# Patient Record
Sex: MALE | Race: WHITE | NOT HISPANIC OR LATINO | Employment: OTHER | URBAN - METROPOLITAN AREA
[De-identification: names, ages, dates, MRNs, and addresses within clinical notes are randomized per-mention and may not be internally consistent; named-entity substitution may affect disease eponyms.]

---

## 2017-01-09 DIAGNOSIS — I48.91 ATRIAL FIBRILLATION, UNSPECIFIED TYPE (HCC): ICD-10-CM

## 2017-01-09 DIAGNOSIS — I10 ESSENTIAL HYPERTENSION: ICD-10-CM

## 2017-01-09 RX ORDER — LOSARTAN POTASSIUM 50 MG/1
50 TABLET ORAL DAILY
Qty: 90 TAB | Refills: 3 | Status: SHIPPED | OUTPATIENT
Start: 2017-01-09 | End: 2018-02-26

## 2017-05-01 DIAGNOSIS — I48.20 CHRONIC ATRIAL FIBRILLATION (HCC): ICD-10-CM

## 2017-05-02 RX ORDER — DILTIAZEM HYDROCHLORIDE 360 MG/1
360 CAPSULE, EXTENDED RELEASE ORAL DAILY
Qty: 90 CAP | Refills: 2 | Status: SHIPPED | OUTPATIENT
Start: 2017-05-02 | End: 2017-05-05 | Stop reason: SDUPTHER

## 2017-05-05 DIAGNOSIS — I48.20 CHRONIC ATRIAL FIBRILLATION (HCC): ICD-10-CM

## 2017-05-05 RX ORDER — DILTIAZEM HYDROCHLORIDE 360 MG/1
360 CAPSULE, EXTENDED RELEASE ORAL DAILY
Qty: 90 CAP | Refills: 2 | Status: SHIPPED | OUTPATIENT
Start: 2017-05-05 | End: 2018-02-12 | Stop reason: SDUPTHER

## 2017-06-29 DIAGNOSIS — I10 ESSENTIAL HYPERTENSION: ICD-10-CM

## 2017-06-29 RX ORDER — SPIRONOLACTONE 25 MG/1
25 TABLET ORAL DAILY
Qty: 90 TAB | Refills: 3 | OUTPATIENT
Start: 2017-06-29 | End: 2018-10-12 | Stop reason: SDUPTHER

## 2018-02-12 DIAGNOSIS — I48.20 CHRONIC ATRIAL FIBRILLATION (HCC): ICD-10-CM

## 2018-02-13 RX ORDER — DILTIAZEM HYDROCHLORIDE 360 MG/1
CAPSULE, EXTENDED RELEASE ORAL
Qty: 60 CAP | Refills: 0 | Status: SHIPPED | OUTPATIENT
Start: 2018-02-13 | End: 2018-03-13 | Stop reason: SDUPTHER

## 2018-02-15 DIAGNOSIS — I48.20 CHRONIC ATRIAL FIBRILLATION (HCC): ICD-10-CM

## 2018-02-15 RX ORDER — DILTIAZEM HYDROCHLORIDE 360 MG/1
360 CAPSULE, EXTENDED RELEASE ORAL
Qty: 180 CAP | Refills: 0 | Status: CANCELLED | OUTPATIENT
Start: 2018-02-15

## 2018-02-15 NOTE — TELEPHONE ENCOUNTER
----- Message from Isaura Serrano sent at 2/15/2018 12:55 PM PST -----  Regarding: Patient wants call back about prescription  ROMIE/Nilesh    Patient was told by his pharmacy that he needs to speak to Dr Calderón about his prescription for Diltiazem. He wants a call back at [phone].    --------------------------------------------------------------------------    Pt is concerned that he wont get his diltiazem prescription due to a notification he got from [facility] that he needs to see if provider for more medication. Explained to the pt that a 60 day prescription was authorized with Dr. Calderón to [facility] but would need to be seen for further refill. Pt understands and will talk to scheduling after this call. He would like [facility] to verify.     Called [facility] and verified that prescription is on its way.

## 2018-02-23 ENCOUNTER — TELEPHONE (OUTPATIENT)
Dept: CARDIOLOGY | Facility: MEDICAL CENTER | Age: 68
End: 2018-02-23

## 2018-02-23 NOTE — TELEPHONE ENCOUNTER
calling to follow up STAT clearance request sent from Dr. Bao Zhong's office today.   Received: Today   Message Contents   Tj Shah R.N.   Phone Number: [phone]             ROMIE/Ansley     Pt calling to follow up on STAT urgent clearance request sent from Sonja at Dr. Bao Zhong's office today. Pt is scheduled for left knee surgery with Dr. Zhong Monday 2/26, at [facility]. He can be reached at [phone].      ____________________________________________________________      Stat request came in at 1430 and the office required it by 1500 per patient's message to .      Approved by the ADD (Dr. Lira). Patient to stop coumadin today for a total of 3 days of withholding.      Attempted to contact Sonja at Dr. Zhong's office, no answer.  Left message.     Form faxed to: [phone]    Attempted to contact patient, no answer.  Left message including stopping his coumadin starting tonight.      Completed form to scanning      __________________________________    Patient called back.  Patient actually stopped coumadin 2 days ago (for a total of 5 days)    Patient requesting I also send clearance to [facility]  Fax# [phone]  Hardy Sandoval

## 2018-02-26 ENCOUNTER — OFFICE VISIT (OUTPATIENT)
Dept: CARDIOLOGY | Facility: MEDICAL CENTER | Age: 68
End: 2018-02-26
Payer: MEDICARE

## 2018-02-26 VITALS
HEIGHT: 70 IN | OXYGEN SATURATION: 91 % | SYSTOLIC BLOOD PRESSURE: 134 MMHG | BODY MASS INDEX: 37.37 KG/M2 | DIASTOLIC BLOOD PRESSURE: 86 MMHG | HEART RATE: 68 BPM | WEIGHT: 261 LBS

## 2018-02-26 DIAGNOSIS — I48.19 PERSISTENT ATRIAL FIBRILLATION (HCC): ICD-10-CM

## 2018-02-26 DIAGNOSIS — I10 HTN (HYPERTENSION), MALIGNANT: ICD-10-CM

## 2018-02-26 DIAGNOSIS — Z79.899 HIGH RISK MEDICATION USE: ICD-10-CM

## 2018-02-26 PROCEDURE — 99215 OFFICE O/P EST HI 40 MIN: CPT | Performed by: INTERNAL MEDICINE

## 2018-02-26 RX ORDER — LOSARTAN POTASSIUM 100 MG/1
100 TABLET ORAL DAILY
Qty: 90 TAB | Refills: 3 | Status: SHIPPED | OUTPATIENT
Start: 2018-02-26 | End: 2019-07-16 | Stop reason: SDUPTHER

## 2018-02-26 ASSESSMENT — ENCOUNTER SYMPTOMS
VOMITING: 0
DEPRESSION: 0
NAUSEA: 0
PALPITATIONS: 0
WEIGHT LOSS: 0
MYALGIAS: 0
BLOOD IN STOOL: 0
BLURRED VISION: 0
HEADACHES: 0
COUGH: 0
CHILLS: 0
SHORTNESS OF BREATH: 0
DIZZINESS: 0
FALLS: 0
BRUISES/BLEEDS EASILY: 0
SPEECH CHANGE: 0
PND: 0
FEVER: 0
HALLUCINATIONS: 0
LOSS OF CONSCIOUSNESS: 0
ABDOMINAL PAIN: 0
CLAUDICATION: 0
SENSORY CHANGE: 0
DOUBLE VISION: 0
EYE PAIN: 0
EYE DISCHARGE: 0
ORTHOPNEA: 0

## 2018-02-26 NOTE — PROGRESS NOTES
Subjective:   Zander Stewart is a 68 y.o. male who presents today for cardiac care and evaluation due to uncontrolled hypertension. Patient was undergoing surgery for his knee. However, his blood pressure was noted to be very elevated at 190s systolic. His surgery was canceled. Patient also has a history of persistent atrial fibrillation for which he had 2 failed cardioversions in the past. He is on rate control strategy. He denies having any symptoms of chest pain shortness of breath at this point. He feels fine.    Chief Complaint: Malignant hypertension.    Past Medical History:   Diagnosis Date   • Atrial fibrillation (CMS-HCC)    • Atrial flutter (CMS-HCC)    • Chronic anticoagulation    • Polycystic kidney, unspecified type    • Pure hypercholesterolemia    • Sleep apnea     on oxygen, unable to tolerate CPAP.   • Unspecified essential hypertension    • Unspecified sleep apnea      Past Surgical History:   Procedure Laterality Date   • APPENDECTOMY     • OTHER      Cardiac Cath   • OTHER      Kidney Surgery   • OTHER      Nose Surgery   • OTHER      Tonsillectomy with Adenoidectomy     History reviewed. No pertinent family history.  History   Smoking Status   • Current Every Day Smoker   • Types: Cigars   Smokeless Tobacco   • Never Used     No Known Allergies  Outpatient Encounter Prescriptions as of 2/26/2018   Medication Sig Dispense Refill   • losartan (COZAAR) 100 MG Tab Take 1 Tab by mouth every day. 90 Tab 3   • diltiazem CD (CARDIZEM CD) 360 MG ER capsule TAKE 1 CAPSULE BY MOUTH  EVERY DAY 60 Cap 0   • spironolactone (ALDACTONE) 25 MG Tab Take 1 Tab by mouth every day. 90 Tab 3   • simvastatin (ZOCOR) 40 MG TABS Take 1 Tab by mouth every evening. 90 Tab 3   • furosemide (LASIX) 40 MG TABS Take 40 mg by mouth every day.     • omeprazole (PRILOSEC) 20 MG CPDR Take 20 mg by mouth 2 times a day.     • warfarin (COUMADIN) 5 MG TABS Take 5 mg by mouth every day. 5 mg 6 days a week and 2.5 mg one day    "  • buPROPion SR (WELLBUTRIN-SR) 150 MG TB12 Take 150 mg by mouth every 48 hours.     • allopurinol (ZYLOPRIM) 100 MG TABS Take 100 mg by mouth every day.     • zolpidem (AMBIEN) 10 MG TABS Take 10 mg by mouth at bedtime as needed.     • [DISCONTINUED] losartan (COZAAR) 50 MG Tab Take 1 Tab by mouth every day. 90 Tab 3     No facility-administered encounter medications on file as of 2/26/2018.      Review of Systems   Constitutional: Negative for chills, fever, malaise/fatigue and weight loss.   HENT: Negative for ear discharge, ear pain, hearing loss and nosebleeds.    Eyes: Negative for blurred vision, double vision, pain and discharge.   Respiratory: Negative for cough and shortness of breath.    Cardiovascular: Negative for chest pain, palpitations, orthopnea, claudication, leg swelling and PND.   Gastrointestinal: Negative for abdominal pain, blood in stool, melena, nausea and vomiting.   Genitourinary: Negative for dysuria and hematuria.   Musculoskeletal: Negative for falls, joint pain and myalgias.   Skin: Negative for itching and rash.   Neurological: Negative for dizziness, sensory change, speech change, loss of consciousness and headaches.   Endo/Heme/Allergies: Negative for environmental allergies. Does not bruise/bleed easily.   Psychiatric/Behavioral: Negative for depression, hallucinations and suicidal ideas.        Objective:   /86   Pulse 68   Ht 1.778 m (5' 10\")   Wt 118.4 kg (261 lb)   SpO2 91%   BMI 37.45 kg/m²     Physical Exam   Constitutional: He is oriented to person, place, and time. No distress.   HENT:   Head: Normocephalic and atraumatic.   Eyes: EOM are normal.   Neck: Normal range of motion. No JVD present.   Cardiovascular: Normal rate, regular rhythm, normal heart sounds and intact distal pulses.  Exam reveals no gallop and no friction rub.    No murmur heard.  Bilateral femoral pulses are 2+, bilateral dorsalis pedis pulses are 2+, bilateral posterior tibialis pulses are " 2+.   Pulmonary/Chest: No respiratory distress. He has no wheezes. He has no rales. He exhibits no tenderness.   Abdominal: Soft. Bowel sounds are normal. There is no tenderness. There is no rebound and no guarding.   The is no presence of abdominal bruits   Musculoskeletal: Normal range of motion.   Neurological: He is alert and oriented to person, place, and time.   Skin: Skin is warm and dry.   Psychiatric: He has a normal mood and affect.   Nursing note and vitals reviewed.      Assessment:     1. High risk medication use  BASIC METABOLIC PANEL   2. Persistent atrial fibrillation (CMS-HCC)  BASIC METABOLIC PANEL   3. HTN (hypertension), malignant  losartan (COZAAR) 100 MG Tab    BASIC METABOLIC PANEL       Medical Decision Making:  Today's Assessment / Status / Plan:   His blood pressure is better today but still elevated.  I will increase losartan to 100 mg once a day.  Continue Cardizem  mg by mouth once a day.  Continue anticoagulation for recurrent persistent atrial fibrillation.  Rate is controlled.    Patient will resume to see Dr. Perkins in 2 weeks for follow-up of his blood pressure response.

## 2018-02-26 NOTE — TELEPHONE ENCOUNTER
[facility] calling stating that Dr. Montez will not be proceeding with knee scope today as patients blood pressure was 200/135 on arrival. The patient was given 1 dose of labetalol and now has a BP of 175/120. Per Dr. Montez, patient either needs to be seen by cardiology today or will need to go to the ER.     Pt has not been seen at our office since 11/03/2016 with Dr. Calderón. He does have a history of labile hypertension. Pt scheduled with Dr. Curry today at 1330. Pt also has a f/u scheduled with Dr. Calderón 3/19.

## 2018-02-26 NOTE — LETTER
[facility]   [address]  [place] [address]  Phone: [phone]  Fax: [phone]              Zander Stewart  1950    Encounter Date: 2/26/2018    Angelica Curry M.D.          PROGRESS NOTE:  Subjective:   Zander Stewart is a 68 y.o. male who presents today for cardiac care and evaluation due to uncontrolled hypertension. Patient was undergoing surgery for his knee. However, his blood pressure was noted to be very elevated at 190s systolic. His surgery was canceled. Patient also has a history of persistent atrial fibrillation for which he had 2 failed cardioversions in the past. He is on rate control strategy. He denies having any symptoms of chest pain shortness of breath at this point. He feels fine.    Chief Complaint: Malignant hypertension.    Past Medical History:   Diagnosis Date   • Atrial fibrillation (CMS-HCC)    • Atrial flutter (CMS-HCC)    • Chronic anticoagulation    • Polycystic kidney, unspecified type    • Pure hypercholesterolemia    • Sleep apnea     on oxygen, unable to tolerate CPAP.   • Unspecified essential hypertension    • Unspecified sleep apnea      Past Surgical History:   Procedure Laterality Date   • APPENDECTOMY     • OTHER      Cardiac Cath   • OTHER      Kidney Surgery   • OTHER      Nose Surgery   • OTHER      Tonsillectomy with Adenoidectomy     History reviewed. No pertinent family history.  History   Smoking Status   • Current Every Day Smoker   • Types: Cigars   Smokeless Tobacco   • Never Used     No Known Allergies  Outpatient Encounter Prescriptions as of 2/26/2018   Medication Sig Dispense Refill   • losartan (COZAAR) 100 MG Tab Take 1 Tab by mouth every day. 90 Tab 3   • diltiazem CD (CARDIZEM CD) 360 MG ER capsule TAKE 1 CAPSULE BY MOUTH  EVERY DAY 60 Cap 0   • spironolactone (ALDACTONE) 25 MG Tab Take 1 Tab by mouth every day. 90 Tab 3   • simvastatin (ZOCOR) 40 MG TABS Take 1 Tab by  "mouth every evening. 90 Tab 3   • furosemide (LASIX) 40 MG TABS Take 40 mg by mouth every day.     • omeprazole (PRILOSEC) 20 MG CPDR Take 20 mg by mouth 2 times a day.     • warfarin (COUMADIN) 5 MG TABS Take 5 mg by mouth every day. 5 mg 6 days a week and 2.5 mg one day     • buPROPion SR (WELLBUTRIN-SR) 150 MG TB12 Take 150 mg by mouth every 48 hours.     • allopurinol (ZYLOPRIM) 100 MG TABS Take 100 mg by mouth every day.     • zolpidem (AMBIEN) 10 MG TABS Take 10 mg by mouth at bedtime as needed.     • [DISCONTINUED] losartan (COZAAR) 50 MG Tab Take 1 Tab by mouth every day. 90 Tab 3     No facility-administered encounter medications on file as of 2/26/2018.      Review of Systems   Constitutional: Negative for chills, fever, malaise/fatigue and weight loss.   HENT: Negative for ear discharge, ear pain, hearing loss and nosebleeds.    Eyes: Negative for blurred vision, double vision, pain and discharge.   Respiratory: Negative for cough and shortness of breath.    Cardiovascular: Negative for chest pain, palpitations, orthopnea, claudication, leg swelling and PND.   Gastrointestinal: Negative for abdominal pain, blood in stool, melena, nausea and vomiting.   Genitourinary: Negative for dysuria and hematuria.   Musculoskeletal: Negative for falls, joint pain and myalgias.   Skin: Negative for itching and rash.   Neurological: Negative for dizziness, sensory change, speech change, loss of consciousness and headaches.   Endo/Heme/Allergies: Negative for environmental allergies. Does not bruise/bleed easily.   Psychiatric/Behavioral: Negative for depression, hallucinations and suicidal ideas.        Objective:   /86   Pulse 68   Ht 1.778 m (5' 10\")   Wt 118.4 kg (261 lb)   SpO2 91%   BMI 37.45 kg/m²      Physical Exam   Constitutional: He is oriented to person, place, and time. No distress.   HENT:   Head: Normocephalic and atraumatic.   Eyes: EOM are normal.   Neck: Normal range of motion. No JVD " present.   Cardiovascular: Normal rate, regular rhythm, normal heart sounds and intact distal pulses.  Exam reveals no gallop and no friction rub.    No murmur heard.  Bilateral femoral pulses are 2+, bilateral dorsalis pedis pulses are 2+, bilateral posterior tibialis pulses are 2+.   Pulmonary/Chest: No respiratory distress. He has no wheezes. He has no rales. He exhibits no tenderness.   Abdominal: Soft. Bowel sounds are normal. There is no tenderness. There is no rebound and no guarding.   The is no presence of abdominal bruits   Musculoskeletal: Normal range of motion.   Neurological: He is alert and oriented to person, place, and time.   Skin: Skin is warm and dry.   Psychiatric: He has a normal mood and affect.   Nursing note and vitals reviewed.      Assessment:     1. High risk medication use  BASIC METABOLIC PANEL   2. Persistent atrial fibrillation (CMS-HCC)  BASIC METABOLIC PANEL   3. HTN (hypertension), malignant  losartan (COZAAR) 100 MG Tab    BASIC METABOLIC PANEL       Medical Decision Making:  Today's Assessment / Status / Plan:   His blood pressure is better today but still elevated.  I will increase losartan to 100 mg once a day.  Continue Cardizem  mg by mouth once a day.  Continue anticoagulation for recurrent persistent atrial fibrillation.  Rate is controlled.    Patient will resume to see Dr. Perkins in 2 weeks for follow-up of his blood pressure response.      Sandi Pena D.O.  [address]  [facility] [address]  VIA Facsimile: [phone]

## 2018-03-13 DIAGNOSIS — I48.20 CHRONIC ATRIAL FIBRILLATION (HCC): ICD-10-CM

## 2018-03-15 RX ORDER — DILTIAZEM HYDROCHLORIDE 360 MG/1
CAPSULE, EXTENDED RELEASE ORAL
Qty: 90 CAP | Refills: 3 | Status: SHIPPED | OUTPATIENT
Start: 2018-03-15 | End: 2018-03-19

## 2018-03-16 LAB
BUN SERPL-MCNC: 27 MG/DL (ref 8–27)
BUN/CREAT SERPL: 18 (ref 10–24)
CALCIUM SERPL-MCNC: 9.5 MG/DL (ref 8.6–10.2)
CHLORIDE SERPL-SCNC: 104 MMOL/L (ref 96–106)
CO2 SERPL-SCNC: 21 MMOL/L (ref 18–29)
CREAT SERPL-MCNC: 1.52 MG/DL (ref 0.76–1.27)
GFR SERPLBLD CREATININE-BSD FMLA CKD-EPI: 46 ML/MIN/1.73
GFR SERPLBLD CREATININE-BSD FMLA CKD-EPI: 54 ML/MIN/1.73
GLUCOSE SERPL-MCNC: 87 MG/DL (ref 65–99)
POTASSIUM SERPL-SCNC: 3.9 MMOL/L (ref 3.5–5.2)
SODIUM SERPL-SCNC: 143 MMOL/L (ref 134–144)

## 2018-03-19 ENCOUNTER — OFFICE VISIT (OUTPATIENT)
Dept: CARDIOLOGY | Facility: MEDICAL CENTER | Age: 68
End: 2018-03-19
Payer: MEDICARE

## 2018-03-19 VITALS
SYSTOLIC BLOOD PRESSURE: 164 MMHG | HEIGHT: 70 IN | OXYGEN SATURATION: 99 % | BODY MASS INDEX: 37.22 KG/M2 | RESPIRATION RATE: 14 BRPM | WEIGHT: 260 LBS | HEART RATE: 60 BPM | DIASTOLIC BLOOD PRESSURE: 100 MMHG

## 2018-03-19 DIAGNOSIS — Z79.01 CHRONIC ANTICOAGULATION: Chronic | ICD-10-CM

## 2018-03-19 DIAGNOSIS — I10 ESSENTIAL HYPERTENSION: Chronic | ICD-10-CM

## 2018-03-19 DIAGNOSIS — N18.9 CHRONIC KIDNEY DISEASE, UNSPECIFIED CKD STAGE: ICD-10-CM

## 2018-03-19 DIAGNOSIS — I48.20 CHRONIC ATRIAL FIBRILLATION (HCC): Chronic | ICD-10-CM

## 2018-03-19 PROCEDURE — 99214 OFFICE O/P EST MOD 30 MIN: CPT | Performed by: INTERNAL MEDICINE

## 2018-03-19 RX ORDER — ESCITALOPRAM OXALATE 20 MG/1
10 TABLET ORAL DAILY
COMMUNITY
Start: 2018-02-20 | End: 2021-03-11

## 2018-03-19 RX ORDER — DILTIAZEM HYDROCHLORIDE 120 MG/1
120 CAPSULE, COATED, EXTENDED RELEASE ORAL DAILY
Qty: 90 CAP | Refills: 3 | Status: SHIPPED | OUTPATIENT
Start: 2018-03-19 | End: 2019-06-05 | Stop reason: SDUPTHER

## 2018-03-19 RX ORDER — AMOXICILLIN 875 MG/1
TABLET, COATED ORAL
COMMUNITY
Start: 2018-01-19 | End: 2019-08-13

## 2018-03-19 RX ORDER — ALBUTEROL SULFATE 90 UG/1
1-2 AEROSOL, METERED RESPIRATORY (INHALATION) PRN
COMMUNITY
Start: 2018-01-19 | End: 2021-11-10

## 2018-03-19 NOTE — NON-PROVIDER
Blood pressure check in office with patient's automatic wrist cuff.  Manual readings in office by M.A.  Left Arm BP Cuff readin/98, Pulse 60  Right Arm BP Cuff Readin/100, Pulse 60    Automatic Wrist Cuff (Patient brought in)  Left Arm Wrist Cuff BP readin/110, Pulse 71  Right Arm Wrist Cuff BP readin/106, Pulse 77

## 2018-03-19 NOTE — LETTER
[facility]   [address] [place] [address]  [place] [address]  Phone: [phone]  Fax: [phone]              Zander Stewart  1950    Encounter Date: 3/19/2018    Eugene Calderón M.D.          PROGRESS NOTE:  Subjective:   Zander Stewart is a 66 y.o. male who presents today for follow-up evaluation of chronic atrial fibrillation, chronic anticoagulation, hypertension and hyperlipidemia in addition he has a history of obstructive sleep apnea with intolerance to CPAP therapy.    I last saw her recently on 2/26/2018 by Dr. Scot Curry for uncontrolled hypertension resulting in cancellation of left knee arthroscopy.  Dr. Curry increased losartan from 50 mg daily to 100 mg daily.  The patient intermittently checks his blood pressure and notices it to be higher in the morning.  Recent BMP shows worsening renal function.  Complains of lower extremity edema which he attributes to Cardizem after researching it on the Internet.    Previously followed by Dr. Drake Westbrook, electrophysiologist and Dr. Scot Hernández, retired cardiologist    Last seen on 6/5/2015.  Chief Complaint   Patient presents with   • Blood Pressure Problem         Past Medical History:   Diagnosis Date   • Atrial fibrillation (CMS-HCC)    • Atrial flutter (CMS-HCC)    • Chronic anticoagulation    • Polycystic kidney, unspecified type    • Pure hypercholesterolemia    • Sleep apnea     on oxygen, unable to tolerate CPAP.   • Unspecified essential hypertension    • Unspecified sleep apnea      Past Surgical History:   Procedure Laterality Date   • APPENDECTOMY     • OTHER      Cardiac Cath   • OTHER      Kidney Surgery   • OTHER      Nose Surgery   • OTHER      Tonsillectomy with Adenoidectomy     History reviewed. No pertinent family history.  History   Smoking Status   • Current Every Day Smoker   • Types: Cigars   Smokeless Tobacco   • Never Used     No Known Allergies  Outpatient Encounter  "Prescriptions as of 3/19/2018   Medication Sig Dispense Refill   • VENTOLIN  (90 Base) MCG/ACT Aero Soln inhalation aerosol Inhale 1-2 Puffs by mouth as needed.     • escitalopram (LEXAPRO) 20 MG tablet Take 20 mg by mouth every day.     • DILTIAZem CD (CARDIZEM CD) 120 MG CAPSULE SR 24 HR Take 1 Cap by mouth every day. 90 Cap 3   • losartan (COZAAR) 100 MG Tab Take 1 Tab by mouth every day. 90 Tab 3   • spironolactone (ALDACTONE) 25 MG Tab Take 1 Tab by mouth every day. 90 Tab 3   • simvastatin (ZOCOR) 40 MG TABS Take 1 Tab by mouth every evening. 90 Tab 3   • furosemide (LASIX) 40 MG TABS Take 40 mg by mouth every day.     • omeprazole (PRILOSEC) 20 MG CPDR Take 20 mg by mouth 2 times a day.     • warfarin (COUMADIN) 5 MG TABS Take 5 mg by mouth every day. 5 mg 6 days a week and 2.5 mg one day     • buPROPion SR (WELLBUTRIN-SR) 150 MG TB12 Take 150 mg by mouth every 48 hours.     • allopurinol (ZYLOPRIM) 100 MG TABS Take 100 mg by mouth every day.     • zolpidem (AMBIEN) 10 MG TABS Take 10 mg by mouth at bedtime as needed.     • amoxicillin (AMOXIL) 875 MG tablet      • [DISCONTINUED] diltiazem CD (CARDIZEM CD) 360 MG ER capsule TAKE 1 CAPSULE BY MOUTH  EVERY DAY 90 Cap 3     No facility-administered encounter medications on file as of 3/19/2018.      ROS     Objective:   BP (!) 164/100   Pulse 60   Resp 14   Ht 1.778 m (5' 10\")   Wt 117.9 kg (260 lb)   SpO2 99%   BMI 37.31 kg/m²      Physical Exam   Constitutional: He is oriented to person, place, and time.   HENT:   Head: Normocephalic.   Neck: No JVD present.   Cardiovascular: Normal rate.  An irregularly irregular rhythm present.   Pulses:       Carotid pulses are 1+ on the right side, and 1+ on the left side.       Radial pulses are 1+ on the right side, and 1+ on the left side.   Pulmonary/Chest: Breath sounds normal. He has no wheezes. He has no rales.   Abdominal:   Obese.   Musculoskeletal: He exhibits edema.   Neurological: He is alert and " oriented to person, place, and time.     ECHOCARDIOGRAM 06/06/2013  Left ejection fraction 55-60%.  Mild concentric left ventricular hypertrophy.  Mild mitral regurgitation.  Severe left atrial dilatation.  Moderate right atrial dilatation.    EKG 04/25/2013 atrial fibrillation, rate 83.    Assessment:     1. Chronic atrial fibrillation (CMS-HCC)     2. Chronic anticoagulation     3. Essential hypertension     4. Chronic kidney disease, unspecified CKD stage       Medical Decision Making:  Today's Assessment / Status / Plan:     Atrial fibrillation. Chronic. Rate controlled. Asymptomatic.    Hypertension. Uncontrolled.    Anticoagulation. On warfarin. Monitored anticoagulation clinic.    Hyperlipidemia. On simvastatin.    Chronic kidney disease with worsening renal function.    Lower extremity edema. Function of Cardizem and possible worsening renal failure with salt and water retention.    Recommendation and recommendation  1. Had a detailed discussion with the patient with regards to his hypertension situation.  2. In view of his worsening renal function I would recommend that his nephrologist address his hypertensive regimen in conjunction with his renal function which I think would be expeditious in the management of his hypertension.  3. For his edema I will decrease his Cardizem  mg down to 120 mg.  4. He was instructed to keep a daily blood pressure and heart rate diary.  5. Follow-up 2 months.      Sandi Pena D.O.  [address]  [place] [address]  VIA Facsimile: [phone]     Kat Vinson M.D.  [address] [place] [address]  VIA Facsimile: [phone]

## 2018-03-19 NOTE — PROGRESS NOTES
Subjective:   Zander Stewart is a 66 y.o. male who presents today for follow-up evaluation of chronic atrial fibrillation, chronic anticoagulation, hypertension and hyperlipidemia in addition he has a history of obstructive sleep apnea with intolerance to CPAP therapy.    I last saw her recently on 2/26/2018 by Dr. Scot Curry for uncontrolled hypertension resulting in cancellation of left knee arthroscopy.  Dr. Crury increased losartan from 50 mg daily to 100 mg daily.  The patient intermittently checks his blood pressure and notices it to be higher in the morning.  Recent BMP shows worsening renal function.  Complains of lower extremity edema which he attributes to Cardizem after researching it on the Internet.    Previously followed by Dr. Drake Westbrook, electrophysiologist and Dr. Scot Hernández, retired cardiologist    Last seen on 6/5/2015.  Chief Complaint   Patient presents with   • Blood Pressure Problem         Past Medical History:   Diagnosis Date   • Atrial fibrillation (CMS-HCC)    • Atrial flutter (CMS-HCC)    • Chronic anticoagulation    • Polycystic kidney, unspecified type    • Pure hypercholesterolemia    • Sleep apnea     on oxygen, unable to tolerate CPAP.   • Unspecified essential hypertension    • Unspecified sleep apnea      Past Surgical History:   Procedure Laterality Date   • APPENDECTOMY     • OTHER      Cardiac Cath   • OTHER      Kidney Surgery   • OTHER      Nose Surgery   • OTHER      Tonsillectomy with Adenoidectomy     History reviewed. No pertinent family history.  History   Smoking Status   • Current Every Day Smoker   • Types: Cigars   Smokeless Tobacco   • Never Used     No Known Allergies  Outpatient Encounter Prescriptions as of 3/19/2018   Medication Sig Dispense Refill   • VENTOLIN  (90 Base) MCG/ACT Aero Soln inhalation aerosol Inhale 1-2 Puffs by mouth as needed.     • escitalopram (LEXAPRO) 20 MG tablet Take 20 mg by mouth every day.     • DILTIAZem CD (CARDIZEM CD)  "120 MG CAPSULE SR 24 HR Take 1 Cap by mouth every day. 90 Cap 3   • losartan (COZAAR) 100 MG Tab Take 1 Tab by mouth every day. 90 Tab 3   • spironolactone (ALDACTONE) 25 MG Tab Take 1 Tab by mouth every day. 90 Tab 3   • simvastatin (ZOCOR) 40 MG TABS Take 1 Tab by mouth every evening. 90 Tab 3   • furosemide (LASIX) 40 MG TABS Take 40 mg by mouth every day.     • omeprazole (PRILOSEC) 20 MG CPDR Take 20 mg by mouth 2 times a day.     • warfarin (COUMADIN) 5 MG TABS Take 5 mg by mouth every day. 5 mg 6 days a week and 2.5 mg one day     • buPROPion SR (WELLBUTRIN-SR) 150 MG TB12 Take 150 mg by mouth every 48 hours.     • allopurinol (ZYLOPRIM) 100 MG TABS Take 100 mg by mouth every day.     • zolpidem (AMBIEN) 10 MG TABS Take 10 mg by mouth at bedtime as needed.     • amoxicillin (AMOXIL) 875 MG tablet      • [DISCONTINUED] diltiazem CD (CARDIZEM CD) 360 MG ER capsule TAKE 1 CAPSULE BY MOUTH  EVERY DAY 90 Cap 3     No facility-administered encounter medications on file as of 3/19/2018.      ROS     Objective:   BP (!) 164/100   Pulse 60   Resp 14   Ht 1.778 m (5' 10\")   Wt 117.9 kg (260 lb)   SpO2 99%   BMI 37.31 kg/m²     Physical Exam   Constitutional: He is oriented to person, place, and time.   HENT:   Head: Normocephalic.   Neck: No JVD present.   Cardiovascular: Normal rate.  An irregularly irregular rhythm present.   Pulses:       Carotid pulses are 1+ on the right side, and 1+ on the left side.       Radial pulses are 1+ on the right side, and 1+ on the left side.   Pulmonary/Chest: Breath sounds normal. He has no wheezes. He has no rales.   Abdominal:   Obese.   Musculoskeletal: He exhibits edema.   Neurological: He is alert and oriented to person, place, and time.     ECHOCARDIOGRAM 06/06/2013  Left ejection fraction 55-60%.  Mild concentric left ventricular hypertrophy.  Mild mitral regurgitation.  Severe left atrial dilatation.  Moderate right atrial dilatation.    EKG 04/25/2013 atrial " fibrillation, rate 83.    Assessment:     1. Chronic atrial fibrillation (CMS-HCC)     2. Chronic anticoagulation     3. Essential hypertension     4. Chronic kidney disease, unspecified CKD stage       Medical Decision Making:  Today's Assessment / Status / Plan:     Atrial fibrillation. Chronic. Rate controlled. Asymptomatic.    Hypertension. Uncontrolled.    Anticoagulation. On warfarin. Monitored anticoagulation clinic.    Hyperlipidemia. On simvastatin.    Chronic kidney disease with worsening renal function.    Lower extremity edema. Function of Cardizem and possible worsening renal failure with salt and water retention.    Recommendation and recommendation  1. Had a detailed discussion with the patient with regards to his hypertension situation.  2. In view of his worsening renal function I would recommend that his nephrologist address his hypertensive regimen in conjunction with his renal function which I think would be expeditious in the management of his hypertension.  3. For his edema I will decrease his Cardizem  mg down to 120 mg.  4. He was instructed to keep a daily blood pressure and heart rate diary.  5. Follow-up 2 months.

## 2018-05-01 ENCOUNTER — OFFICE VISIT (OUTPATIENT)
Dept: CARDIOLOGY | Facility: MEDICAL CENTER | Age: 68
End: 2018-05-01
Payer: MEDICARE

## 2018-05-01 VITALS
BODY MASS INDEX: 36.22 KG/M2 | OXYGEN SATURATION: 95 % | HEART RATE: 82 BPM | DIASTOLIC BLOOD PRESSURE: 98 MMHG | SYSTOLIC BLOOD PRESSURE: 140 MMHG | RESPIRATION RATE: 16 BRPM | HEIGHT: 70 IN | WEIGHT: 253 LBS

## 2018-05-01 DIAGNOSIS — I10 ESSENTIAL HYPERTENSION: Chronic | ICD-10-CM

## 2018-05-01 DIAGNOSIS — I48.20 ATRIAL FIBRILLATION, CHRONIC (HCC): ICD-10-CM

## 2018-05-01 DIAGNOSIS — Z79.01 CHRONIC ANTICOAGULATION: Chronic | ICD-10-CM

## 2018-05-01 PROCEDURE — 99214 OFFICE O/P EST MOD 30 MIN: CPT | Performed by: INTERNAL MEDICINE

## 2018-05-01 ASSESSMENT — ENCOUNTER SYMPTOMS
PALPITATIONS: 0
DIZZINESS: 0
WHEEZING: 0
MYALGIAS: 0
COUGH: 0
LOSS OF CONSCIOUSNESS: 0

## 2018-05-01 NOTE — PROGRESS NOTES
Chief Complaint   Patient presents with   • Atrial fibrillation        Subjective:   Zander Stewart is a 68 y.o. male who presents today for follow-up evaluation of chronic atrial fibrillation, chronic anticoagulation, hypertension and hyperlipidemia in addition he has a history of obstructive sleep apnea with intolerance to CPAP therapy.    Last seen on 3/19/2018.    Since 3/19/2018 the patient's lower extremity edema has resolved after reduction of Cardizem CD from 360 mg to 120 mg.  The patient has closely monitor his blood pressure fluctuates on average in the 140s to 150 range.  No other cardiac symptoms.     The patient was recently on 2/26/2018 by Dr. Scot Curry for uncontrolled hypertension resulting in cancellation of left knee arthroscopy.  Dr. Curry increased losartan from 50 mg daily to 100 mg daily.  The patient intermittently checks his blood pressure and notices it to be higher in the morning.  Recent BMP shows worsening renal function.  Complains of lower extremity edema which he attributes to Cardizem after researching it on the Internet.     Previously followed by Dr. Drake Westbrook, electrophysiologist and Dr. Scot Hernández, retired cardiologist    Past Medical History:   Diagnosis Date   • Atrial fibrillation (HCC)    • Atrial flutter (HCC)    • Chronic anticoagulation    • Polycystic kidney, unspecified type    • Pure hypercholesterolemia    • Sleep apnea     on oxygen, unable to tolerate CPAP.   • Unspecified essential hypertension    • Unspecified sleep apnea      Past Surgical History:   Procedure Laterality Date   • APPENDECTOMY     • OTHER      Cardiac Cath   • OTHER      Kidney Surgery   • OTHER      Nose Surgery   • OTHER      Tonsillectomy with Adenoidectomy     History reviewed. No pertinent family history.  Social History     Social History   • Marital status:      Spouse name: N/A   • Number of children: N/A   • Years of education: N/A     Occupational History   • Not on file.  "    Social History Main Topics   • Smoking status: Current Every Day Smoker     Types: Cigars   • Smokeless tobacco: Never Used   • Alcohol use Yes      Comment: moderate   • Drug use: No   • Sexual activity: Not on file     Other Topics Concern   • Not on file     Social History Narrative   • No narrative on file     No Known Allergies  Outpatient Encounter Prescriptions as of 5/1/2018   Medication Sig Dispense Refill   • VENTOLIN  (90 Base) MCG/ACT Aero Soln inhalation aerosol Inhale 1-2 Puffs by mouth as needed.     • escitalopram (LEXAPRO) 20 MG tablet Take 20 mg by mouth every day.     • DILTIAZem CD (CARDIZEM CD) 120 MG CAPSULE SR 24 HR Take 1 Cap by mouth every day. 90 Cap 3   • losartan (COZAAR) 100 MG Tab Take 1 Tab by mouth every day. (Patient taking differently: Take 50 mg by mouth 2 Times a Day.) 90 Tab 3   • spironolactone (ALDACTONE) 25 MG Tab Take 1 Tab by mouth every day. 90 Tab 3   • simvastatin (ZOCOR) 40 MG TABS Take 1 Tab by mouth every evening. 90 Tab 3   • furosemide (LASIX) 40 MG TABS Take 40 mg by mouth every day.     • omeprazole (PRILOSEC) 20 MG CPDR Take 20 mg by mouth 2 times a day.     • warfarin (COUMADIN) 5 MG TABS Take 5 mg by mouth every day. 5 mg 6 days a week and 2.5 mg one day     • buPROPion SR (WELLBUTRIN-SR) 150 MG TB12 Take 150 mg by mouth every 48 hours.     • allopurinol (ZYLOPRIM) 100 MG TABS Take 100 mg by mouth every day.     • zolpidem (AMBIEN) 10 MG TABS Take 10 mg by mouth at bedtime as needed.     • amoxicillin (AMOXIL) 875 MG tablet        No facility-administered encounter medications on file as of 5/1/2018.      Review of Systems   Respiratory: Negative for cough and wheezing.    Cardiovascular: Negative for chest pain and palpitations.   Musculoskeletal: Negative for myalgias.   Neurological: Negative for dizziness and loss of consciousness.        Objective:   /98   Pulse 82   Resp 16   Ht 1.778 m (5' 10\")   Wt 114.8 kg (253 lb)   SpO2 95%   " BMI 36.30 kg/m²     Physical Exam   Constitutional: He is oriented to person, place, and time. He appears well-developed and well-nourished. No distress.   Neck: No JVD present.   Cardiovascular: Normal rate, normal heart sounds and intact distal pulses.  An irregularly irregular rhythm present. Exam reveals no gallop and no friction rub.    No murmur heard.  Pulmonary/Chest: Effort normal and breath sounds normal. No respiratory distress. He has no wheezes. He has no rales.   Abdominal:   Prominently protuberant.   Musculoskeletal: He exhibits no edema.   Neurological: He is alert and oriented to person, place, and time.   Skin: Skin is warm and dry.   Psychiatric: He has a normal mood and affect. His behavior is normal.     ECHOCARDIOGRAM 06/06/2013  Left ejection fraction 55-60%.  Mild concentric left ventricular hypertrophy.  Mild mitral regurgitation.  Severe left atrial dilatation.  Moderate right atrial dilatation.     EKG 04/25/2013 atrial fibrillation, rate 83.    Assessment:     1. Atrial fibrillation, chronic (HCC)     2. Chronic anticoagulation     3. Essential hypertension         Medical Decision Making:  Today's Assessment / Status / Plan:   Atrial fibrillation. Chronic. Rate controlled. Asymptomatic.     Hypertension. Still not adequately controlled.     Anticoagulation. On warfarin. Monitored in anticoagulation clinic.     Hyperlipidemia. On simvastatin.     Chronic kidney disease with worsening renal function.     Lower extremity edema. Improved with reduction of Cardizem dose.    Recommendation and recommendation  1. Increase losartan to 100 mg every morning and 50 mg every afternoon.  2. Continue to monitor blood pressure.  3. He will make a follow-up appointment with see her in nephrology.

## 2018-05-01 NOTE — NON-PROVIDER
Blood pressure check at office visit with Dr. Calderón. The patient brought in their automatic wrist cuff for calibration.     Manual readings at initial visit:  Left upper arm /98, Pulse 82.  Right upper arm /100, Pulse 82.    Automatic radial wrist cuff:  Left wrist /105, Pulse 81.  Right wrist /102, Pulse 77.    Recheck manual blood pressure before leaving room:  Left upper arm /98.

## 2018-09-27 ENCOUNTER — OFFICE VISIT (OUTPATIENT)
Dept: CARDIOLOGY | Facility: MEDICAL CENTER | Age: 68
End: 2018-09-27
Payer: MEDICARE

## 2018-09-27 VITALS
OXYGEN SATURATION: 96 % | DIASTOLIC BLOOD PRESSURE: 84 MMHG | RESPIRATION RATE: 14 BRPM | BODY MASS INDEX: 35.07 KG/M2 | HEART RATE: 84 BPM | SYSTOLIC BLOOD PRESSURE: 138 MMHG | WEIGHT: 245 LBS | HEIGHT: 70 IN

## 2018-09-27 DIAGNOSIS — I10 ESSENTIAL HYPERTENSION: Chronic | ICD-10-CM

## 2018-09-27 DIAGNOSIS — Z79.01 CHRONIC ANTICOAGULATION: Chronic | ICD-10-CM

## 2018-09-27 DIAGNOSIS — I48.20 ATRIAL FIBRILLATION, CHRONIC (HCC): ICD-10-CM

## 2018-09-27 PROCEDURE — 99214 OFFICE O/P EST MOD 30 MIN: CPT | Performed by: INTERNAL MEDICINE

## 2018-09-27 ASSESSMENT — ENCOUNTER SYMPTOMS
PALPITATIONS: 0
MYALGIAS: 0
SHORTNESS OF BREATH: 0
DIZZINESS: 0
COUGH: 0
LOSS OF CONSCIOUSNESS: 0

## 2018-09-27 NOTE — PROGRESS NOTES
Chief Complaint   Patient presents with   • Dizziness     With the increased Losartan C/O dizziness and vertigo.       Subjective:   Zander Stewart is a 68 y.o. male who presents today for follow-up evaluation of chronic atrial fibrillation, chronic anticoagulation, hypertension and hyperlipidemia in addition he has a history of obstructive sleep apnea with intolerance to CPAP therapy.     Last seen on 5/1/2018.    Since 5/1/2018 appointment the patient developed dizziness and balance problems with vertigo with increasing dose of losartan which he reduced back to his previous dose with resolution of his dizziness and balance problems.   No bleeding problems.  No other cardiac symptoms.  Remains active playing golf.  Just returned from a trip visiting his daughter in [place], [place].    Since 3/19/2018 the patient's lower extremity edema has resolved after reduction of Cardizem CD from 360 mg to 120 mg.  The patient has closely monitor his blood pressure fluctuates on average in the 140s to 150 range.  No other cardiac symptoms.     The patient was recently on 2/26/2018 by Dr. Scot Curry for uncontrolled hypertension resulting in cancellation of left knee arthroscopy.  Dr. Curry increased losartan from 50 mg daily to 100 mg daily.  The patient intermittently checks his blood pressure and notices it to be higher in the morning.  Recent BMP shows worsening renal function.  Complains of lower extremity edema which he attributes to Cardizem after researching it on the Internet.     Previously followed by Dr. Drake Westbrook, electrophysiologist and Dr. Scot Hernández, retired cardiologist     Past Medical History:   Diagnosis Date   • Atrial fibrillation (HCC)    • Atrial flutter (HCC)    • Chronic anticoagulation    • Polycystic kidney, unspecified type    • Pure hypercholesterolemia    • Sleep apnea     on oxygen, unable to tolerate CPAP.   • Unspecified essential hypertension    • Unspecified sleep apnea      Past  Surgical History:   Procedure Laterality Date   • APPENDECTOMY     • OTHER      Cardiac Cath   • OTHER      Kidney Surgery   • OTHER      Nose Surgery   • OTHER      Tonsillectomy with Adenoidectomy     History reviewed. No pertinent family history.  Social History     Social History   • Marital status:      Spouse name: N/A   • Number of children: N/A   • Years of education: N/A     Occupational History   • Not on file.     Social History Main Topics   • Smoking status: Current Every Day Smoker     Types: Cigars   • Smokeless tobacco: Never Used   • Alcohol use Yes      Comment: moderate   • Drug use: No   • Sexual activity: Not on file     Other Topics Concern   • Not on file     Social History Narrative   • No narrative on file     No Known Allergies  Outpatient Encounter Prescriptions as of 9/27/2018   Medication Sig Dispense Refill   • VENTOLIN  (90 Base) MCG/ACT Aero Soln inhalation aerosol Inhale 1-2 Puffs by mouth as needed.     • escitalopram (LEXAPRO) 20 MG tablet Take 10 mg by mouth every day.     • DILTIAZem CD (CARDIZEM CD) 120 MG CAPSULE SR 24 HR Take 1 Cap by mouth every day. 90 Cap 3   • losartan (COZAAR) 100 MG Tab Take 1 Tab by mouth every day. (Patient taking differently: Take 100 mg by mouth every morning.) 90 Tab 3   • spironolactone (ALDACTONE) 25 MG Tab Take 1 Tab by mouth every day. 90 Tab 3   • simvastatin (ZOCOR) 40 MG TABS Take 1 Tab by mouth every evening. 90 Tab 3   • furosemide (LASIX) 40 MG TABS Take 40 mg by mouth every day.     • omeprazole (PRILOSEC) 20 MG CPDR Take 20 mg by mouth 2 times a day.     • warfarin (COUMADIN) 5 MG TABS Take 5 mg by mouth every day. 5 mg 5 days a week and 2.5 mg two days     • buPROPion SR (WELLBUTRIN-SR) 150 MG TB12 Take 150 mg by mouth every 48 hours.     • allopurinol (ZYLOPRIM) 100 MG TABS Take 100 mg by mouth every day.     • zolpidem (AMBIEN) 10 MG TABS Take 10 mg by mouth at bedtime as needed.     • amoxicillin (AMOXIL) 875 MG tablet   "      No facility-administered encounter medications on file as of 9/27/2018.      Review of Systems   Respiratory: Negative for cough and shortness of breath.    Cardiovascular: Negative for chest pain and palpitations.   Musculoskeletal: Negative for myalgias.   Neurological: Negative for dizziness and loss of consciousness.        Objective:   /84 (BP Location: Left arm, Patient Position: Sitting, BP Cuff Size: Adult)   Pulse 84   Resp 14   Ht 1.778 m (5' 10\")   Wt 111.1 kg (245 lb)   SpO2 96%   BMI 35.15 kg/m²     Physical Exam   Constitutional: He is oriented to person, place, and time. He appears well-developed and well-nourished.   Neck: No JVD present.   Cardiovascular: Normal rate, normal heart sounds and intact distal pulses.  An irregularly irregular rhythm present.   No murmur heard.  Pulmonary/Chest: Effort normal and breath sounds normal. No respiratory distress. He has no wheezes. He has no rales.   Increased AP diameter.   Abdominal:   Markedly protuberant.   Musculoskeletal: He exhibits no edema.   Neurological: He is alert and oriented to person, place, and time.   Skin: Skin is warm and dry.   Psychiatric: He has a normal mood and affect. His behavior is normal.     ECHOCARDIOGRAM 06/06/2013  Left ejection fraction 55-60%.  Mild concentric left ventricular hypertrophy.  Mild mitral regurgitation.  Severe left atrial dilatation.  Moderate right atrial dilatation.     EKG 04/25/2013 atrial fibrillation, rate 83.    Assessment:     1. Atrial fibrillation, chronic (HCC)     2. Chronic anticoagulation     3. Essential hypertension         Medical Decision Making:  Today's Assessment / Status / Plan:   Atrial fibrillation. Chronic. Rate controlled. Asymptomatic.     Hypertension.  Currently controlled.     Anticoagulation. On warfarin. Monitored by PCP and an outside home monitoring system.     Hyperlipidemia. On simvastatin.     Chronic kidney disease with worsening renal " function.     Lower extremity edema.  Essentially resolved with reduction of Cardizem.    Dizziness/vertigo contemporaneously associated with increase in losartan dose now improved.     Recommendation and recommendation  1.  The patient's current cardiac status is stable.  2.  Continue current cardiac therapy.  3.  Follow-up 6 months.

## 2018-10-09 DIAGNOSIS — I10 ESSENTIAL HYPERTENSION: ICD-10-CM

## 2018-10-09 RX ORDER — SPIRONOLACTONE 25 MG/1
25 TABLET ORAL DAILY
Qty: 90 TAB | Refills: 3 | Status: CANCELLED | OUTPATIENT
Start: 2018-10-09

## 2018-10-12 DIAGNOSIS — I10 ESSENTIAL HYPERTENSION: ICD-10-CM

## 2018-10-15 RX ORDER — SPIRONOLACTONE 25 MG/1
25 TABLET ORAL DAILY
Qty: 90 TAB | Refills: 3 | Status: SHIPPED | OUTPATIENT
Start: 2018-10-15 | End: 2020-01-30

## 2019-03-11 ENCOUNTER — TELEPHONE (OUTPATIENT)
Dept: CARDIOLOGY | Facility: MEDICAL CENTER | Age: 69
End: 2019-03-11

## 2019-03-11 NOTE — TELEPHONE ENCOUNTER
diltiazem/simvastatin   Received: Today   Message Contents   Mirelamirna Rodriguez, Med Ass't  Ansley Shah R.N.   Phone Number: [phone]             SW     Pt left a vm stating his PCP told him diltiazem & simvastatin should not be taken together. Pt would like clarification.   #: [phone]      Attempted to contact patient. No answer. LVM to call back.

## 2019-03-12 NOTE — TELEPHONE ENCOUNTER
2nd attempt to contact patient.  Contacted patient, discussed medication and patient being monitored and previous labs (LFTs) all WNL. Patient has no history of any liver disease.  Patient has next FV in June.  Patient did have recent labs at [facility] in January 9, 2019 ordered by Nephrologist.  [facility]'s chart. [facility] to fax to [phone] and will place results to scanning when received.

## 2019-06-05 ENCOUNTER — TELEPHONE (OUTPATIENT)
Dept: CARDIOLOGY | Facility: MEDICAL CENTER | Age: 69
End: 2019-06-05

## 2019-06-05 ENCOUNTER — OFFICE VISIT (OUTPATIENT)
Dept: CARDIOLOGY | Facility: MEDICAL CENTER | Age: 69
End: 2019-06-05
Payer: MEDICARE

## 2019-06-05 VITALS
WEIGHT: 237 LBS | OXYGEN SATURATION: 93 % | HEIGHT: 70 IN | DIASTOLIC BLOOD PRESSURE: 68 MMHG | BODY MASS INDEX: 33.93 KG/M2 | SYSTOLIC BLOOD PRESSURE: 120 MMHG | HEART RATE: 82 BPM

## 2019-06-05 DIAGNOSIS — Z79.01 CHRONIC ANTICOAGULATION: Chronic | ICD-10-CM

## 2019-06-05 DIAGNOSIS — R05.9 COUGH: ICD-10-CM

## 2019-06-05 DIAGNOSIS — I48.20 ATRIAL FIBRILLATION, CHRONIC (HCC): ICD-10-CM

## 2019-06-05 DIAGNOSIS — I10 ESSENTIAL HYPERTENSION: Chronic | ICD-10-CM

## 2019-06-05 DIAGNOSIS — E78.00 PURE HYPERCHOLESTEROLEMIA: Chronic | ICD-10-CM

## 2019-06-05 PROCEDURE — 99214 OFFICE O/P EST MOD 30 MIN: CPT | Performed by: INTERNAL MEDICINE

## 2019-06-05 RX ORDER — DILTIAZEM HYDROCHLORIDE 120 MG/1
120 CAPSULE, COATED, EXTENDED RELEASE ORAL DAILY
Qty: 90 CAP | Refills: 3 | Status: SHIPPED | OUTPATIENT
Start: 2019-06-05 | End: 2019-12-05 | Stop reason: SDUPTHER

## 2019-06-05 ASSESSMENT — ENCOUNTER SYMPTOMS
LOSS OF CONSCIOUSNESS: 0
PALPITATIONS: 0
COUGH: 0
SHORTNESS OF BREATH: 0
BRUISES/BLEEDS EASILY: 0
DIZZINESS: 0
MYALGIAS: 0

## 2019-06-05 NOTE — TELEPHONE ENCOUNTER
Lab results   Received: Today   Message Contents   LEX Viveros R.N.             Please obtain most recent lab results from [facility]   Scan in media.   Thank you      Task to M.A.

## 2019-06-05 NOTE — LETTER
[facility]   [address] [place] [address]  [place] Chen [address]  Phone: [phone]  Fax: [phone]              Zander Stewart  1950    Encounter Date: 6/5/2019    Eugene Calderón M.D.          PROGRESS NOTE:  Chief Complaint   Patient presents with   • Atrial Fibrillation       Subjective:   Zander Stewart is a 69 y.o. male who presents today for follow-up evaluation of chronic atrial fibrillation, chronic anticoagulation, hypertension and hyperlipidemia in addition he has a history of obstructive sleep apnea with intolerance to CPAP therapy.     Last seen on 9/27/2018.    Since 9/27/2018 the patient said no cardiac problems or symptoms.  He has noted a slightly productive cough and mild fever last night.  Has a follow-up appointment with PCP in the near future.    Since 5/1/2018 appointment the patient developed dizziness and balance problems with vertigo with increasing dose of losartan which he reduced back to his previous dose with resolution of his dizziness and balance problems.   No bleeding problems.  No other cardiac symptoms.  Remains active playing golf.  Just returned from a trip visiting his daughter in [place], [place].    Since 3/19/2018 the patient's lower extremity edema has resolved after reduction of Cardizem CD from 360 mg to 120 mg.  The patient has closely monitor his blood pressure fluctuates on average in the 140s to 150 range.  No other cardiac symptoms.     The patient was recently on 2/26/2018 by Dr. Scot Curry for uncontrolled hypertension resulting in cancellation of left knee arthroscopy.  Dr. Curry increased losartan from 50 mg daily to 100 mg daily.  The patient intermittently checks his blood pressure and notices it to be higher in the morning.  Recent BMP shows worsening renal function.  Complains of lower extremity edema which he attributes to Cardizem after researching it on the Internet.     Previously followed by  Dr. Drake Westbrook, electrophysiologist and Dr. Scot Hernández, retired cardiologist     Past Medical History:   Diagnosis Date   • Atrial fibrillation (HCC)    • Atrial flutter (HCC)    • Chronic anticoagulation    • Polycystic kidney, unspecified type    • Pure hypercholesterolemia    • Sleep apnea     on oxygen, unable to tolerate CPAP.   • Unspecified essential hypertension    • Unspecified sleep apnea      Past Surgical History:   Procedure Laterality Date   • APPENDECTOMY     • OTHER      Cardiac Cath   • OTHER      Kidney Surgery   • OTHER      Nose Surgery   • OTHER      Tonsillectomy with Adenoidectomy     Family History   Problem Relation Age of Onset   • Heart Disease Neg Hx      Social History     Social History   • Marital status:      Spouse name: N/A   • Number of children: N/A   • Years of education: N/A     Occupational History   • Not on file.     Social History Main Topics   • Smoking status: Current Every Day Smoker     Types: Cigars   • Smokeless tobacco: Never Used   • Alcohol use Yes      Comment: moderate   • Drug use: No   • Sexual activity: Not on file     Other Topics Concern   • Not on file     Social History Narrative   • No narrative on file     No Known Allergies  Outpatient Encounter Prescriptions as of 6/5/2019   Medication Sig Dispense Refill   • spironolactone (ALDACTONE) 25 MG Tab Take 1 Tab by mouth every day. 90 Tab 3   • VENTOLIN  (90 Base) MCG/ACT Aero Soln inhalation aerosol Inhale 1-2 Puffs by mouth as needed.     • escitalopram (LEXAPRO) 20 MG tablet Take 10 mg by mouth every day.     • DILTIAZem CD (CARDIZEM CD) 120 MG CAPSULE SR 24 HR Take 1 Cap by mouth every day. 90 Cap 3   • losartan (COZAAR) 100 MG Tab Take 1 Tab by mouth every day. (Patient taking differently: Take 100 mg by mouth every morning.) 90 Tab 3   • simvastatin (ZOCOR) 40 MG TABS Take 1 Tab by mouth every evening. 90 Tab 3   • furosemide (LASIX) 40 MG TABS Take 40 mg by mouth every day.     •  "omeprazole (PRILOSEC) 20 MG CPDR Take 20 mg by mouth 2 times a day.     • warfarin (COUMADIN) 5 MG TABS Take 5 mg by mouth every day. 5 mg 5 days a week and 2.5 mg two days     • buPROPion SR (WELLBUTRIN-SR) 150 MG TB12 Take 150 mg by mouth every 48 hours.     • allopurinol (ZYLOPRIM) 100 MG TABS Take 100 mg by mouth every day.     • zolpidem (AMBIEN) 10 MG TABS Take 10 mg by mouth at bedtime as needed.     • amoxicillin (AMOXIL) 875 MG tablet        No facility-administered encounter medications on file as of 6/5/2019.      Review of Systems   Respiratory: Negative for cough and shortness of breath.    Cardiovascular: Negative for chest pain and palpitations.   Musculoskeletal: Negative for myalgias.   Neurological: Negative for dizziness and loss of consciousness.   Endo/Heme/Allergies: Does not bruise/bleed easily.        Objective:   /68 (BP Location: Left arm, Patient Position: Sitting, BP Cuff Size: Adult)   Pulse 82   Ht 1.778 m (5' 10\")   Wt 107.5 kg (237 lb)   SpO2 93%   BMI 34.01 kg/m²      Physical Exam   Constitutional: He is oriented to person, place, and time. He appears well-developed and well-nourished.   Neck: No JVD present.   Cardiovascular: Normal rate, normal heart sounds and intact distal pulses.  An irregularly irregular rhythm present.   No murmur heard.  Pulmonary/Chest: Effort normal and breath sounds normal. No respiratory distress. He has no wheezes. He has no rales.   Increased AP diameter.   Abdominal:   Markedly protuberant.   Musculoskeletal: He exhibits no edema.   Chronic dermatologic changes lower extremities.   Neurological: He is alert and oriented to person, place, and time.   Skin: Skin is warm and dry.   Psychiatric: He has a normal mood and affect. His behavior is normal.     ECHOCARDIOGRAM 06/06/2013  Left ejection fraction 55-60%.  Mild concentric left ventricular hypertrophy.  Mild mitral regurgitation.  Severe left atrial dilatation.  Moderate right atrial " dilatation.     EKG 04/25/2013 atrial fibrillation, rate 83.    Assessment:     1. Atrial fibrillation, chronic (HCC)     2. Chronic anticoagulation     3. Essential hypertension     4. Pure hypercholesterolemia         Medical Decision Making:  Today's Assessment / Status / Plan:   Atrial fibrillation. Chronic. Rate controlled. Asymptomatic.     Hypertension.  Currently controlled.     Anticoagulation. On warfarin. Monitored by PCP and an outside home monitoring system.     Hyperlipidemia. On simvastatin.     Chronic kidney disease.     Lower extremity edema.  Essentially resolved with reduction of Cardizem.    Cough low-grade fever.    Recommendation and recommendation  1.  The patient's current cardiac status is stable.  2.  Continue current cardiac therapy.  3.  Obtain most recent blood work at [facility] from January.  4.  CXR and instructed to follow-up with PCP.  5.  Renew diltiazem.  6.  Follow-up 6 months.      Terri Jimenez, F.N.P.  [address] [facility]  [place] [address]  VIA Facsimile: [phone]

## 2019-06-05 NOTE — PROGRESS NOTES
Chief Complaint   Patient presents with   • Atrial Fibrillation       Subjective:   Zander Stewart is a 69 y.o. male who presents today for follow-up evaluation of chronic atrial fibrillation, chronic anticoagulation, hypertension and hyperlipidemia in addition he has a history of obstructive sleep apnea with intolerance to CPAP therapy.     Last seen on 9/27/2018.    Since 9/27/2018 the patient said no cardiac problems or symptoms.  He has noted a slightly productive cough and mild fever last night.  Has a follow-up appointment with PCP in the near future.    Since 5/1/2018 appointment the patient developed dizziness and balance problems with vertigo with increasing dose of losartan which he reduced back to his previous dose with resolution of his dizziness and balance problems.   No bleeding problems.  No other cardiac symptoms.  Remains active playing golf.  Just returned from a trip visiting his daughter in [place], [place].    Since 3/19/2018 the patient's lower extremity edema has resolved after reduction of Cardizem CD from 360 mg to 120 mg.  The patient has closely monitor his blood pressure fluctuates on average in the 140s to 150 range.  No other cardiac symptoms.     The patient was recently on 2/26/2018 by Dr. Scot Curry for uncontrolled hypertension resulting in cancellation of left knee arthroscopy.  Dr. Curry increased losartan from 50 mg daily to 100 mg daily.  The patient intermittently checks his blood pressure and notices it to be higher in the morning.  Recent BMP shows worsening renal function.  Complains of lower extremity edema which he attributes to Cardizem after researching it on the Internet.     Previously followed by Dr. Drake Westbrook, electrophysiologist and Dr. Scot Hernández, retired cardiologist     Past Medical History:   Diagnosis Date   • Atrial fibrillation (HCC)    • Atrial flutter (HCC)    • Chronic anticoagulation    • Polycystic kidney, unspecified type    • Pure  hypercholesterolemia    • Sleep apnea     on oxygen, unable to tolerate CPAP.   • Unspecified essential hypertension    • Unspecified sleep apnea      Past Surgical History:   Procedure Laterality Date   • APPENDECTOMY     • OTHER      Cardiac Cath   • OTHER      Kidney Surgery   • OTHER      Nose Surgery   • OTHER      Tonsillectomy with Adenoidectomy     Family History   Problem Relation Age of Onset   • Heart Disease Neg Hx      Social History     Social History   • Marital status:      Spouse name: N/A   • Number of children: N/A   • Years of education: N/A     Occupational History   • Not on file.     Social History Main Topics   • Smoking status: Current Every Day Smoker     Types: Cigars   • Smokeless tobacco: Never Used   • Alcohol use Yes      Comment: moderate   • Drug use: No   • Sexual activity: Not on file     Other Topics Concern   • Not on file     Social History Narrative   • No narrative on file     No Known Allergies  Outpatient Encounter Prescriptions as of 6/5/2019   Medication Sig Dispense Refill   • spironolactone (ALDACTONE) 25 MG Tab Take 1 Tab by mouth every day. 90 Tab 3   • VENTOLIN  (90 Base) MCG/ACT Aero Soln inhalation aerosol Inhale 1-2 Puffs by mouth as needed.     • escitalopram (LEXAPRO) 20 MG tablet Take 10 mg by mouth every day.     • DILTIAZem CD (CARDIZEM CD) 120 MG CAPSULE SR 24 HR Take 1 Cap by mouth every day. 90 Cap 3   • losartan (COZAAR) 100 MG Tab Take 1 Tab by mouth every day. (Patient taking differently: Take 100 mg by mouth every morning.) 90 Tab 3   • simvastatin (ZOCOR) 40 MG TABS Take 1 Tab by mouth every evening. 90 Tab 3   • furosemide (LASIX) 40 MG TABS Take 40 mg by mouth every day.     • omeprazole (PRILOSEC) 20 MG CPDR Take 20 mg by mouth 2 times a day.     • warfarin (COUMADIN) 5 MG TABS Take 5 mg by mouth every day. 5 mg 5 days a week and 2.5 mg two days     • buPROPion SR (WELLBUTRIN-SR) 150 MG TB12 Take 150 mg by mouth every 48 hours.     •  "allopurinol (ZYLOPRIM) 100 MG TABS Take 100 mg by mouth every day.     • zolpidem (AMBIEN) 10 MG TABS Take 10 mg by mouth at bedtime as needed.     • amoxicillin (AMOXIL) 875 MG tablet        No facility-administered encounter medications on file as of 6/5/2019.      Review of Systems   Respiratory: Negative for cough and shortness of breath.    Cardiovascular: Negative for chest pain and palpitations.   Musculoskeletal: Negative for myalgias.   Neurological: Negative for dizziness and loss of consciousness.   Endo/Heme/Allergies: Does not bruise/bleed easily.        Objective:   /68 (BP Location: Left arm, Patient Position: Sitting, BP Cuff Size: Adult)   Pulse 82   Ht 1.778 m (5' 10\")   Wt 107.5 kg (237 lb)   SpO2 93%   BMI 34.01 kg/m²     Physical Exam   Constitutional: He is oriented to person, place, and time. He appears well-developed and well-nourished.   Neck: No JVD present.   Cardiovascular: Normal rate, normal heart sounds and intact distal pulses.  An irregularly irregular rhythm present.   No murmur heard.  Pulmonary/Chest: Effort normal and breath sounds normal. No respiratory distress. He has no wheezes. He has no rales.   Increased AP diameter.   Abdominal:   Markedly protuberant.   Musculoskeletal: He exhibits no edema.   Chronic dermatologic changes lower extremities.   Neurological: He is alert and oriented to person, place, and time.   Skin: Skin is warm and dry.   Psychiatric: He has a normal mood and affect. His behavior is normal.     ECHOCARDIOGRAM 06/06/2013  Left ejection fraction 55-60%.  Mild concentric left ventricular hypertrophy.  Mild mitral regurgitation.  Severe left atrial dilatation.  Moderate right atrial dilatation.     EKG 04/25/2013 atrial fibrillation, rate 83.    Assessment:     1. Atrial fibrillation, chronic (HCC)     2. Chronic anticoagulation     3. Essential hypertension     4. Pure hypercholesterolemia         Medical Decision Making:  Today's Assessment / " Status / Plan:   Atrial fibrillation. Chronic. Rate controlled. Asymptomatic.     Hypertension.  Currently controlled.     Anticoagulation. On warfarin. Monitored by PCP and an outside home monitoring system.     Hyperlipidemia. On simvastatin.     Chronic kidney disease.     Lower extremity edema.  Essentially resolved with reduction of Cardizem.    Cough low-grade fever.    Recommendation and recommendation  1.  The patient's current cardiac status is stable.  2.  Continue current cardiac therapy.  3.  Obtain most recent blood work at [facility] from January.  4.  CXR and instructed to follow-up with PCP.  5.  Renew diltiazem.  6.  Follow-up 6 months.

## 2019-07-16 DIAGNOSIS — I10 HTN (HYPERTENSION), MALIGNANT: ICD-10-CM

## 2019-07-22 RX ORDER — LOSARTAN POTASSIUM 100 MG/1
100 TABLET ORAL DAILY
Qty: 90 TAB | Refills: 3 | Status: SHIPPED | OUTPATIENT
Start: 2019-07-22 | End: 2020-08-24

## 2019-08-12 NOTE — PROGRESS NOTES
Chief Complaint   Patient presents with   • New Patient     Left foot and leg pain       Problem List Items Addressed This Visit     None      Visit Diagnoses     Numbness and tingling of both lower extremities        Relevant Medications    gabapentin (NEURONTIN) 300 MG Cap    Other Relevant Orders    REFERRAL TO NEURODIAGNOSTICS (EEG,EP,EMG/NCS/DBS) Modality Requested: EMG/NCS-Comment Extremities (LUE, BLE)          History of present illness:  Zander Stewart 69 y.o. male presents today for neuropathy eval.    Pt reports of symptoms of constant numbness, tingling and intermittent sharp stabbing pains in the feet that started over a year ago. Sx started in toes/ feet and now he is feeling sx up to his calves. Left is worse than the right. He also c/o of stiffness in feet. Walking on gravel aggravates it. Nothing makes it better per pt. No weakness.      Denies autonomic sx. Denies exposure to toxins or heavy metals.     Pt also c/o of numbness in the 4th and 5th digits of his left hand that has been going on for >10 years. Pt has forgotten about this and does not bother him much. 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     He is seeing a podiatrist and was prescribed gabapentin 300mg daily. No side effects. He reports taking amitriptyline before but he wasn't sure what that was for.     No family hx of neuropathy. No autoimmune disorder.     Denies diabetes or history of elevated blood sugar.     Not drinking alcohol. He smokes cigars. No recreational drug use.          Past medical history:   Past Medical History:   Diagnosis Date   • Atrial fibrillation (HCC)    • Atrial flutter (HCC)    • Chronic anticoagulation    • Polycystic kidney, unspecified type    • Pure hypercholesterolemia    • Sleep apnea     on oxygen, unable to tolerate CPAP.   • Unspecified essential hypertension    • Unspecified sleep apnea        Past surgical history:   Past Surgical History:   Procedure  Laterality Date   • APPENDECTOMY     • OTHER      Cardiac Cath   • OTHER      Kidney Surgery   • OTHER      Nose Surgery   • OTHER      Tonsillectomy with Adenoidectomy       Family history:   Family History   Problem Relation Age of Onset   • Heart Disease Neg Hx        Social history:   Social History     Socioeconomic History   • Marital status:      Spouse name: Not on file   • Number of children: Not on file   • Years of education: Not on file   • Highest education level: Not on file   Occupational History   • Not on file   Social Needs   • Financial resource strain: Not on file   • Food insecurity:     Worry: Not on file     Inability: Not on file   • Transportation needs:     Medical: Not on file     Non-medical: Not on file   Tobacco Use   • Smoking status: Current Every Day Smoker     Types: Cigars   • Smokeless tobacco: Never Used   Substance and Sexual Activity   • Alcohol use: Yes     Comment: moderate   • Drug use: No   • Sexual activity: Not on file   Lifestyle   • Physical activity:     Days per week: Not on file     Minutes per session: Not on file   • Stress: Not on file   Relationships   • Social connections:     Talks on phone: Not on file     Gets together: Not on file     Attends Restorationist service: Not on file     Active member of club or organization: Not on file     Attends meetings of clubs or organizations: Not on file     Relationship status: Not on file   • Intimate partner violence:     Fear of current or ex partner: Not on file     Emotionally abused: Not on file     Physically abused: Not on file     Forced sexual activity: Not on file   Other Topics Concern   • Not on file   Social History Narrative   • Not on file       Current medications:   Current Outpatient Medications   Medication   • losartan (COZAAR) 100 MG Tab   • DILTIAZem CD (CARDIZEM CD) 120 MG CAPSULE SR 24 HR   • spironolactone (ALDACTONE) 25 MG Tab   • VENTOLIN  (90 Base) MCG/ACT Aero Soln inhalation  "aerosol   • amoxicillin (AMOXIL) 875 MG tablet   • escitalopram (LEXAPRO) 20 MG tablet   • simvastatin (ZOCOR) 40 MG TABS   • furosemide (LASIX) 40 MG TABS   • omeprazole (PRILOSEC) 20 MG CPDR   • warfarin (COUMADIN) 5 MG TABS   • buPROPion SR (WELLBUTRIN-SR) 150 MG TB12   • allopurinol (ZYLOPRIM) 100 MG TABS   • zolpidem (AMBIEN) 10 MG TABS     No current facility-administered medications for this visit.        Medication Allergy:  No Known Allergies    Review of systems:     General: Denies fevers or chills, or nightsweats, or generalized fatigue.    Head: Denies headaches or dizziness or lightheadedness  EENT: Denies vision changes, vision loss or pain, nasal secretion, nasal bleeding, difficulty swallowing, hearing loss, tinnitus, vertigo, ear pain  Respiratory: Denies shortness of breath, cough, sputum, or wheezing  Cardiac: Denies chest pain, palpitations, edema or syncope  Gastrointestinal: Denies nausea, vomiting, no abdominal pain or change in bowel habits, no melena or hematochezia  Urinary: Denies dysuria, frequency, hesitancy, or incontinence.  Dermatologic:  Denies new rash  Musculoskeletal: Denies muscle pain or swelling, no atrophy, no neck and back pain or stiffness.   Neurologic: Denies facial droopiness, muscle weakness (focal or generalized), ataxia, change in speech or language, memory loss, abnormal movements, seizures, loss of consciousness, or episodes of confusion.   Psychiatric: Denies anxiety, depression, mood swings, suicidal or homicidal thoughts       Physical examination:   Vitals:    08/13/19 1302   BP: 122/70   BP Location: Right arm   Patient Position: Sitting   BP Cuff Size: Adult   Pulse: 96   Resp: 16   Temp: 36.2 °C (97.1 °F)   TempSrc: Temporal   SpO2: 95%   Weight: 109.3 kg (241 lb)   Height: 1.778 m (5' 10\")     General: Patient in no acute distress, pleasant and cooperative.  HEENT: Normocephalic, no signs of acute trauma.   moist conjunctivae. Nares are patent. Oropharynx " clear without lesions and normal  hard and soft palates.   Neck: Supple. There is normal range of motion.   Resp: clear to auscultation bilaterally. No wheezes or crackles.   CV: RRR, no murmurs.   Skin: no signs of acute rashes or trauma.   Musculoskeletal: joints exhibit full range of motion, without any pain to palpation. There are no signs of joint or muscle swelling. There is no tenderness to deep palpation of muscles.   Psychiatric: No hallucinatory behavior. No symptoms of depression or suicidal ideation. Mood and affect appear normal on exam.     NEUROLOGICAL EXAM:   Mental status, orientation: Awake, alert and fully oriented.   Speech and language: speech is clear and fluent. The patient is able to name, repeat and comprehend.   Memory: There is intact recollection of recent and remote events.   Cranial nerve exam:   CN I: Not examined   CN II: PERRL.   CN III, IV, VI: EOMI; no nystagmus   CN V: Facial sensation intact bilaterally   CN VII: face symmetric   CN VIII: hearing intact to finger rub bilaterally   CN IX, X: palate elevates symmetrically   CN XI: Symmetric shoulder shrug  CN XII: tongue midline. No signs of tongue biting or fasciculations   Motor exam: Strength is 5/5 in all extremities. Tone is normal. No abnormal movements were seen on exam.   Sensory exam reveals normal sense of light touch Decreased proprioception and vibration lower extremities.   Deep tendon reflexes: absent ankle jerks, 2+ throughout.    Coordination: shows a normal finger-nose-finger. Normal rapidly alternating movements.   Gait: The patient was able to get up from seated position on first attempt without requiring assistance. Found to be steady when walking. Movements were fluid with normal arm swing. The patient was able to turn without difficulties or tendency to fall. Unsteady tandem gait.  Romberg exam swaying    ANCILLARY DATA REVIEWED:     Lab Data Review:  Reviewed in chart. CBC, CMP, Vit D    Records reviewed:    Reviewed in chart.     Imaging:   n/a        ASSESSMENT AND PLAN:    1. Neuropathy (HCC)    2. Numbness and tingling of both lower extremities  - gabapentin (NEURONTIN) 300 MG Cap; Take 1 Cap by mouth 2 Times a Day.  Dispense: 60 Cap; Refill: 11  - REFERRAL TO NEURODIAGNOSTICS (EEG,EP,EMG/NCS/DBS) Modality Requested: EMG/NCS-Comment Extremities (LUE, BLE)    3. Numbness    4. Stabbing pain        CLINICAL DISCUSSION:  Sx of paresthesia and intermittent stabbing pain that started in his toes now up to his calves maybe suggestive of a length dependent peripheral neuropathy. Etiology unknown. Left worse than right. No weakness. No autonomic sx. Pt has no hx of elevated blood sugar or exposure to toxin or heavy metals. Pt started taking gabapentin 300mg daily as prescribed by his podiatrist and has found mild benefit from this.      He also has c/o numbness in his 4th and 5th digits in the left hand that was thought to be d/t CTS and had a surgical release. This might be an entrapment neuropathy of the ulnar nerve and not the median. This does not bother pt anymore but still wants to know the cause.     No family hx of neuropathy. No autoimmune disorder.     Not drinking alcohol.     Pt aware that if he does have polyneuropathy, this is a progressive disease and there is no cure at this time. We can only mask the symptoms and attempt to identify cause to halt/ slow down the progression. Pt verbalized understanding.     Plan:  - Increase gabapentin to 300mg BID. Pt is aware of side effects.     -NCS/EMG BLE and LUE    - discussed lifestyle modification including diet and exercise.     -We may potentially obtain lab work to r/o other causes of neuropathy during next visit.           FOLLOW-UP:   Return in about 3 months (around 11/13/2019). or after EMG              EDUCATION AND COUNSELING:  -Discussed regular exercise program and prevention of cardiovascular disease, including stroke.   -Discussed healthy lifestyle,  including: healthy diet (rich in fruits, vegetables, nuts and healthy oils); proper hydration, and adequate sleep hygiene (allowing 7-8 hrs of overnight sleep).    The patient understands and agrees that due to the complexity of his/her diagnosis, results of any testing and further recommendations will typically be discussed/made during a face to face encounter in my office. The patient and/or family further understands it is their responsibility to keep proper follow up.         Yuli Nance, MSN, APRN, FNP-C  [facility]  Office: [phone]  Fax: [phone]    BILLING DOCUMENTATION:     (a) Counseling:  I spent greater than 50% time face-to-face time of a total of 60 mins visit. Over 50% of the time of the visit today was spent on counseling and or coordination of care wtih the patient/family, with greater than 50% of the total time discussing:   o Diagnostic results, impressions, and/or recommended diagnostic studies, and coordination of care.   o Prognosis.  o Treatment recommendations, including risks, benefits, & alternatives.  o Instructions for treatment/management and/or follow-up.  o Importance of compliance with chosen treatment/management options.  o Risk factor modification.   o Patient & family education.  o Provided business card and/or instructions for follow-up & emergencies.

## 2019-08-13 ENCOUNTER — OFFICE VISIT (OUTPATIENT)
Dept: NEUROLOGY | Facility: MEDICAL CENTER | Age: 69
End: 2019-08-13
Payer: MEDICARE

## 2019-08-13 VITALS
BODY MASS INDEX: 34.5 KG/M2 | SYSTOLIC BLOOD PRESSURE: 122 MMHG | HEART RATE: 96 BPM | DIASTOLIC BLOOD PRESSURE: 70 MMHG | TEMPERATURE: 97.1 F | WEIGHT: 241 LBS | OXYGEN SATURATION: 95 % | RESPIRATION RATE: 16 BRPM | HEIGHT: 70 IN

## 2019-08-13 DIAGNOSIS — G62.9 NEUROPATHY: ICD-10-CM

## 2019-08-13 DIAGNOSIS — R20.0 NUMBNESS AND TINGLING OF BOTH LOWER EXTREMITIES: ICD-10-CM

## 2019-08-13 DIAGNOSIS — R52 STABBING PAIN: ICD-10-CM

## 2019-08-13 DIAGNOSIS — R20.0 NUMBNESS: ICD-10-CM

## 2019-08-13 DIAGNOSIS — R20.2 NUMBNESS AND TINGLING OF BOTH LOWER EXTREMITIES: ICD-10-CM

## 2019-08-13 PROCEDURE — 99205 OFFICE O/P NEW HI 60 MIN: CPT | Performed by: NURSE PRACTITIONER

## 2019-08-13 RX ORDER — GABAPENTIN 300 MG/1
300 CAPSULE ORAL 2 TIMES DAILY
Qty: 60 CAP | Refills: 11 | Status: SHIPPED | OUTPATIENT
Start: 2019-08-13 | End: 2019-11-12

## 2019-08-13 RX ORDER — GABAPENTIN 300 MG/1
CAPSULE ORAL
COMMUNITY
Start: 2019-07-24 | End: 2019-08-13 | Stop reason: SDUPTHER

## 2019-08-13 ASSESSMENT — PATIENT HEALTH QUESTIONNAIRE - PHQ9: CLINICAL INTERPRETATION OF PHQ2 SCORE: 0

## 2019-10-24 ENCOUNTER — TELEPHONE (OUTPATIENT)
Dept: NEUROLOGY | Facility: MEDICAL CENTER | Age: 69
End: 2019-10-24

## 2019-10-24 ENCOUNTER — NON-PROVIDER VISIT (OUTPATIENT)
Dept: NEUROLOGY | Facility: MEDICAL CENTER | Age: 69
End: 2019-10-24
Payer: MEDICARE

## 2019-10-24 ENCOUNTER — APPOINTMENT (OUTPATIENT)
Dept: NEUROLOGY | Facility: MEDICAL CENTER | Age: 69
End: 2019-10-24
Payer: MEDICARE

## 2019-10-24 DIAGNOSIS — R20.0 NUMBNESS AND TINGLING OF BOTH LOWER EXTREMITIES: ICD-10-CM

## 2019-10-24 DIAGNOSIS — R20.2 PARESTHESIA: ICD-10-CM

## 2019-10-24 DIAGNOSIS — R20.2 NUMBNESS AND TINGLING OF BOTH LOWER EXTREMITIES: ICD-10-CM

## 2019-10-24 PROCEDURE — 95886 MUSC TEST DONE W/N TEST COMP: CPT | Performed by: PSYCHIATRY & NEUROLOGY

## 2019-10-24 PROCEDURE — 95911 NRV CNDJ TEST 9-10 STUDIES: CPT | Performed by: PSYCHIATRY & NEUROLOGY

## 2019-10-24 NOTE — TELEPHONE ENCOUNTER
Patient had EMG done today and is asking if you would like to schedule a follow up to discuss results.    Please advise.

## 2019-10-24 NOTE — PROCEDURES
"NERVE CONDUCTION STUDIES AND ELECTROMYOGRAPHY REPORT  [facility]  10/24/19           IMPRESSION:  This is an abnormal electrodiagnostic study due to:  1. Unobtainable bilateral sural sensory responses which can be seen in advanced age or secondary to early sensory peripheral neuropathy.  2. 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  3. Chronic inactive reinnervation of the left triceps which can be seen in old C6/7 radiculopathy but in itself is not diagnostic.    There is no evidence of left median or ulnar neuropathy.  Recommend clinical correlation.  Consider repeat testing in 6 to 12 months if clinically indicated to evaluate for progression.      Terri Bennett MD  Neurology - Neurophysiology  [facility]        REASON FOR REFERRAL:  Mr. Zander Stewart 69 y.o. referred by ALIDA Christopher for evaluation of numbness/tingling/pain in the bilateral feet with progression up to the calves, left slightly worse than right.  Symptoms have been present for over a year.  He also has left hand numbness in the fourth and fifth fingers for over 10 years.  He has had a carpal tunnel release in that extremity without significant improvement. He is on warfarin with INR 2.9 yesterday.  He has mild/moderate lower extremity edema.    Height: 5'10\"  Weight: 236 lbs      ELECTRODIAGNOSTIC EXAMINATION:  Nerve conduction studies (NCS) and electromyography (EMG) are utilized to evaluate direct or indirect damage to the peripheral nervous system. NCS are performed to measure the nerve(s) response(s) to electrostimulation across a given nerve segment. EMG evaluates the passive and active electrical activity of the muscle(s) in question.  Muscles are innervated by specific peripheral nerves and roots. Often times, several nerves the muscle to be examined in order to determine the presence or " absence of the disease process. Furthermore, nerves and muscles may need to be tested in a [id number] comparison, as well as in additional extremities, as this may be crucial in characterizing the extent of the disease process, which may be diffuse or isolated and of varying degree of severity. The extent of the neurodiagnostic exam is justified as it may help arrive to a proper diagnosis, which ultimately may contribute to better management of the patient. Therefore, the nerves to muscles examined during the study were medically necessary.    Unless otherwise noted, temperature of the extremity(s) study was monitored before and during the examination and remained between 32 and 36 degrees C for the upper extremities, and between 30 and 36 degrees C for the lower extremities. The patient tolerated testing well, without any complications.       NERVE CONDUCTION STUDY SUMMARY:  Selected nerves of the bilateral lower extremity and left upper extremity are studied.    Normal left median sensory and motor responses.  Normal left ulnar sensory motor responses.  Unobtainable bilateral sural sensory responses.  Abnormal left common peroneal motor response at the extensor digitorum brevis due to low amplitude.  Abnormal left common peroneal motor response at the tibialis anterior due to mildly decreased amplitude.  Normal right common peroneal motor response at the extensor digitorum brevis.  Normal left tibial motor response at the abductor hallucis brevis.  Abnormal right tibial motor response at the abductor hallucis brevis due to low amplitude.      NEEDLE EMG SUMMARY:  Concentric needle study of selected bilateral lower extremity and left upper extremity muscles is performed.     Insertion is normal in all muscles sampled.   Large amplitude prolonged duration motor unit potentials in the bilateral tibialis anterior and left triceps with normal recruitment.  Otherwise with activation, there are normal morphology  (amplitude/duration) motor unit action potentials firing with normal recruitment.       PATIENT DATA TABLES  Nerve Conduction Studies     Stim Site NR Onset (ms) Norm Onset (ms) O-P Amp (µV) Norm O-P Amp Site1 Site2 Delta-P (ms) Dist (cm) Hayden (m/s) Norm Hayden (m/s)   Left Sural Anti Sensory (Lat Mall)   Calf *NR  <4.6  >3 Calf Lat Mall  14.0  >40   Right Sural Anti Sensory (Lat Mall)   Calf *NR  <4.6  >3 Calf Lat Mall  14.0  >40   Left Ulnar Anti Sensory (5th Digit)   Wrist    2.8 <3.2 5.9 >5 Wrist 5th Digit 3.8 14.0 *37 >50        Stim Site NR Onset (ms) Norm Onset (ms) O-P Amp (mV) Norm O-P Amp Site1 Site2 Delta-0 (ms) Dist (cm) Hayden (m/s) Norm Hayden (m/s)   Left Median Motor (Abd Poll Brev)   Wrist    3.3 <4 5.8 >5 Elbow Wrist 4.9 26.0 53 >50   Elbow    8.2  4.2          Left Peroneal EDB Motor (Ext Dig Brev)   Ankle    3.6 <6 *1.9 >2.5 B Fib Ankle 7.8 33.0 42 >40   B Fib    11.4  1.5  Poplt B Fib 1.7 10.0 59    Poplt    13.1  1.5          Right Peroneal EDB Motor (Ext Dig Brev)   Ankle    4.1 <6 2.8 >2.5 B Fib Ankle 7.4 33.0 45 >40   B Fib    11.5  2.6  Poplt B Fib 2.0 10.0 50    Poplt    13.5  2.5          Left Peroneal TA Motor (AntTibialis)   Fib Head    3.0 <4.5 2.4 3 Poplit Fib Head 1.3 10.0 77 >40   Poplit    4.3  2.3                        Left Tibial Motor (Abd Perrin Brev)   Ankle    3.4 <6 5.6 >4 Knee Ankle 10.0 40.0 40 >40   Knee    13.4  4.4          Right Tibial Motor (Abd Perrin Brev)   Ankle    3.7 <6 *3.2 >4 Knee Ankle 9.2 42.0 46 >40   Knee    12.9  2.1          Left Ulnar Motor (Abd Dig Min)   Wrist    3.0 <3.1 7.0 >7 B Elbow Wrist 4.2 21.0 50 >50   B Elbow    7.2  7.0  A Elbow B Elbow 2.8 13.0 46    A Elbow    10.0  6.2               Stim Site NR Peak (ms) Norm Peak (ms) P-T Amp (µV) Site1 Site2 Delta-P (ms) Norm Delta (ms)   Left Median/Ulnar Palm Comparison (Wrist - 8cm)   Median Palm    2.3 <2.3 16.3 Median Palm Ulnar Palm  <0.3   Ulnar Palm *NR  <2.3                                          Electromyography     Side Muscle Nerve Root Ins Act Fibs Psw Amp Dur Poly Recrt Int Pat Comment   Left AntTibialis Dp Br Fibular L4-5 Nml Nml Nml *Incr *>12ms 0 Nml Nml    Left Gastroc Tibial S1-2 Nml Nml Nml Nml Nml 0 Nml Nml    Left VastusLat Femoral L2-4 Nml Nml Nml Nml Nml 0 Nml Nml    Left GluteusMed SupGluteal L5-S1 Nml Nml Nml Nml Nml 0 Nml Nml    Left VastusMed Femoral L2-4 Nml Nml Nml Nml Nml 0 Nml Nml    Left 1stDorInt Ulnar C8-T1 Nml Nml Nml Nml Nml 0 Nml Nml    Left PronatorTeres Median C6-7 Nml Nml Nml Nml Nml 0 Nml Nml    Left Biceps Musculocut C5-6 Nml Nml Nml Nml Nml 0 Nml Nml    Left Triceps Radial C6-7-8 Nml Nml Nml *Incr *>12ms 0 Nml Nml    Left Deltoid Axillary C5-6 Nml Nml Nml Nml Nml 0 Nml Nml    Right AntTibialis Dp Br Fibular L4-5 Nml Nml Nml *Incr *>12ms 0 Nml Nml    Right Gastroc Tibial S1-2 Nml Nml Nml Nml Nml 0 Nml Nml    Right VastusLat Femoral L2-4 Nml Nml Nml Nml Nml 0 Nml Nml    Right GluteusMed SupGluteal L5-S1 Nml Nml Nml Nml Nml 0 Nml Nml    Right VastusMed Femoral L2-4 Nml Nml Nml Nml Nml 0 Nml Nml

## 2019-11-12 ENCOUNTER — OFFICE VISIT (OUTPATIENT)
Dept: NEUROLOGY | Facility: MEDICAL CENTER | Age: 69
End: 2019-11-12
Payer: MEDICARE

## 2019-11-12 VITALS
WEIGHT: 236 LBS | BODY MASS INDEX: 33.79 KG/M2 | HEART RATE: 100 BPM | OXYGEN SATURATION: 94 % | SYSTOLIC BLOOD PRESSURE: 132 MMHG | TEMPERATURE: 97 F | HEIGHT: 70 IN | RESPIRATION RATE: 18 BRPM | DIASTOLIC BLOOD PRESSURE: 68 MMHG

## 2019-11-12 DIAGNOSIS — R52 STABBING PAIN: ICD-10-CM

## 2019-11-12 DIAGNOSIS — R20.0 NUMBNESS AND TINGLING OF BOTH LOWER EXTREMITIES: ICD-10-CM

## 2019-11-12 DIAGNOSIS — R20.2 NUMBNESS AND TINGLING OF BOTH LOWER EXTREMITIES: ICD-10-CM

## 2019-11-12 DIAGNOSIS — R42 ANTICONVULSANT-INDUCED DIZZINESS: ICD-10-CM

## 2019-11-12 DIAGNOSIS — T42.75XA ANTICONVULSANT-INDUCED DIZZINESS: ICD-10-CM

## 2019-11-12 DIAGNOSIS — Z13.31 SCREENING FOR DEPRESSION: ICD-10-CM

## 2019-11-12 DIAGNOSIS — R20.0 NUMBNESS AND TINGLING IN LEFT ARM: ICD-10-CM

## 2019-11-12 DIAGNOSIS — R20.2 NUMBNESS AND TINGLING IN LEFT ARM: ICD-10-CM

## 2019-11-12 DIAGNOSIS — Z72.0 TOBACCO ABUSE: ICD-10-CM

## 2019-11-12 DIAGNOSIS — G62.9 PERIPHERAL POLYNEUROPATHY: ICD-10-CM

## 2019-11-12 PROCEDURE — 99215 OFFICE O/P EST HI 40 MIN: CPT | Performed by: NURSE PRACTITIONER

## 2019-11-12 RX ORDER — PREGABALIN 50 MG/1
50 CAPSULE ORAL 2 TIMES DAILY
Qty: 60 CAP | Refills: 5 | Status: SHIPPED | OUTPATIENT
Start: 2019-11-12 | End: 2019-11-12 | Stop reason: SDUPTHER

## 2019-11-12 RX ORDER — PREGABALIN 50 MG/1
50 CAPSULE ORAL 2 TIMES DAILY
Qty: 60 CAP | Refills: 5 | Status: SHIPPED | OUTPATIENT
Start: 2019-11-12 | End: 2020-03-05

## 2019-11-12 NOTE — PROGRESS NOTES
Chief Complaint   Patient presents with   • Follow-Up     Neuropathy (HCC)       Problem List Items Addressed This Visit     None      Visit Diagnoses     Paresthesia        Numbness and tingling of both lower extremities        Relevant Orders    MR-LUMBAR SPINE-W/O    Numbness and tingling in left arm        Relevant Orders    MR-CERVICAL SPINE-W/O    Peripheral polyneuropathy (HCC)        Relevant Medications    pregabalin (LYRICA) 50 MG capsule    Peripheral polyneuropathy        Relevant Medications    pregabalin (LYRICA) 50 MG capsule          Interim History:  Zander Stewart 69 y.o. male presents today for f/u    He had EMG done. Reports that his symptoms are stable, no progression. He c/o significant dizziness with increase dose of gabapentin and would like to switch medication as he cannot tell exactly if the gabapentin helped with his symptoms.     He c/o of lower back pain and he was told that he may have pinch nerve. He admits to having occasional neck pain but not a concern to him.     He continues to smoke cigars.      Mood is good. No SI or HI        Past medical history:   Past Medical History:   Diagnosis Date   • Atrial fibrillation (HCC)    • Atrial flutter (HCC)    • Chronic anticoagulation    • Polycystic kidney, unspecified type    • Pure hypercholesterolemia    • Sleep apnea     on oxygen, unable to tolerate CPAP.   • Unspecified essential hypertension    • Unspecified sleep apnea        Past surgical history:   Past Surgical History:   Procedure Laterality Date   • APPENDECTOMY     • OTHER      Cardiac Cath   • OTHER      Kidney Surgery   • OTHER      Nose Surgery   • OTHER      Tonsillectomy with Adenoidectomy       Family history:   Family History   Problem Relation Age of Onset   • Heart Disease Neg Hx        Social history:   Social History     Socioeconomic History   • Marital status:      Spouse name: Not on file   • Number of children: Not on file   • Years of education:  Not on file   • Highest education level: Not on file   Occupational History   • Not on file   Social Needs   • Financial resource strain: Not on file   • Food insecurity:     Worry: Not on file     Inability: Not on file   • Transportation needs:     Medical: Not on file     Non-medical: Not on file   Tobacco Use   • Smoking status: Current Every Day Smoker     Types: Cigars   • Smokeless tobacco: Never Used   Substance and Sexual Activity   • Alcohol use: Yes     Comment: moderate   • Drug use: No   • Sexual activity: Not on file   Lifestyle   • Physical activity:     Days per week: Not on file     Minutes per session: Not on file   • Stress: Not on file   Relationships   • Social connections:     Talks on phone: Not on file     Gets together: Not on file     Attends Mandaeism service: Not on file     Active member of club or organization: Not on file     Attends meetings of clubs or organizations: Not on file     Relationship status: Not on file   • Intimate partner violence:     Fear of current or ex partner: Not on file     Emotionally abused: Not on file     Physically abused: Not on file     Forced sexual activity: Not on file   Other Topics Concern   • Not on file   Social History Narrative   • Not on file       Current medications:   Current Outpatient Medications   Medication   • gabapentin (NEURONTIN) 300 MG Cap   • losartan (COZAAR) 100 MG Tab   • DILTIAZem CD (CARDIZEM CD) 120 MG CAPSULE SR 24 HR   • spironolactone (ALDACTONE) 25 MG Tab   • VENTOLIN  (90 Base) MCG/ACT Aero Soln inhalation aerosol   • escitalopram (LEXAPRO) 20 MG tablet   • simvastatin (ZOCOR) 40 MG TABS   • furosemide (LASIX) 40 MG TABS   • omeprazole (PRILOSEC) 20 MG CPDR   • warfarin (COUMADIN) 5 MG TABS   • buPROPion SR (WELLBUTRIN-SR) 150 MG TB12   • allopurinol (ZYLOPRIM) 100 MG TABS   • zolpidem (AMBIEN) 10 MG TABS     No current facility-administered medications for this visit.        Medication Allergy:  No Known  "Allergies    Review of systems:     General: Denies fevers or chills, or nightsweats, or generalized fatigue.    Head: Denies headaches or dizziness or lightheadedness  EENT: Denies vision changes, vision loss or pain, nasal secretion, nasal bleeding, difficulty swallowing, hearing loss, tinnitus, vertigo, ear pain  Respiratory: Denies shortness of breath, cough, sputum, or wheezing  Cardiac: Denies chest pain, palpitations, edema or syncope  Gastrointestinal: Denies nausea, vomiting, no abdominal pain or change in bowel habits, no melena or hematochezia  Urinary: Denies dysuria, frequency, hesitancy, or incontinence.  Dermatologic:  Denies new rash  Musculoskeletal: Denies muscle pain or swelling, no atrophy, +neck and back pain   Neurologic: Denies facial droopiness, muscle weakness (focal or generalized), ataxia, change in speech or language, memory loss, abnormal movements, seizures, loss of consciousness, or episodes of confusion.   Psychiatric: Denies anxiety, depression, mood swings, suicidal or homicidal thoughts       Physical examination:   Vitals:    11/12/19 0756   BP: 132/68   BP Location: Right arm   Patient Position: Sitting   BP Cuff Size: Adult   Pulse: 100   Resp: 18   Temp: 36.1 °C (97 °F)   TempSrc: Temporal   SpO2: 94%   Weight: 107 kg (236 lb)   Height: 1.778 m (5' 10\")     General: Patient in no acute distress, pleasant and cooperative.  HEENT: Normocephalic, no signs of acute trauma.   moist conjunctivae. Nares are patent. Oropharynx clear without lesions and normal  hard and soft palates.   Neck: Supple. There is normal range of motion.   Resp: clear to auscultation bilaterally. No wheezes or crackles.   CV: RRR, no murmurs.   Skin: no signs of acute rashes or trauma.   Musculoskeletal: joints exhibit full range of motion, without any pain to palpation. There are no signs of joint or muscle swelling.   Psychiatric: No hallucinatory behavior. No symptoms of depression or suicidal ideation. " Mood and affect appear normal on exam.      NEUROLOGICAL EXAM:   Mental status, orientation: Awake, alert and fully oriented.   Speech and language: speech is clear and fluent. The patient is able to name, repeat and comprehend.   Memory: There is intact recollection of recent and remote events.   Cranial nerve exam:   CN I: Not examined   CN II: PERRL.   CN III, IV, VI: EOMI; no nystagmus   CN V: Facial sensation intact bilaterally   CN VII: face symmetric   CN VIII: hearing intact to finger rub bilaterally   CN IX, X: palate elevates symmetrically   CN XI: Symmetric shoulder shrug  CN XII: tongue midline. No signs of tongue biting or fasciculations   Motor exam: Strength is 5/5 in all extremities. Tone is normal. No abnormal movements were seen on exam.   Sensory exam reveals normal sense of light touch Decreased proprioception and vibration lower extremities.   Deep tendon reflexes: absent ankle jerks, 2+ throughout.    Coordination: shows a normal finger-nose-finger. Normal rapidly alternating movements.   Gait: The patient was able to get up from seated position on first attempt without requiring assistance. Found to be steady when walking. Movements were fluid with normal arm swing. The patient was able to turn without difficulties or tendency to fall. Unsteady tandem gait.  Romberg exam swaying    ANCILLARY DATA REVIEWED:     Lab Data Review:  Reviewed in chart.     Records reviewed:   Reviewed in chart.     Imaging:   EMG/NCS 10/24/2019  This is an abnormal electrodiagnostic study due to:  1. Unobtainable bilateral sural sensory responses which can be   seen in advanced age or secondary to early sensory peripheral   neuropathy.  2. Chronic inactive reinnervation of the bilateral tibialis   anterior which can be seen in bilateral L5 radiculopathy but in   itself is not diagnostic.  3. Chronic inactive reinnervation of the left triceps which can   be seen in old C6/7 radiculopathy but in itself is not    diagnostic.    There is no evidence of left median or ulnar neuropathy.    Recommend clinical correlation.  Consider repeat testing in 6 to   12 months if clinically indicated to evaluate for progression.        ASSESSMENT AND PLAN:    1. Peripheral polyneuropathy (HCC)    2. Numbness and tingling of both lower extremities  - MR-LUMBAR SPINE-W/O; Future    3. Numbness and tingling in left arm  - MR-CERVICAL SPINE-W/O; Future    4. Stabbing pain    5. Screening for depression    6. Anticonvulsant-induced dizziness        CLINICAL DISCUSSION:  Sx of paresthesia and intermittent stabbing pain that started in his toes now up to his calves maybe suggestive of a length dependent peripheral neuropathy. Etiology unknown. Left worse than right. No weakness. No autonomic sx. Pt has no hx of elevated blood sugar or exposure to toxin or heavy metals. EMG abnormal suggesting early sensory polyneuropathy and probable L5 radiculopathy. No evidence of ulnar or median neuropathy to explain his sx in the left arm (hx of CTS with surgical release), possibly cervical radiculopathy. Pt admits having chronic lower back pain without sciatica and intermittent neck pain. Pt had significant dizziness with increased dose of gabapentin and is not seeing benefit from it. He wants to try other medication.      No family hx of neuropathy. No autoimmune disorder.      Not drinking alcohol.     Pt aware that polyneuropathy is a progressive disease and there is no cure at this time. We can only mask the symptoms and attempt to identify cause to halt/ slow down the progression. Pt verbalized understanding.      Plan:  - Switch gabapentin to lyrica 50mg BID. Will wean off gabapentin. Instructions given: Take 1 tab only at night for 1 week then discontinue.     -cervical and lumbar MRI to r/o radiculopathy.       - discussed lifestyle modification including diet and exercise.     -discussed smoking cessation.     -Pt wants does not want any lab work  for now.                 FOLLOW-UP:   Return in about 3 months (around 2/12/2020).        EDUCATION AND COUNSELING:  -Discussed regular exercise program and prevention of cardiovascular disease, including stroke.   -Discussed healthy lifestyle, including: healthy diet (rich in fruits, vegetables, nuts and healthy oils); proper hydration, and adequate sleep hygiene (allowing 7-8 hrs of overnight sleep).    The patient understands and agrees that due to the complexity of his/her diagnosis, results of any testing and further recommendations will typically be discussed/made during a face to face encounter in my office. The patient and/or family further understands it is their responsibility to keep proper follow up.         Yuli Nance, MSN, APRN, FNP-C  [facility]  Office: [phone]  Fax: [phone]

## 2019-12-05 ENCOUNTER — OFFICE VISIT (OUTPATIENT)
Dept: CARDIOLOGY | Facility: MEDICAL CENTER | Age: 69
End: 2019-12-05
Payer: MEDICARE

## 2019-12-05 VITALS
WEIGHT: 231 LBS | OXYGEN SATURATION: 97 % | HEIGHT: 70 IN | BODY MASS INDEX: 33.07 KG/M2 | HEART RATE: 88 BPM | SYSTOLIC BLOOD PRESSURE: 148 MMHG | DIASTOLIC BLOOD PRESSURE: 88 MMHG

## 2019-12-05 DIAGNOSIS — I48.20 ATRIAL FIBRILLATION, CHRONIC (HCC): ICD-10-CM

## 2019-12-05 DIAGNOSIS — Z79.01 CHRONIC ANTICOAGULATION: Chronic | ICD-10-CM

## 2019-12-05 DIAGNOSIS — I10 ESSENTIAL HYPERTENSION: Chronic | ICD-10-CM

## 2019-12-05 DIAGNOSIS — E78.00 PURE HYPERCHOLESTEROLEMIA: Chronic | ICD-10-CM

## 2019-12-05 PROCEDURE — 99214 OFFICE O/P EST MOD 30 MIN: CPT | Performed by: INTERNAL MEDICINE

## 2019-12-05 RX ORDER — DILTIAZEM HYDROCHLORIDE 120 MG/1
120 CAPSULE, COATED, EXTENDED RELEASE ORAL DAILY
Qty: 90 CAP | Refills: 3 | Status: SHIPPED | OUTPATIENT
Start: 2019-12-05 | End: 2020-03-05 | Stop reason: SDUPTHER

## 2019-12-05 ASSESSMENT — ENCOUNTER SYMPTOMS
PALPITATIONS: 0
DIZZINESS: 0
MYALGIAS: 0
BRUISES/BLEEDS EASILY: 0
SHORTNESS OF BREATH: 0
LOSS OF CONSCIOUSNESS: 0
COUGH: 0

## 2019-12-05 NOTE — PROGRESS NOTES
Chief Complaint   Patient presents with   • HTN (Controlled)   • Atrial Fibrillation       Subjective:   Zander Stewart is a 69 y.o. male who presents today for follow-up evaluation of chronic atrial fibrillation, chronic anticoagulation, hypertension and hyperlipidemia in addition he has a history of obstructive sleep apnea with intolerance to CPAP therapy.     Last seen on 6/5/2019.    Since 6/20/2018 appointment the patient has had no cardiac problems or symptoms.  Has follow-up appointment with nephrologist next week.  Checks blood pressure 3 times weekly generally runs below 140 systolic.    Since 9/27/2018 the patient said no cardiac problems or symptoms.  He has noted a slightly productive cough and mild fever last night.  Has a follow-up appointment with PCP in the near future.    Since 5/1/2018 appointment the patient developed dizziness and balance problems with vertigo with increasing dose of losartan which he reduced back to his previous dose with resolution of his dizziness and balance problems.   No bleeding problems.  No other cardiac symptoms.  Remains active playing golf.  Just returned from a trip visiting his daughter in [place], [place].    Since 3/19/2018 the patient's lower extremity edema has resolved after reduction of Cardizem CD from 360 mg to 120 mg.  The patient has closely monitor his blood pressure fluctuates on average in the 140s to 150 range.  No other cardiac symptoms.     The patient was recently on 2/26/2018 by Dr. Scot Curry for uncontrolled hypertension resulting in cancellation of left knee arthroscopy.  Dr. Curry increased losartan from 50 mg daily to 100 mg daily.  The patient intermittently checks his blood pressure and notices it to be higher in the morning.  Recent BMP shows worsening renal function.  Complains of lower extremity edema which he attributes to Cardizem after researching it on the Internet.     Previously followed by Dr. Drake Westbrook, electrophysiologist  and Dr. Scot Hernández, retired cardiologist     Past Medical History:   Diagnosis Date   • Atrial fibrillation (HCC)    • Atrial flutter (HCC)    • Chronic anticoagulation    • Polycystic kidney, unspecified type    • Pure hypercholesterolemia    • Sleep apnea     on oxygen, unable to tolerate CPAP.   • Unspecified essential hypertension    • Unspecified sleep apnea      Past Surgical History:   Procedure Laterality Date   • APPENDECTOMY     • OTHER      Cardiac Cath   • OTHER      Kidney Surgery   • OTHER      Nose Surgery   • OTHER      Tonsillectomy with Adenoidectomy     Family History   Problem Relation Age of Onset   • Heart Disease Neg Hx      Social History     Socioeconomic History   • Marital status:      Spouse name: Not on file   • Number of children: Not on file   • Years of education: Not on file   • Highest education level: Not on file   Occupational History   • Not on file   Social Needs   • Financial resource strain: Not on file   • Food insecurity:     Worry: Not on file     Inability: Not on file   • Transportation needs:     Medical: Not on file     Non-medical: Not on file   Tobacco Use   • Smoking status: Current Every Day Smoker     Packs/day: 0.00     Types: Cigars   • Smokeless tobacco: Never Used   Substance and Sexual Activity   • Alcohol use: Yes     Comment: moderate   • Drug use: No   • Sexual activity: Not on file   Lifestyle   • Physical activity:     Days per week: Not on file     Minutes per session: Not on file   • Stress: Not on file   Relationships   • Social connections:     Talks on phone: Not on file     Gets together: Not on file     Attends [employer] service: Not on file     Active member of club or organization: Not on file     Attends meetings of clubs or organizations: Not on file     Relationship status: Not on file   • Intimate partner violence:     Fear of current or ex partner: Not on file     Emotionally abused: Not on file     Physically abused: Not on file     " Forced sexual activity: Not on file   Other Topics Concern   • Not on file   Social History Narrative   • Not on file     No Known Allergies  Outpatient Encounter Medications as of 12/5/2019   Medication Sig Dispense Refill   • DILTIAZem CD (CARDIZEM CD) 120 MG CAPSULE SR 24 HR Take 1 Cap by mouth every day. 90 Cap 3   • pregabalin (LYRICA) 50 MG capsule Take 1 Cap by mouth 2 times a day for 180 days. 60 Cap 5   • losartan (COZAAR) 100 MG Tab Take 1 Tab by mouth every day. 90 Tab 3   • spironolactone (ALDACTONE) 25 MG Tab Take 1 Tab by mouth every day. 90 Tab 3   • VENTOLIN  (90 Base) MCG/ACT Aero Soln inhalation aerosol Inhale 1-2 Puffs by mouth as needed.     • escitalopram (LEXAPRO) 20 MG tablet Take 10 mg by mouth every day.     • simvastatin (ZOCOR) 40 MG TABS Take 1 Tab by mouth every evening. 90 Tab 3   • furosemide (LASIX) 40 MG TABS Take 40 mg by mouth every day.     • omeprazole (PRILOSEC) 20 MG CPDR Take 20 mg by mouth 2 times a day.     • warfarin (COUMADIN) 5 MG TABS Take 5 mg by mouth every day. 5 mg 5 days a week and 2.5 mg two days     • buPROPion SR (WELLBUTRIN-SR) 150 MG TB12 Take 150 mg by mouth every 48 hours.     • allopurinol (ZYLOPRIM) 100 MG TABS Take 100 mg by mouth every day.     • zolpidem (AMBIEN) 10 MG TABS Take 10 mg by mouth at bedtime as needed.     • [DISCONTINUED] DILTIAZem CD (CARDIZEM CD) 120 MG CAPSULE SR 24 HR Take 1 Cap by mouth every day. 90 Cap 3     No facility-administered encounter medications on file as of 12/5/2019.      Review of Systems   Respiratory: Negative for cough and shortness of breath.    Cardiovascular: Negative for chest pain and palpitations.   Musculoskeletal: Negative for myalgias.   Neurological: Negative for dizziness and loss of consciousness.   Endo/Heme/Allergies: Does not bruise/bleed easily.        Objective:   /88 (BP Location: Left arm, Patient Position: Sitting, BP Cuff Size: Adult)   Pulse 88   Ht 1.778 m (5' 10\")   Wt 104.8 kg " (231 lb)   SpO2 97%   BMI 33.15 kg/m²     Physical Exam   Constitutional: He is oriented to person, place, and time. He appears well-developed and well-nourished.   Neck: No JVD present.   Cardiovascular: Normal rate, normal heart sounds and intact distal pulses. An irregularly irregular rhythm present.   No murmur heard.  Pulmonary/Chest: Effort normal and breath sounds normal. No respiratory distress. He has no wheezes. He has no rales.   Increased AP diameter.   Abdominal:   Markedly protuberant.   Musculoskeletal:         General: No edema.      Comments: Chronic dermatologic changes lower extremities.   Neurological: He is alert and oriented to person, place, and time.   Skin: Skin is warm and dry.   Psychiatric: He has a normal mood and affect. His behavior is normal.     ECHOCARDIOGRAM 06/06/2013  Left ejection fraction 55-60%.  Mild concentric left ventricular hypertrophy.  Mild mitral regurgitation.  Severe left atrial dilatation.  Moderate right atrial dilatation.     EKG 04/25/2013 atrial fibrillation, rate 83.    Assessment:     1. Atrial fibrillation, chronic     2. Chronic anticoagulation     3. Essential hypertension     4. Pure hypercholesterolemia  LIPID PANEL       Medical Decision Making:  Today's Assessment / Status / Plan:   Assessment  Atrial fibrillation. Chronic. Rate controlled. Asymptomatic.     Anticoagulation.  On warfarin.    Hypertension.  Currently controlled.       Hyperlipidemia. On simvastatin.     Chronic kidney disease.     Lower extremity edema.  Essentially resolved with reduction of Cardizem.    Recommendation and recommendation  1.  The patient's current cardiac status is stable.  2.  Continue current cardiac therapy.  3.  Reviewed lab results 12/20/2019 creatinine 1.8.  Potassium 3.8.  No lipid panel.  4.  Lipid panel.  5.  Renewed diltiazem.  6.  Follow-up 6 months.

## 2020-01-09 ENCOUNTER — HOSPITAL ENCOUNTER (OUTPATIENT)
Dept: RADIOLOGY | Facility: MEDICAL CENTER | Age: 70
End: 2020-01-09
Attending: NURSE PRACTITIONER
Payer: MEDICARE

## 2020-01-09 DIAGNOSIS — R20.0 NUMBNESS AND TINGLING OF BOTH LOWER EXTREMITIES: ICD-10-CM

## 2020-01-09 DIAGNOSIS — R20.2 NUMBNESS AND TINGLING IN LEFT ARM: ICD-10-CM

## 2020-01-09 DIAGNOSIS — R20.0 NUMBNESS AND TINGLING IN LEFT ARM: ICD-10-CM

## 2020-01-09 DIAGNOSIS — R20.2 NUMBNESS AND TINGLING OF BOTH LOWER EXTREMITIES: ICD-10-CM

## 2020-01-09 PROCEDURE — 72148 MRI LUMBAR SPINE W/O DYE: CPT

## 2020-01-09 PROCEDURE — 72141 MRI NECK SPINE W/O DYE: CPT

## 2020-01-29 DIAGNOSIS — I10 ESSENTIAL HYPERTENSION: ICD-10-CM

## 2020-01-30 RX ORDER — SPIRONOLACTONE 25 MG/1
25 TABLET ORAL DAILY
Qty: 90 TAB | Refills: 3 | Status: SHIPPED | OUTPATIENT
Start: 2020-01-30 | End: 2020-09-30

## 2020-02-06 ENCOUNTER — TELEPHONE (OUTPATIENT)
Dept: CARDIOLOGY | Facility: MEDICAL CENTER | Age: 70
End: 2020-02-06

## 2020-02-06 NOTE — TELEPHONE ENCOUNTER
SW pt would like LP lab order faxed to [facility] on [address].     Pls call pt to clarify if he should be fasting.

## 2020-02-07 ENCOUNTER — TELEPHONE (OUTPATIENT)
Dept: CARDIOLOGY | Facility: MEDICAL CENTER | Age: 70
End: 2020-02-07

## 2020-02-07 NOTE — TELEPHONE ENCOUNTER
Called and left a voicemail for patient on 2/07/2020 to remind him that he has an upcoming appointment with Genny More on 2/21/2020 at 3:40PM and he needs to do his labs before his next appointment.

## 2020-02-10 NOTE — TELEPHONE ENCOUNTER
Faxed lab orders to [facility] on [address] @ [phone]    Spoke w/ pt. Notified orders have been faxed and confirmed fasting is required for lab. Pt. Plans to complete by the beginning of next week.

## 2020-02-14 NOTE — PROGRESS NOTES
No chief complaint on file.      Problem List Items Addressed This Visit     None          Interim History:  Zander Stewart 69 y.o. male presents today for f/u     He had EMG done. Reports that his symptoms are stable, no progression. He c/o significant dizziness with increase dose of gabapentin and would like to switch medication as he cannot tell exactly if the gabapentin helped with his symptoms.      He c/o of lower back pain and he was told that he may have pinch nerve. He admits to having occasional neck pain but not a concern to him.      He continues to smoke cigars.       Mood is good. No SI or HI    Past medical history:   Past Medical History:   Diagnosis Date   • Atrial fibrillation (HCC)    • Atrial flutter (HCC)    • Chronic anticoagulation    • Polycystic kidney, unspecified type    • Pure hypercholesterolemia    • Sleep apnea     on oxygen, unable to tolerate CPAP.   • Unspecified essential hypertension    • Unspecified sleep apnea        Past surgical history:   Past Surgical History:   Procedure Laterality Date   • APPENDECTOMY     • OTHER      Cardiac Cath   • OTHER      Kidney Surgery   • OTHER      Nose Surgery   • OTHER      Tonsillectomy with Adenoidectomy       Family history:   Family History   Problem Relation Age of Onset   • Heart Disease Neg Hx        Social history:   Social History     Socioeconomic History   • Marital status:      Spouse name: Not on file   • Number of children: Not on file   • Years of education: Not on file   • Highest education level: Not on file   Occupational History   • Not on file   Social Needs   • Financial resource strain: Not on file   • Food insecurity     Worry: Not on file     Inability: Not on file   • Transportation needs     Medical: Not on file     Non-medical: Not on file   Tobacco Use   • Smoking status: Current Every Day Smoker     Packs/day: 0.00     Types: Cigars   • Smokeless tobacco: Never Used   Substance and Sexual Activity   •  Alcohol use: Yes     Comment: moderate   • Drug use: No   • Sexual activity: Not on file   Lifestyle   • Physical activity     Days per week: Not on file     Minutes per session: Not on file   • Stress: Not on file   Relationships   • Social connections     Talks on phone: Not on file     Gets together: Not on file     Attends [employer] service: Not on file     Active member of club or organization: Not on file     Attends meetings of clubs or organizations: Not on file     Relationship status: Not on file   • Intimate partner violence     Fear of current or ex partner: Not on file     Emotionally abused: Not on file     Physically abused: Not on file     Forced sexual activity: Not on file   Other Topics Concern   • Not on file   Social History Narrative   • Not on file       Current medications:   Current Outpatient Medications   Medication   • spironolactone (ALDACTONE) 25 MG Tab   • DILTIAZem CD (CARDIZEM CD) 120 MG CAPSULE SR 24 HR   • pregabalin (LYRICA) 50 MG capsule   • losartan (COZAAR) 100 MG Tab   • VENTOLIN  (90 Base) MCG/ACT Aero Soln inhalation aerosol   • escitalopram (LEXAPRO) 20 MG tablet   • simvastatin (ZOCOR) 40 MG TABS   • furosemide (LASIX) 40 MG TABS   • omeprazole (PRILOSEC) 20 MG CPDR   • warfarin (COUMADIN) 5 MG TABS   • buPROPion SR (WELLBUTRIN-SR) 150 MG TB12   • allopurinol (ZYLOPRIM) 100 MG TABS   • zolpidem (AMBIEN) 10 MG TABS     No current facility-administered medications for this visit.        Medication Allergy:  No Known Allergies    Review of systems:     General: Denies fevers or chills, or nightsweats, or generalized fatigue.    Head: Denies headaches or dizziness or lightheadedness  EENT: Denies vision changes, vision loss or pain, nasal secretion, nasal bleeding, difficulty swallowing, hearing loss, tinnitus, vertigo, ear pain  Endocdrinologic: Denies sweating, cold or heat intolerance. No polyuria or polydipsia.   Respiratory: Denies shortness of breath, cough,  sputum, or wheezing  Cardiac: Denies chest pain, palpitations, edema or syncope  Gastrointestinal: Denies nausea, vomiting, no abdominal pain or change in bowel habits, no melena or hematochezia  Urinary: Denies dysuria, frequency, hesitancy, or incontinence.  Dermatologic:  Denies new rash  Musculoskeletal: Denies muscle pain or swelling, no atrophy, no neck and back pain or stiffness.   Neurologic: Denies facial droopiness, muscle weakness (focal or generalized), paresthesias, ataxia, change in speech or language, memory loss, abnormal movements, seizures, loss of consciousness, or episodes of confusion.   Psychiatric: Denies anxiety, depression, mood swings, suicidal or homicidal thoughts       Physical examination:   There were no vitals filed for this visit.  General: Patient in no acute distress, pleasant and cooperative.  HEENT: Normocephalic, no signs of acute trauma.   moist conjunctivae. Nares are patent. Oropharynx clear without lesions and normal  hard and soft palates.   Neck: Supple. There is normal range of motion.   Resp: clear to auscultation bilaterally. No wheezes or crackles.   CV: RRR, no murmurs.   Skin: no signs of acute rashes or trauma.   Musculoskeletal: joints exhibit full range of motion, without any pain to palpation. There are no signs of joint or muscle swelling.   Psychiatric: No hallucinatory behavior. No symptoms of depression or suicidal ideation. Mood and affect appear normal on exam.      NEUROLOGICAL EXAM:   Mental status, orientation: Awake, alert and fully oriented.   Speech and language: speech is clear and fluent. The patient is able to name, repeat and comprehend.   Memory: There is intact recollection of recent and remote events.   Cranial nerve exam:   CN I: Not examined   CN II: PERRL.   CN III, IV, VI: EOMI; no nystagmus   CN V: Facial sensation intact bilaterally   CN VII: face symmetric   CN VIII: hearing intact to finger rub bilaterally   CN IX, X: palate elevates  symmetrically   CN XI: Symmetric shoulder shrug  CN XII: tongue midline. No signs of tongue biting or fasciculations   Motor exam: Strength is 5/5 in all extremities. Tone is normal. No abnormal movements were seen on exam.   Sensory exam reveals normal sense of light touch Decreased proprioception and vibration lower extremities.   Deep tendon reflexes: absent ankle jerks, 2+ throughout.    Coordination: shows a normal finger-nose-finger. Normal rapidly alternating movements.   Gait: The patient was able to get up from seated position on first attempt without requiring assistance. Found to be steady when walking. Movements were fluid with normal arm swing. The patient was able to turn without difficulties or tendency to fall. Unsteady tandem gait.  Romberg exam swaying    ANCILLARY DATA REVIEWED:     Lab Data Review:  Reviewed in chart. No results found for this or any previous visit (from the past 24 hour(s)).    Records reviewed:   Reviewed in chart.     Imaging:   EMG/NCS 10/24/2019  This is an abnormal electrodiagnostic study due to:  1. Unobtainable bilateral sural sensory responses which can be   seen in advanced age or secondary to early sensory peripheral   neuropathy.  2. Chronic inactive reinnervation of the bilateral tibialis   anterior which can be seen in bilateral L5 radiculopathy but in   itself is not diagnostic.  3. Chronic inactive reinnervation of the left triceps which can   be seen in old C6/7 radiculopathy but in itself is not   diagnostic.    There is no evidence of left median or ulnar neuropathy.    Recommend clinical correlation.  Consider repeat testing in 6 to   12 months if clinically indicated to evaluate for progression.     Cervical MRI 1/9/2020  Mild degenerative disc disease greater than facet arthropathy results in cord abutment at C5/6/7. No tl central stenosis  Borderline left C6-7 foraminal stenosis secondary to degenerative disc disease and uncovertebral  hypertrophy    Lumbar MRI 1/9/2020  Mild caudal facet arthropathy and degenerative disc disease results in mild foraminal L4/5/S1 stenoses  Right S1 nerve root abutment in the lumbosacral junction lateral recess secondary to a small protrusion      ASSESSMENT AND PLAN:    There are no diagnoses linked to this encounter.      CLINICAL DISCUSSION:  Sx of paresthesia and intermittent stabbing pain that started in his toes now up to his calves maybe suggestive of a length dependent peripheral neuropathy. Etiology unknown. Left worse than right. No weakness. No autonomic sx. Pt has no hx of elevated blood sugar or exposure to toxin or heavy metals. EMG abnormal suggesting early sensory polyneuropathy and probable L5 radiculopathy. No evidence of ulnar or median neuropathy to explain his sx in the left arm (hx of CTS with surgical release), possibly cervical radiculopathy. Pt admits having chronic lower back pain without sciatica and intermittent neck pain. Pt had significant dizziness with increased dose of gabapentin and is not seeing benefit from it. He wants to try other medication.      No family hx of neuropathy. No autoimmune disorder.      Not drinking alcohol.      Pt aware that polyneuropathy is a progressive disease and there is no cure at this time. We can only mask the symptoms and attempt to identify cause to halt/ slow down the progression. Pt verbalized understanding.      Plan:  - Switch gabapentin to lyrica 50mg BID. Will wean off gabapentin. Instructions given: Take 1 tab only at night for 1 week then discontinue.      -cervical and lumbar MRI to r/o radiculopathy.       - discussed lifestyle modification including diet and exercise.      -discussed smoking cessation.     -Pt wants does not want any lab work for now.             FOLLOW-UP:   No follow-ups on file.            EDUCATION AND COUNSELING:  -Discussed regular exercise program and prevention of cardiovascular disease, including stroke.    -Discussed healthy lifestyle, including: healthy diet (rich in fruits, vegetables, nuts and healthy oils); proper hydration, and adequate sleep hygiene (allowing 7-8 hrs of overnight sleep).    The patient understands and agrees that due to the complexity of his/her diagnosis, results of any testing and further recommendations will typically be discussed/made during a face to face encounter in my office. The patient and/or family further understands it is their responsibility to keep proper follow up.         Yuli Nance, MSN, APRN, FNP-C  [facility]  Office: [phone]  Fax: [phone]

## 2020-02-18 ENCOUNTER — APPOINTMENT (OUTPATIENT)
Dept: NEUROLOGY | Facility: MEDICAL CENTER | Age: 70
End: 2020-02-18
Payer: MEDICARE

## 2020-02-18 LAB
CHOLEST SERPL-MCNC: 176 MG/DL (ref 100–199)
HDLC SERPL-MCNC: 52 MG/DL
LABORATORY COMMENT REPORT: ABNORMAL
LDLC SERPL CALC-MCNC: 108 MG/DL (ref 0–99)
TRIGL SERPL-MCNC: 79 MG/DL (ref 0–149)
VLDLC SERPL CALC-MCNC: 16 MG/DL (ref 5–40)

## 2020-02-21 ENCOUNTER — APPOINTMENT (OUTPATIENT)
Dept: CARDIOLOGY | Facility: MEDICAL CENTER | Age: 70
End: 2020-02-21
Payer: MEDICARE

## 2020-03-05 ENCOUNTER — OFFICE VISIT (OUTPATIENT)
Dept: CARDIOLOGY | Facility: MEDICAL CENTER | Age: 70
End: 2020-03-05
Payer: MEDICARE

## 2020-03-05 VITALS
DIASTOLIC BLOOD PRESSURE: 108 MMHG | OXYGEN SATURATION: 93 % | WEIGHT: 231 LBS | HEART RATE: 110 BPM | HEIGHT: 70 IN | SYSTOLIC BLOOD PRESSURE: 165 MMHG | BODY MASS INDEX: 33.07 KG/M2

## 2020-03-05 DIAGNOSIS — I10 ESSENTIAL HYPERTENSION: Chronic | ICD-10-CM

## 2020-03-05 DIAGNOSIS — I48.20 ATRIAL FIBRILLATION, CHRONIC (HCC): ICD-10-CM

## 2020-03-05 DIAGNOSIS — Z79.01 CHRONIC ANTICOAGULATION: Chronic | ICD-10-CM

## 2020-03-05 PROCEDURE — 99214 OFFICE O/P EST MOD 30 MIN: CPT | Performed by: NURSE PRACTITIONER

## 2020-03-05 RX ORDER — AZITHROMYCIN 250 MG/1
TABLET, FILM COATED ORAL
COMMUNITY
Start: 2020-01-20 | End: 2020-03-05

## 2020-03-05 RX ORDER — ATORVASTATIN CALCIUM 20 MG/1
20 TABLET, FILM COATED ORAL EVERY EVENING
Qty: 30 TAB | Refills: 11 | Status: SHIPPED | OUTPATIENT
Start: 2020-03-05 | End: 2021-03-11

## 2020-03-05 RX ORDER — PREDNISONE 20 MG/1
TABLET ORAL
COMMUNITY
Start: 2019-12-16 | End: 2020-03-05

## 2020-03-05 RX ORDER — WARFARIN SODIUM 5 MG/1
5 TABLET ORAL DAILY
COMMUNITY
End: 2022-06-24

## 2020-03-05 RX ORDER — OMEPRAZOLE 40 MG/1
CAPSULE, DELAYED RELEASE ORAL
COMMUNITY
End: 2021-06-16

## 2020-03-05 RX ORDER — DILTIAZEM HYDROCHLORIDE 120 MG/1
120 CAPSULE, COATED, EXTENDED RELEASE ORAL 2 TIMES DAILY
Qty: 60 CAP | Refills: 11 | Status: SHIPPED | OUTPATIENT
Start: 2020-03-05 | End: 2021-03-11

## 2020-03-05 ASSESSMENT — ENCOUNTER SYMPTOMS
CHILLS: 0
HEMOPTYSIS: 0
NAUSEA: 0
COUGH: 0
WHEEZING: 0
HEADACHES: 0
VOMITING: 0
SPUTUM PRODUCTION: 0
FEVER: 0
DIZZINESS: 0
SHORTNESS OF BREATH: 0
PALPITATIONS: 0
PND: 0
CLAUDICATION: 0
ORTHOPNEA: 0

## 2020-03-05 NOTE — PROGRESS NOTES
Chief Complaint   Patient presents with   • Atrial Fibrillation       Subjective:   Maury Stewart is a 70 y.o. male who presents today for chronic atrial fibrillation, chronic anticoagulation with, hypertension and hyperlipidemia.  Patient also has HOWARD but intolerance to CPAP therapy.  This is a patient of Dr. Paul and was last seen 12/5/2019.    Patient comes in today for elevated blood pressure.  He reports that his blood pressure if it is good is 125-140/85-90 and if it is bad can be 200/100.  He is recently tried to have 2 different procedures which were canceled due to his blood pressure.  He is currently on diltiazem  mg daily, losartan 100 mg daily, and spironolactone 25 mg daily.  He preferrs not to increase spironolactone so that he can get things done in the daytime.  He has had diltiazem reduced to 120 mg daily due to side effects of dizziness and lower extremity edema.  Obviously his losartan is already at the highest dose possible.  He is amendable to trying diltiazem  mg twice daily and will split up his other blood pressure medications.    Past Medical History:   Diagnosis Date   • Atrial fibrillation (HCC)    • Atrial flutter (HCC)    • Chronic anticoagulation    • Polycystic kidney, unspecified type    • Pure hypercholesterolemia    • Sleep apnea     on oxygen, unable to tolerate CPAP.   • Unspecified essential hypertension    • Unspecified sleep apnea      Past Surgical History:   Procedure Laterality Date   • APPENDECTOMY     • OTHER      Cardiac Cath   • OTHER      Kidney Surgery   • OTHER      Nose Surgery   • OTHER      Tonsillectomy with Adenoidectomy     Family History   Problem Relation Age of Onset   • Heart Disease Neg Hx      Social History     Socioeconomic History   • Marital status:      Spouse name: Not on file   • Number of children: Not on file   • Years of education: Not on file   • Highest education level: Not on file   Occupational History   • Not  on file   Social Needs   • Financial resource strain: Not on file   • Food insecurity     Worry: Not on file     Inability: Not on file   • Transportation needs     Medical: Not on file     Non-medical: Not on file   Tobacco Use   • Smoking status: Current Every Day Smoker     Packs/day: 0.00     Types: Cigars   • Smokeless tobacco: Never Used   Substance and Sexual Activity   • Alcohol use: Yes     Comment: moderate   • Drug use: No   • Sexual activity: Not on file   Lifestyle   • Physical activity     Days per week: Not on file     Minutes per session: Not on file   • Stress: Not on file   Relationships   • Social connections     Talks on phone: Not on file     Gets together: Not on file     Attends [employer] service: Not on file     Active member of club or organization: Not on file     Attends meetings of clubs or organizations: Not on file     Relationship status: Not on file   • Intimate partner violence     Fear of current or ex partner: Not on file     Emotionally abused: Not on file     Physically abused: Not on file     Forced sexual activity: Not on file   Other Topics Concern   • Not on file   Social History Narrative   • Not on file     No Known Allergies  Outpatient Encounter Medications as of 3/5/2020   Medication Sig Dispense Refill   • azithromycin (ZITHROMAX) 250 MG Tab      • predniSONE (DELTASONE) 20 MG Tab      • omeprazole (PRILOSEC) 40 MG delayed-release capsule omeprazole 40 mg capsule,delayed release   Take 1 capsule every day by oral route.     • warfarin (COUMADIN) 5 MG Tab Coumadin 5 mg tablet   Take 1 tablet every day by oral route.     • spironolactone (ALDACTONE) 25 MG Tab Take 1 Tab by mouth every day. 90 Tab 3   • DILTIAZem CD (CARDIZEM CD) 120 MG CAPSULE SR 24 HR Take 1 Cap by mouth every day. 90 Cap 3   • pregabalin (LYRICA) 50 MG capsule Take 1 Cap by mouth 2 times a day for 180 days. 60 Cap 5   • losartan (COZAAR) 100 MG Tab Take 1 Tab by mouth every day. 90 Tab 3   • VENTOLIN  " (90 Base) MCG/ACT Aero Soln inhalation aerosol Inhale 1-2 Puffs by mouth as needed.     • escitalopram (LEXAPRO) 20 MG tablet Take 10 mg by mouth every day.     • simvastatin (ZOCOR) 40 MG TABS Take 1 Tab by mouth every evening. 90 Tab 3   • furosemide (LASIX) 40 MG TABS Take 40 mg by mouth every day.     • omeprazole (PRILOSEC) 20 MG CPDR Take 20 mg by mouth 2 times a day.     • warfarin (COUMADIN) 5 MG TABS Take 5 mg by mouth every day. 5 mg 5 days a week and 2.5 mg two days     • buPROPion SR (WELLBUTRIN-SR) 150 MG TB12 Take 150 mg by mouth every 48 hours.     • allopurinol (ZYLOPRIM) 100 MG TABS Take 100 mg by mouth every day.     • zolpidem (AMBIEN) 10 MG TABS Take 10 mg by mouth at bedtime as needed.       No facility-administered encounter medications on file as of 3/5/2020.      Review of Systems   Constitutional: Negative for chills and fever.   Respiratory: Negative for cough, hemoptysis, sputum production, shortness of breath and wheezing.    Cardiovascular: Negative for chest pain, palpitations, orthopnea, claudication, leg swelling and PND.   Gastrointestinal: Negative for nausea and vomiting.   Neurological: Negative for dizziness and headaches.   All other systems reviewed and are negative.       Objective:   Ht 1.778 m (5' 10\")   BMI 33.15 kg/m²     Physical Exam   Constitutional: He appears well-developed and well-nourished.   Eyes: EOM are normal.   Neck: Neck supple. No JVD present.   Cardiovascular: Normal rate, regular rhythm and normal heart sounds.   No murmur heard.  Pulmonary/Chest: Effort normal and breath sounds normal.   Abdominal: Soft.   Neurological:   CN II-XII grossly intact   Skin: Skin is warm and dry.   Psychiatric: He has a normal mood and affect. His behavior is normal. Judgment and thought content normal.   Nursing note and vitals reviewed.      Assessment:     1. Atrial fibrillation, chronic     2. Chronic anticoagulation     3. Essential hypertension         Medical " Decision Making:  Today's Assessment / Status / Plan:   1. Chronic A fib  -Increase diltiazem  mg BID and continue warfarin as directed    2.  Chronic anticoagulation  -Warfarin as directed    3.  HTN  -Increase diltiazem  mg twice daily    Collaborating MD is Dr. Calderón.  Follow-up visit already scheduled for July with Kaitlynn.    Please note that this dictation was created using voice recognition software.  I have made every reasonable attempt to correct obvious errors, but it is possible there are errors of grammar or possibly content that I did not discover before finalizing the note.        Written instructions:  1. Spironolactone in AM  2. Losartan in PM  3. Diltiazem in AM and PM

## 2020-06-02 ENCOUNTER — TELEPHONE (OUTPATIENT)
Dept: HEALTH INFORMATION MANAGEMENT | Facility: OTHER | Age: 70
End: 2020-06-02

## 2020-06-02 ENCOUNTER — TELEPHONE (OUTPATIENT)
Dept: CARDIOLOGY | Facility: MEDICAL CENTER | Age: 70
End: 2020-06-02

## 2020-06-02 NOTE — TELEPHONE ENCOUNTER
DB    Hello, patient would like lab slips mailed to him before his appt with SW he will like a call back at [phone]

## 2020-06-02 NOTE — TELEPHONE ENCOUNTER
1. Caller Name: Maury Stewart                 Call Back Number: [phone]  [facility] PCP or Specialty Provider: Yes Terri DIOP        2.  Does patient have any new or worsening symptoms of respiratory illness? Yes, the patient reports the following respiratory symptoms: shortness of breath or difficulty breathing.  Patient has mild SOB which he attributes to minor allergies/someting in the air.  He also just mowed his lawn.  3.  Does patient have any comoribidities? None     4.  Has the patient traveled in the last 14 days OR had any known contact with someone who is suspected or confirmed to have COVID-19?  No.    5. Disposition: Advised to perform self care, monitor for worsening symptoms and to call back in 3 days if no improvement Patient had called scheduling to check on the date of his next appt with cardiologist which is July 7.  He mentioned to  his SOB and he was transferred to Triage.  He will likely need to be re-Triaged closer to his July appt. As he is so far out today.    Note routed to [facility] Provider: FYI only.

## 2020-08-19 ENCOUNTER — HOSPITAL ENCOUNTER (OUTPATIENT)
Dept: RADIOLOGY | Facility: MEDICAL CENTER | Age: 70
End: 2020-08-19
Attending: INTERNAL MEDICINE
Payer: MEDICARE

## 2020-08-19 ENCOUNTER — OFFICE VISIT (OUTPATIENT)
Dept: CARDIOLOGY | Facility: MEDICAL CENTER | Age: 70
End: 2020-08-19
Payer: MEDICARE

## 2020-08-19 VITALS
OXYGEN SATURATION: 94 % | HEIGHT: 70 IN | HEART RATE: 74 BPM | DIASTOLIC BLOOD PRESSURE: 78 MMHG | BODY MASS INDEX: 33.64 KG/M2 | SYSTOLIC BLOOD PRESSURE: 124 MMHG | WEIGHT: 235 LBS

## 2020-08-19 DIAGNOSIS — I10 ESSENTIAL HYPERTENSION: Chronic | ICD-10-CM

## 2020-08-19 DIAGNOSIS — E78.00 PURE HYPERCHOLESTEROLEMIA: Chronic | ICD-10-CM

## 2020-08-19 DIAGNOSIS — R06.02 SOB (SHORTNESS OF BREATH) ON EXERTION: ICD-10-CM

## 2020-08-19 DIAGNOSIS — Z79.01 CHRONIC ANTICOAGULATION: Chronic | ICD-10-CM

## 2020-08-19 DIAGNOSIS — I48.20 ATRIAL FIBRILLATION, CHRONIC (HCC): ICD-10-CM

## 2020-08-19 LAB — EKG IMPRESSION: NORMAL

## 2020-08-19 PROCEDURE — 71046 X-RAY EXAM CHEST 2 VIEWS: CPT

## 2020-08-19 PROCEDURE — 99215 OFFICE O/P EST HI 40 MIN: CPT | Performed by: INTERNAL MEDICINE

## 2020-08-19 PROCEDURE — 93000 ELECTROCARDIOGRAM COMPLETE: CPT | Performed by: INTERNAL MEDICINE

## 2020-08-19 ASSESSMENT — ENCOUNTER SYMPTOMS
DIZZINESS: 0
LOSS OF CONSCIOUSNESS: 0
MYALGIAS: 0
COUGH: 0
SHORTNESS OF BREATH: 0
BRUISES/BLEEDS EASILY: 0
PALPITATIONS: 0

## 2020-08-19 NOTE — LETTER
[facility]   [address] [place] [address] [address]  [address] Chen [address]  Phone: [phone]  Fax: [phone]              Zander Stewart  1950    Encounter Date: 8/19/2020    Eugene Calderón M.D.          PROGRESS NOTE:  Chief Complaint   Patient presents with   • Atrial Fibrillation       Subjective:   Zander Stewart is a 69 y.o. male who presents today for follow-up evaluation of chronic atrial fibrillation, chronic anticoagulation, hypertension and hyperlipidemia in addition he has a history of obstructive sleep apnea with intolerance to CPAP therapy.     Last seen on 3/5/2020 by ALIDA Barboza.    Since    Since 6/20/2018 appointment the patient has had no cardiac problems or symptoms.  Has follow-up appointment with nephrologist next week.  Checks blood pressure 3 times weekly generally runs below 140 systolic.    Since 9/27/2018 the patient said no cardiac problems or symptoms.  He has noted a slightly productive cough and mild fever last night.  Has a follow-up appointment with PCP in the near future.    Since 5/1/2018 appointment the patient developed dizziness and balance problems with vertigo with increasing dose of losartan which he reduced back to his previous dose with resolution of his dizziness and balance problems.   No bleeding problems.  No other cardiac symptoms.  Remains active playing golf.  Just returned from a trip visiting his daughter in [place], [place].    Since 3/19/2018 the patient's lower extremity edema has resolved after reduction of Cardizem CD from 360 mg to 120 mg.  The patient has closely monitor his blood pressure fluctuates on average in the 140s to 150 range.  No other cardiac symptoms.     The patient was recently on 2/26/2018 by Dr. Scot Curry for uncontrolled hypertension resulting in cancellation of left knee arthroscopy.  Dr. Curry increased losartan from 50 mg daily to 100 mg daily.  The patient intermittently  checks his blood pressure and notices it to be higher in the morning.  Recent BMP shows worsening renal function.  Complains of lower extremity edema which he attributes to Cardizem after researching it on the Internet.     Previously followed by Dr. Drake Westbrook, electrophysiologist and Dr. Scot Hernández, retired cardiologist     Past Medical History:   Diagnosis Date   • Atrial fibrillation (HCC)    • Atrial flutter (HCC)    • Chronic anticoagulation    • Polycystic kidney, unspecified type    • Pure hypercholesterolemia    • Sleep apnea     on oxygen, unable to tolerate CPAP.   • Unspecified essential hypertension    • Unspecified sleep apnea      Past Surgical History:   Procedure Laterality Date   • APPENDECTOMY     • OTHER      Cardiac Cath   • OTHER      Kidney Surgery   • OTHER      Nose Surgery   • OTHER      Tonsillectomy with Adenoidectomy     Family History   Problem Relation Age of Onset   • Heart Disease Neg Hx      Social History     Socioeconomic History   • Marital status:      Spouse name: Not on file   • Number of children: Not on file   • Years of education: Not on file   • Highest education level: Not on file   Occupational History   • Not on file   Social Needs   • Financial resource strain: Not on file   • Food insecurity     Worry: Not on file     Inability: Not on file   • Transportation needs     Medical: Not on file     Non-medical: Not on file   Tobacco Use   • Smoking status: Current Every Day Smoker     Packs/day: 0.00     Types: Cigars   • Smokeless tobacco: Never Used   Substance and Sexual Activity   • Alcohol use: Yes     Comment: moderate   • Drug use: No   • Sexual activity: Not on file   Lifestyle   • Physical activity     Days per week: Not on file     Minutes per session: Not on file   • Stress: Not on file   Relationships   • Social connections     Talks on phone: Not on file     Gets together: Not on file     Attends Shinto service: Not on file     Active member of  club or organization: Not on file     Attends meetings of clubs or organizations: Not on file     Relationship status: Not on file   • Intimate partner violence     Fear of current or ex partner: Not on file     Emotionally abused: Not on file     Physically abused: Not on file     Forced sexual activity: Not on file   Other Topics Concern   • Not on file   Social History Narrative   • Not on file     No Known Allergies  Outpatient Encounter Medications as of 8/19/2020   Medication Sig Dispense Refill   • warfarin (COUMADIN) 5 MG Tab Coumadin 5 mg tablet   Take 1 tablet every day by oral route.     • atorvastatin (LIPITOR) 20 MG Tab Take 1 Tab by mouth every evening. 30 Tab 11   • DILTIAZem CD (CARDIZEM CD) 120 MG CAPSULE SR 24 HR Take 1 Cap by mouth 2 Times a Day. 60 Cap 11   • spironolactone (ALDACTONE) 25 MG Tab Take 1 Tab by mouth every day. 90 Tab 3   • losartan (COZAAR) 100 MG Tab Take 1 Tab by mouth every day. 90 Tab 3   • VENTOLIN  (90 Base) MCG/ACT Aero Soln inhalation aerosol Inhale 1-2 Puffs by mouth as needed.     • escitalopram (LEXAPRO) 20 MG tablet Take 10 mg by mouth every day.     • furosemide (LASIX) 40 MG TABS Take 40 mg by mouth every day.     • omeprazole (PRILOSEC) 20 MG CPDR Take 20 mg by mouth 2 times a day.     • buPROPion SR (WELLBUTRIN-SR) 150 MG TB12 Take 150 mg by mouth every 48 hours.     • zolpidem (AMBIEN) 10 MG TABS Take 10 mg by mouth at bedtime as needed.     • omeprazole (PRILOSEC) 40 MG delayed-release capsule omeprazole 40 mg capsule,delayed release   Take 1 capsule every day by oral route.       No facility-administered encounter medications on file as of 8/19/2020.      Review of Systems   Respiratory: Negative for cough and shortness of breath.    Cardiovascular: Negative for chest pain and palpitations.   Musculoskeletal: Negative for myalgias.   Neurological: Negative for dizziness and loss of consciousness.   Endo/Heme/Allergies: Does not bruise/bleed easily.         "Objective:   /78 (BP Location: Left arm, Patient Position: Sitting, BP Cuff Size: Adult)   Pulse 74   Ht 1.778 m (5' 10\")   Wt 106.6 kg (235 lb)   SpO2 94%   BMI 33.72 kg/m²     Physical Exam   Constitutional: He is oriented to person, place, and time. He appears well-developed and well-nourished.   Neck: No JVD present.   Cardiovascular: Normal rate, normal heart sounds and intact distal pulses. An irregularly irregular rhythm present.   No murmur heard.  Pulmonary/Chest: Effort normal and breath sounds normal. No respiratory distress. He has no wheezes. He has no rales.   Increased AP diameter.   Abdominal:   Markedly protuberant.   Musculoskeletal:         General: No edema.      Comments: Chronic dermatologic changes lower extremities.   Neurological: He is alert and oriented to person, place, and time.   Skin: Skin is warm and dry.   Psychiatric: He has a normal mood and affect. His behavior is normal.     ECHOCARDIOGRAM 06/06/2013  Left ejection fraction 55-60%.  Mild concentric left ventricular hypertrophy.  Mild mitral regurgitation.  Severe left atrial dilatation.  Moderate right atrial dilatation.     EKG 04/25/2013 atrial fibrillation, rate 83.    Assessment:     1. Atrial fibrillation, chronic (HCC)     2. Chronic anticoagulation     3. Essential hypertension     4. Pure hypercholesterolemia         Medical Decision Making:  Today's Assessment / Status / Plan:   Assessment  Atrial fibrillation. Chronic. Rate controlled. Asymptomatic.     Anticoagulation.  On warfarin.    Hypertension.  Currently controlled.       Hyperlipidemia. On simvastatin.     Chronic kidney disease.     Lower extremity edema.  Essentially resolved with reduction of Cardizem.    Recommendation and recommendation  1.  The patient's current cardiac status is stable.  2.  Continue current cardiac therapy.  3.  Reviewed lab results 12/20/2019 creatinine 1.8.  Potassium 3.8.  No lipid panel.  4.  Lipid panel.  5.  Renewed " diltiazem.  6.  Follow-up 6 months.         Kat Vinson M.D.  [address] [place] [address] Dr [address]ADMINI MICHELE [id number]  Via Fax: [phone]

## 2020-08-19 NOTE — PROGRESS NOTES
"Chief Complaint   Patient presents with   • Atrial Fibrillation       Subjective:   Zander Stewart is a 70 y.o. male who presents today for follow-up evaluation of chronic atrial fibrillation, chronic anticoagulation, hypertension and hyperlipidemia in addition he has a history of obstructive sleep apnea with intolerance to CPAP therapy.     Last seen on 3/5/2020 by ALIDA Barboza.    Since 3/5/2020 the patient has developed increasing lower extremity edema, exertional shortness of breath walking up a grade while playing golf, increasing abdominal distention in addition to urinary frequency not completely eliminating his bladder each time.  No history of prostate problems.  Saw Dr. Vinson, nephrologist 3 weeks ago but mention not of this to her.  No chest discomfort.  Drinks \"a lot of water\".  Last appointment diltiazem increased due to severe labile hypertension which resulted in cancellation of at least 2 procedures.  Recent creatinine 8/13 2.11.    Since 6/20/2018 appointment the patient has had no cardiac problems or symptoms.  Has follow-up appointment with nephrologist next week.  Checks blood pressure 3 times weekly generally runs below 140 systolic.    Since 9/27/2018 the patient said no cardiac problems or symptoms.  He has noted a slightly productive cough and mild fever last night.  Has a follow-up appointment with PCP in the near future.    Since 5/1/2018 appointment the patient developed dizziness and balance problems with vertigo with increasing dose of losartan which he reduced back to his previous dose with resolution of his dizziness and balance problems.   No bleeding problems.  No other cardiac symptoms.  Remains active playing golf.  Just returned from a trip visiting his daughter in [place], [place].    Since 3/19/2018 the patient's lower extremity edema has resolved after reduction of Cardizem CD from 360 mg to 120 mg.  The patient has closely monitor his blood pressure fluctuates " on average in the 140s to 150 range.  No other cardiac symptoms.     The patient was recently on 2/26/2018 by Dr. Scot Curry for uncontrolled hypertension resulting in cancellation of left knee arthroscopy.  Dr. Curry increased losartan from 50 mg daily to 100 mg daily.  The patient intermittently checks his blood pressure and notices it to be higher in the morning.  Recent BMP shows worsening renal function.  Complains of lower extremity edema which he attributes to Cardizem after researching it on the Internet.     Previously followed by Dr. Drake Westbrook, electrophysiologist and Dr. Scot Hernández, retired cardiologist     Past Medical History:   Diagnosis Date   • Atrial fibrillation (HCC)    • Atrial flutter (HCC)    • Chronic anticoagulation    • Polycystic kidney, unspecified type    • Pure hypercholesterolemia    • Sleep apnea     on oxygen, unable to tolerate CPAP.   • Unspecified essential hypertension    • Unspecified sleep apnea      Past Surgical History:   Procedure Laterality Date   • APPENDECTOMY     • OTHER      Cardiac Cath   • OTHER      Kidney Surgery   • OTHER      Nose Surgery   • OTHER      Tonsillectomy with Adenoidectomy     Family History   Problem Relation Age of Onset   • Heart Disease Neg Hx      Social History     Socioeconomic History   • Marital status:      Spouse name: Not on file   • Number of children: Not on file   • Years of education: Not on file   • Highest education level: Not on file   Occupational History   • Not on file   Social Needs   • Financial resource strain: Not on file   • Food insecurity     Worry: Not on file     Inability: Not on file   • Transportation needs     Medical: Not on file     Non-medical: Not on file   Tobacco Use   • Smoking status: Current Every Day Smoker     Packs/day: 0.00     Types: Cigars   • Smokeless tobacco: Never Used   Substance and Sexual Activity   • Alcohol use: Yes     Comment: moderate   • Drug use: No   • Sexual activity: Not on file    Lifestyle   • Physical activity     Days per week: Not on file     Minutes per session: Not on file   • Stress: Not on file   Relationships   • Social connections     Talks on phone: Not on file     Gets together: Not on file     Attends Yarsanism service: Not on file     Active member of club or organization: Not on file     Attends meetings of clubs or organizations: Not on file     Relationship status: Not on file   • Intimate partner violence     Fear of current or ex partner: Not on file     Emotionally abused: Not on file     Physically abused: Not on file     Forced sexual activity: Not on file   Other Topics Concern   • Not on file   Social History Narrative   • Not on file     No Known Allergies  Outpatient Encounter Medications as of 8/19/2020   Medication Sig Dispense Refill   • warfarin (COUMADIN) 5 MG Tab Coumadin 5 mg tablet   Take 1 tablet every day by oral route.     • atorvastatin (LIPITOR) 20 MG Tab Take 1 Tab by mouth every evening. 30 Tab 11   • DILTIAZem CD (CARDIZEM CD) 120 MG CAPSULE SR 24 HR Take 1 Cap by mouth 2 Times a Day. 60 Cap 11   • spironolactone (ALDACTONE) 25 MG Tab Take 1 Tab by mouth every day. 90 Tab 3   • losartan (COZAAR) 100 MG Tab Take 1 Tab by mouth every day. 90 Tab 3   • VENTOLIN  (90 Base) MCG/ACT Aero Soln inhalation aerosol Inhale 1-2 Puffs by mouth as needed.     • escitalopram (LEXAPRO) 20 MG tablet Take 10 mg by mouth every day.     • furosemide (LASIX) 40 MG TABS Take 40 mg by mouth every day.     • omeprazole (PRILOSEC) 20 MG CPDR Take 20 mg by mouth 2 times a day.     • buPROPion SR (WELLBUTRIN-SR) 150 MG TB12 Take 150 mg by mouth every 48 hours.     • zolpidem (AMBIEN) 10 MG TABS Take 10 mg by mouth at bedtime as needed.     • omeprazole (PRILOSEC) 40 MG delayed-release capsule omeprazole 40 mg capsule,delayed release   Take 1 capsule every day by oral route.       No facility-administered encounter medications on file as of 8/19/2020.      Review of  "Systems   Respiratory: Negative for cough and shortness of breath.    Cardiovascular: Negative for chest pain and palpitations.   Musculoskeletal: Negative for myalgias.   Neurological: Negative for dizziness and loss of consciousness.   Endo/Heme/Allergies: Does not bruise/bleed easily.        Objective:   /78 (BP Location: Left arm, Patient Position: Sitting, BP Cuff Size: Adult)   Pulse 74   Ht 1.778 m (5' 10\")   Wt 106.6 kg (235 lb)   SpO2 94%   BMI 33.72 kg/m²     Physical Exam   Constitutional: He is oriented to person, place, and time. He appears well-developed and well-nourished. No distress.   Eyes: Pupils are equal, round, and reactive to light. Conjunctivae and EOM are normal.   Neck: Normal range of motion. Neck supple. JVD present.   Elevated JVP at 40 degrees.   Cardiovascular: Normal rate, normal heart sounds and intact distal pulses. An irregularly irregular rhythm present. Exam reveals no gallop and no friction rub.   No murmur heard.  Pulses:       Carotid pulses are 2+ on the right side and 2+ on the left side.  Pulmonary/Chest: Effort normal and breath sounds normal. No accessory muscle usage. No respiratory distress. He has no wheezes. He has no rales.   Increased AP diameter.   Abdominal: Soft. He exhibits no distension and no mass. There is no hepatosplenomegaly. There is no abdominal tenderness.   Markedly protuberant possible ascites.   Musculoskeletal:         General: Edema present.      Comments: Chronic dermatologic changes lower extremities.   Neurological: He is alert and oriented to person, place, and time.   Skin: Skin is warm, dry and intact. No rash noted. No cyanosis. Nails show no clubbing.   Psychiatric: He has a normal mood and affect. His behavior is normal.   Vitals reviewed.    ECHOCARDIOGRAM 06/06/2013  Left ejection fraction 55-60%.  Mild concentric left ventricular hypertrophy.  Mild mitral regurgitation.  Severe left atrial dilatation.  Moderate right atrial " dilatation.     EKG 04/25/2013 atrial fibrillation, rate 83.    Assessment:     1. Atrial fibrillation, chronic (HCC)  EKG    EC-ECHOCARDIOGRAM COMPLETE W/O CONT   2. Chronic anticoagulation     3. Essential hypertension     4. Pure hypercholesterolemia     5. SOB (shortness of breath) on exertion  EC-ECHOCARDIOGRAM COMPLETE W/O CONT    DX-CHEST-2 VIEWS       Medical Decision Making:  Today's Assessment / Status / Plan:   Assessment  Volume overload possibly multifactorial    Atrial fibrillation. Chronic. Rate controlled.     Anticoagulation.  On warfarin.    Hypertension.       Hyperlipidemia. On simvastatin.     Chronic kidney disease.     Lower extremity edema.  Essentially resolved with reduction of Cardizem.    Recommendation and recommendation  1.  Currently the patient is volume overloaded etiology include worsening renal function creatinine now over 2, excessive fluid intake superimposed on CKD with need to rule out LV dysfunction.  2.  Instructed to decrease water intake.  3.  Increase Lasix to 80 mg x 1 dose to assess response.  4.  Daily weight.  5.  Echocardiogram.  6.  CXR.  7.  EKG shows atrial fibrillation rate controlled 80 bpm unchanged since 2013.

## 2020-08-20 ENCOUNTER — TELEPHONE (OUTPATIENT)
Dept: CARDIOLOGY | Facility: MEDICAL CENTER | Age: 70
End: 2020-08-20

## 2020-08-20 NOTE — TELEPHONE ENCOUNTER
SW    Hello, patient calling in regards to his chest ray results. He will like a call back at [phone].      Thank you!

## 2020-08-21 NOTE — TELEPHONE ENCOUNTER
Contacted pt and informed him of CXR result interpretation showing no acute cardiac or pulmonary processes with no fluid accumulation in the lungs. Pt reports feels better since decreasing water intake and taking the extra dose of lasix yesterday per Dr. Calderón. Reviewed MD instructions and discussed monitoring daily weights. Pt was advised to call if he gains 3lbs overnight or 5lbs in one week. Pt will proceed with echo and follow up with APRN as scheduled.

## 2020-09-02 ENCOUNTER — HOSPITAL ENCOUNTER (OUTPATIENT)
Dept: CARDIOLOGY | Facility: MEDICAL CENTER | Age: 70
End: 2020-09-02
Attending: INTERNAL MEDICINE
Payer: MEDICARE

## 2020-09-02 DIAGNOSIS — I48.20 ATRIAL FIBRILLATION, CHRONIC (HCC): ICD-10-CM

## 2020-09-02 DIAGNOSIS — R06.02 SOB (SHORTNESS OF BREATH) ON EXERTION: ICD-10-CM

## 2020-09-02 PROCEDURE — 93306 TTE W/DOPPLER COMPLETE: CPT

## 2020-09-02 PROCEDURE — 93306 TTE W/DOPPLER COMPLETE: CPT | Mod: 26 | Performed by: INTERNAL MEDICINE

## 2020-09-04 ENCOUNTER — TELEPHONE (OUTPATIENT)
Dept: CARDIOLOGY | Facility: MEDICAL CENTER | Age: 70
End: 2020-09-04

## 2020-09-04 NOTE — TELEPHONE ENCOUNTER
Spoke to patient and discussed results and recommendations. Patient verbalized understanding and was appreciative for the return phone call.

## 2020-09-04 NOTE — TELEPHONE ENCOUNTER
SW    Patient would like a call back regarding their Echo results. They can be reached at [phone].    Thank you,  Erika ORNELAS

## 2020-09-04 NOTE — TELEPHONE ENCOUNTER
"Per Dr. Calderón, heart is a \"little weaker\" from last echo, most likely from uncontrolled hypertension secondary to kidney issues. No concerns or danger at this time. Encourage patient to keep upcoming appointment.    Attempted to reach patient. Patient identifier on recording. Left message requesting a return phone call. Advised that phone lines are closed during lunch, and we'll be closed for the 3-day weekend beginning at 5pm.  "

## 2020-09-10 ENCOUNTER — TELEPHONE (OUTPATIENT)
Dept: CARDIOLOGY | Facility: MEDICAL CENTER | Age: 70
End: 2020-09-10

## 2020-09-10 DIAGNOSIS — I50.20 ACC/AHA STAGE C HEART FAILURE WITH REDUCED EJECTION FRACTION (HCC): ICD-10-CM

## 2020-09-10 NOTE — TELEPHONE ENCOUNTER
Labs entered in chart by Dr. Calderón.    Attempted to reach patient to advise on lab orders. No answer. Patient identifier on recording. Left detailed voicemail about requested lab work prior to next appointment.    Copy of lab orders mailed to patient address in the chart.

## 2020-09-11 NOTE — TELEPHONE ENCOUNTER
Returned call. Pt wanted to know what labs he is having done. Informed him that it is a BNP and BMP. He will wait for the lab slips in the mail and complete at [facility] prior to his appt.

## 2020-09-17 ENCOUNTER — TELEPHONE (OUTPATIENT)
Dept: CARDIOLOGY | Facility: MEDICAL CENTER | Age: 70
End: 2020-09-17

## 2020-09-17 DIAGNOSIS — Z79.899 ON POTASSIUM WASTING DIURETIC THERAPY: ICD-10-CM

## 2020-09-17 NOTE — TELEPHONE ENCOUNTER
SW    Patient has some questions regarding their medication. Please call the PT back at [phone]    Thank you,  Erika ORNELAS

## 2020-09-18 RX ORDER — FUROSEMIDE 80 MG
80 TABLET ORAL DAILY
Qty: 90 TAB | Refills: 0 | Status: SHIPPED | OUTPATIENT
Start: 2020-09-18 | End: 2021-03-30

## 2020-09-18 NOTE — TELEPHONE ENCOUNTER
"Spoke to patient Patient reports Dr. Calderón instructed him to \"double up\" on his furosemide 40mg and is running out of tablets.    Last office note states:        Advised patient that Dr. Calderón will be consulted for clarification on whether the 80mg was for one dose only. Patient was under the impression it was 80mg daily.    SW is ADR today. Will consult with him when he arrives to the clinic.  ---------------------------------------------------------------------------------------------------------------------------------------  Per Dr. Calderón, if patient feels 80mg has helped his symptoms, continue with dosage. Order repeat BMP and keep upcoming appointment with DB on 9/30/20.    Spoke to patient. Patient reports feeling much better on current dosage of 80mg daily. Patient will have labs completed on Tuesday, 9/22/20. New Rx sent to pharmacy.     Patient verbalized understanding of recommendation and was appreciative for the response.  "

## 2020-09-23 ENCOUNTER — TELEPHONE (OUTPATIENT)
Dept: CARDIOLOGY | Facility: MEDICAL CENTER | Age: 70
End: 2020-09-23

## 2020-09-23 NOTE — TELEPHONE ENCOUNTER
Chart prep    Obtained BMP from [employer], no record of [employer] Natriuretic Peptide, NT   BMP done 9/22/2020, results sent to scanning.

## 2020-09-24 LAB
BUN SERPL-MCNC: 35 MG/DL (ref 8–27)
BUN/CREAT SERPL: 13 (ref 10–24)
CALCIUM SERPL-MCNC: 9.7 MG/DL (ref 8.6–10.2)
CHLORIDE SERPL-SCNC: 104 MMOL/L (ref 96–106)
CO2 SERPL-SCNC: 22 MMOL/L (ref 20–29)
CREAT SERPL-MCNC: 2.62 MG/DL (ref 0.76–1.27)
GLUCOSE SERPL-MCNC: 91 MG/DL (ref 65–99)
NT-PROBNP SERPL-MCNC: 6113 PG/ML (ref 0–376)
POTASSIUM SERPL-SCNC: 4.3 MMOL/L (ref 3.5–5.2)
SODIUM SERPL-SCNC: 141 MMOL/L (ref 134–144)

## 2020-09-25 ENCOUNTER — TELEPHONE (OUTPATIENT)
Dept: CARDIOLOGY | Facility: MEDICAL CENTER | Age: 70
End: 2020-09-25

## 2020-09-25 NOTE — TELEPHONE ENCOUNTER
Pt returned call. Informed him of recommendations, he will reach out to his nephrologist and stop spironolactone.       To ROMANA and ABRAHAM GRUBBS

## 2020-09-25 NOTE — TELEPHONE ENCOUNTER
Eva Oliva M.D.  Celsa Jimenez R.N.; Eugene Calderón M.D.             Instruct patient to stop taking spironolactone due to risk of hyperkalemia and please refer to nephrology.  Make patient aware of the abnormal blood test.  Dr. Calderón can advise further next week.     Thank you.          LVM asking pt to call back.

## 2020-09-26 DIAGNOSIS — I50.20 ACC/AHA STAGE C HEART FAILURE WITH REDUCED EJECTION FRACTION (HCC): ICD-10-CM

## 2020-09-30 ENCOUNTER — OFFICE VISIT (OUTPATIENT)
Dept: CARDIOLOGY | Facility: MEDICAL CENTER | Age: 70
End: 2020-09-30
Payer: MEDICARE

## 2020-09-30 VITALS
OXYGEN SATURATION: 95 % | SYSTOLIC BLOOD PRESSURE: 150 MMHG | DIASTOLIC BLOOD PRESSURE: 90 MMHG | BODY MASS INDEX: 31.92 KG/M2 | HEART RATE: 76 BPM | HEIGHT: 70 IN | WEIGHT: 223 LBS

## 2020-09-30 DIAGNOSIS — Z79.01 CHRONIC ANTICOAGULATION: Chronic | ICD-10-CM

## 2020-09-30 DIAGNOSIS — I48.20 ATRIAL FIBRILLATION, CHRONIC (HCC): ICD-10-CM

## 2020-09-30 DIAGNOSIS — I50.20 ACC/AHA STAGE C HEART FAILURE WITH REDUCED EJECTION FRACTION (HCC): ICD-10-CM

## 2020-09-30 DIAGNOSIS — R60.0 LOCALIZED EDEMA: ICD-10-CM

## 2020-09-30 DIAGNOSIS — E78.00 PURE HYPERCHOLESTEROLEMIA: Chronic | ICD-10-CM

## 2020-09-30 LAB
LV EJECT FRACT MOD 2C 99903: 52.27
LV EJECT FRACT MOD 4C 99902: 44
LV EJECT FRACT MOD BP 99901: 47.68

## 2020-09-30 PROCEDURE — 99214 OFFICE O/P EST MOD 30 MIN: CPT | Performed by: NURSE PRACTITIONER

## 2020-09-30 ASSESSMENT — ENCOUNTER SYMPTOMS
VOMITING: 0
DIZZINESS: 0
CLAUDICATION: 0
ORTHOPNEA: 0
NAUSEA: 0
FEVER: 0
CHILLS: 0
PND: 0
PALPITATIONS: 1
SHORTNESS OF BREATH: 0
HEMOPTYSIS: 0
HEADACHES: 0
WHEEZING: 0
SPUTUM PRODUCTION: 0
COUGH: 0

## 2020-09-30 NOTE — PROGRESS NOTES
"No chief complaint on file.      Subjective:   Maury Stewart is a 70 y.o. male who presents today for follow-up evaluation of chronic atrial fibrillation, chronic anticoagulation, hypertension and hyperlipidemia in addition he has a history of obstructive sleep apnea with intolerance to CPAP therapy.     Last seen on  8/19/2020 by Dr. Calderón, patient has had echocardiogram on 9/2/2020 indicating EF 45-50%, mild MR, normal right heart pressures, and severely dilated LA with atrial fibrillation.  He has had issues with lower extremity edema and increasing creatinine levels.  Patient was placed on furosemide 80 mg due to edema however creatinine increased to 2.62.  NT proBNP was 6113.  The increase of furosemide definitely helped his lower extremity significantly, however he is concerned about his creatinine being over 2.  He is to see Dr. Vinson late October.  He is going to call and see if he can get a sooner appointment.  Ultimately his lower extremity edema is minimal currently and his shortness of breath is improved.  Chest x-ray showed no acute cardiopulmonary process and no pleural effusions.     Since 3/5/2020 the patient has developed increasing lower extremity edema, exertional shortness of breath walking up a grade while playing golf, increasing abdominal distention in addition to urinary frequency not completely eliminating his bladder each time.  No history of prostate problems.  Saw Dr. Vinson, nephrologist 3 weeks ago but mention not of this to her.  No chest discomfort.  Drinks \"a lot of water\".  Last appointment diltiazem increased due to severe labile hypertension which resulted in cancellation of at least 2 procedures.  Recent creatinine 8/13 2.11.     Since 6/20/2018 appointment the patient has had no cardiac problems or symptoms.  Has follow-up appointment with nephrologist next week.  Checks blood pressure 3 times weekly generally runs below 140 systolic.     Since 9/27/2018 the " patient said no cardiac problems or symptoms.  He has noted a slightly productive cough and mild fever last night.  Has a follow-up appointment with PCP in the near future.     Since 5/1/2018 appointment the patient developed dizziness and balance problems with vertigo with increasing dose of losartan which he reduced back to his previous dose with resolution of his dizziness and balance problems.   No bleeding problems.  No other cardiac symptoms.  Remains active playing golf.  Just returned from a trip visiting his daughter in [place], [place].     Since 3/19/2018 the patient's lower extremity edema has resolved after reduction of Cardizem CD from 360 mg to 120 mg.  The patient has closely monitor his blood pressure fluctuates on average in the 140s to 150 range.  No other cardiac symptoms.     The patient was recently on 2/26/2018 by Dr. Scot Curry for uncontrolled hypertension resulting in cancellation of left knee arthroscopy.  Dr. Curry increased losartan from 50 mg daily to 100 mg daily.  The patient intermittently checks his blood pressure and notices it to be higher in the morning.  Recent BMP shows worsening renal function.  Complains of lower extremity edema which he attributes to Cardizem after researching it on the Internet.     Previously followed by Dr. Drake Westbrook, electrophysiologist and Dr. Scot Hernández, retired cardiologist    Past Medical History:   Diagnosis Date   • Atrial fibrillation (HCC)    • Atrial flutter (HCC)    • Chronic anticoagulation    • Polycystic kidney, unspecified type    • Pure hypercholesterolemia    • Sleep apnea     on oxygen, unable to tolerate CPAP.   • Unspecified essential hypertension    • Unspecified sleep apnea      Past Surgical History:   Procedure Laterality Date   • APPENDECTOMY     • OTHER      Cardiac Cath   • OTHER      Kidney Surgery   • OTHER      Nose Surgery   • OTHER      Tonsillectomy with Adenoidectomy     Family History   Problem Relation Age of Onset   •  Heart Disease Neg Hx      Social History     Socioeconomic History   • Marital status:      Spouse name: Not on file   • Number of children: Not on file   • Years of education: Not on file   • Highest education level: Not on file   Occupational History   • Not on file   Social Needs   • Financial resource strain: Not on file   • Food insecurity     Worry: Not on file     Inability: Not on file   • Transportation needs     Medical: Not on file     Non-medical: Not on file   Tobacco Use   • Smoking status: Current Every Day Smoker     Packs/day: 0.00     Types: Cigars   • Smokeless tobacco: Never Used   Substance and Sexual Activity   • Alcohol use: Yes     Comment: moderate   • Drug use: No   • Sexual activity: Not on file   Lifestyle   • Physical activity     Days per week: Not on file     Minutes per session: Not on file   • Stress: Not on file   Relationships   • Social connections     Talks on phone: Not on file     Gets together: Not on file     Attends Jewish service: Not on file     Active member of club or organization: Not on file     Attends meetings of clubs or organizations: Not on file     Relationship status: Not on file   • Intimate partner violence     Fear of current or ex partner: Not on file     Emotionally abused: Not on file     Physically abused: Not on file     Forced sexual activity: Not on file   Other Topics Concern   • Not on file   Social History Narrative   • Not on file     No Known Allergies  Outpatient Encounter Medications as of 9/30/2020   Medication Sig Dispense Refill   • furosemide (LASIX) 80 MG Tab Take 1 Tab by mouth every day. 90 Tab 0   • losartan (COZAAR) 100 MG Tab TAKE ONE TABLET BY MOUTH EVERY DAY 90 Tab 0   • omeprazole (PRILOSEC) 40 MG delayed-release capsule omeprazole 40 mg capsule,delayed release   Take 1 capsule every day by oral route.     • warfarin (COUMADIN) 5 MG Tab Coumadin 5 mg tablet   Take 1 tablet every day by oral route.     • atorvastatin  (LIPITOR) 20 MG Tab Take 1 Tab by mouth every evening. 30 Tab 11   • DILTIAZem CD (CARDIZEM CD) 120 MG CAPSULE SR 24 HR Take 1 Cap by mouth 2 Times a Day. 60 Cap 11   • spironolactone (ALDACTONE) 25 MG Tab Take 1 Tab by mouth every day. 90 Tab 3   • VENTOLIN  (90 Base) MCG/ACT Aero Soln inhalation aerosol Inhale 1-2 Puffs by mouth as needed.     • escitalopram (LEXAPRO) 20 MG tablet Take 10 mg by mouth every day.     • omeprazole (PRILOSEC) 20 MG CPDR Take 20 mg by mouth 2 times a day.     • buPROPion SR (WELLBUTRIN-SR) 150 MG TB12 Take 150 mg by mouth every 48 hours.     • zolpidem (AMBIEN) 10 MG TABS Take 10 mg by mouth at bedtime as needed.       No facility-administered encounter medications on file as of 9/30/2020.      Review of Systems   Constitutional: Negative for chills and fever.   Respiratory: Negative for cough, hemoptysis, sputum production, shortness of breath and wheezing.    Cardiovascular: Positive for palpitations. Negative for chest pain, orthopnea, claudication, leg swelling and PND.        Occational   Gastrointestinal: Negative for nausea and vomiting.   Neurological: Negative for dizziness and headaches.   All other systems reviewed and are negative.       Objective:   There were no vitals taken for this visit.    Physical Exam   Constitutional: He appears well-developed and well-nourished.   Eyes: EOM are normal.   Neck: Neck supple. No JVD present.   Cardiovascular: Normal rate and normal heart sounds. An irregularly irregular rhythm present.   No murmur heard.  Pulses:       Radial pulses are 2+ on the right side and 2+ on the left side.   Pulmonary/Chest: Effort normal and breath sounds normal.   Abdominal: Soft.   Neurological:   CN II-XII grossly intact   Skin: Skin is warm and dry.   Psychiatric: He has a normal mood and affect. His behavior is normal. Judgment and thought content normal.   Nursing note and vitals reviewed.      Assessment:     1. Atrial fibrillation, chronic  (HCC)     2. Chronic anticoagulation     3. ACC/AHA stage C heart failure with reduced ejection fraction (HCC)     4. Localized edema     5. Pure hypercholesterolemia         Medical Decision Making:  Today's Assessment / Status / Plan:   Continue current medical regimen.  Obtain BMP next week.  Call nephrology to see if he can be seen sooner.      Follow-up visit in approximately 3 months with Dr Calderón.    Please note that this dictation was created using voice recognition software.  I have made every reasonable attempt to correct obvious errors, but it is possible there are errors of grammar or possibly content that I did not discover before finalizing the note.

## 2020-11-18 ENCOUNTER — TELEPHONE (OUTPATIENT)
Dept: CARDIOLOGY | Facility: MEDICAL CENTER | Age: 70
End: 2020-11-18

## 2020-11-18 NOTE — TELEPHONE ENCOUNTER
Chart Prep    LVM for patient regarding BMP per SW needing to be done before [facility] 12/3/2020. If lab was already obtained need to know where so I can get records. Left call back number.

## 2020-11-19 NOTE — TELEPHONE ENCOUNTER
DB    TO: /Cecilia  NM: Maury Stewart    PH: [phone]   PT NM: Maury Stewart    : 50   REG DR: Dr Calderón    RE: Returning your call he completed  labs on 10/27 please call so he can  advised where they were done    DISP HIST: 2020 02:45P

## 2020-12-03 ENCOUNTER — OFFICE VISIT (OUTPATIENT)
Dept: CARDIOLOGY | Facility: MEDICAL CENTER | Age: 70
End: 2020-12-03
Payer: MEDICARE

## 2020-12-03 VITALS
DIASTOLIC BLOOD PRESSURE: 80 MMHG | HEIGHT: 70 IN | RESPIRATION RATE: 16 BRPM | BODY MASS INDEX: 32.64 KG/M2 | HEART RATE: 86 BPM | OXYGEN SATURATION: 97 % | WEIGHT: 228 LBS | SYSTOLIC BLOOD PRESSURE: 140 MMHG

## 2020-12-03 DIAGNOSIS — I48.20 ATRIAL FIBRILLATION, CHRONIC (HCC): ICD-10-CM

## 2020-12-03 DIAGNOSIS — E78.00 PURE HYPERCHOLESTEROLEMIA: Chronic | ICD-10-CM

## 2020-12-03 DIAGNOSIS — I50.20 ACC/AHA STAGE C HEART FAILURE WITH REDUCED EJECTION FRACTION (HCC): ICD-10-CM

## 2020-12-03 DIAGNOSIS — Z79.01 CHRONIC ANTICOAGULATION: Chronic | ICD-10-CM

## 2020-12-03 DIAGNOSIS — I10 ESSENTIAL HYPERTENSION: Chronic | ICD-10-CM

## 2020-12-03 DIAGNOSIS — I34.0 MITRAL VALVE INSUFFICIENCY, UNSPECIFIED ETIOLOGY: ICD-10-CM

## 2020-12-03 PROCEDURE — 99215 OFFICE O/P EST HI 40 MIN: CPT | Performed by: INTERNAL MEDICINE

## 2020-12-03 RX ORDER — CARVEDILOL 12.5 MG/1
12.5 TABLET ORAL 2 TIMES DAILY WITH MEALS
Qty: 60 TAB | Refills: 3 | Status: SHIPPED | OUTPATIENT
Start: 2020-12-03 | End: 2021-01-04 | Stop reason: SDUPTHER

## 2020-12-03 ASSESSMENT — ENCOUNTER SYMPTOMS
DIZZINESS: 0
LOSS OF CONSCIOUSNESS: 0
PALPITATIONS: 0
SHORTNESS OF BREATH: 0
COUGH: 0
BRUISES/BLEEDS EASILY: 0
MYALGIAS: 0

## 2020-12-03 NOTE — LETTER
"     [facility]   [address], [address]  [place] [address]  Phone: [phone]  Fax: [phone]              Zander Stewart  1950    Encounter Date: 12/3/2020    Eugene Calderón M.D.          PROGRESS NOTE:  Chief Complaint   Patient presents with   • HTN (Controlled)   • Atrial Fibrillation       Subjective:   Zander Stewart is a 70 y.o. male who presents today for follow-up evaluation of chronic atrial fibrillation, chronic anticoagulation, hypertension and hyperlipidemia in addition he has a history of obstructive sleep apnea with intolerance to CPAP therapy.     Last seen on 9/30/2020.    Since 3/5/2020 the patient has developed increasing lower extremity edema, exertional shortness of breath walking up a grade while playing golf, increasing abdominal distention in addition to urinary frequency not completely eliminating his bladder each time.  No history of prostate problems.  Saw Dr. Vinson, nephrologist 3 weeks ago but mention not of this to her.  No chest discomfort.  Drinks \"a lot of water\".  Last appointment diltiazem increased due to severe labile hypertension which resulted in cancellation of at least 2 procedures.  Recent creatinine 8/13 2.11.    Since 6/20/2018 appointment the patient has had no cardiac problems or symptoms.  Has follow-up appointment with nephrologist next week.  Checks blood pressure 3 times weekly generally runs below 140 systolic.    Since 9/27/2018 the patient said no cardiac problems or symptoms.  He has noted a slightly productive cough and mild fever last night.  Has a follow-up appointment with PCP in the near future.    Since 5/1/2018 appointment the patient developed dizziness and balance problems with vertigo with increasing dose of losartan which he reduced back to his previous dose with resolution of his dizziness and balance problems.   No bleeding problems.  No other cardiac symptoms.  Remains " active playing golf.  Just returned from a trip visiting his daughter in [place], [place].    Since 3/19/2018 the patient's lower extremity edema has resolved after reduction of Cardizem CD from 360 mg to 120 mg.  The patient has closely monitor his blood pressure fluctuates on average in the 140s to 150 range.  No other cardiac symptoms.     The patient was recently on 2/26/2018 by Dr. Scot Curry for uncontrolled hypertension resulting in cancellation of left knee arthroscopy.  Dr. Curry increased losartan from 50 mg daily to 100 mg daily.  The patient intermittently checks his blood pressure and notices it to be higher in the morning.  Recent BMP shows worsening renal function.  Complains of lower extremity edema which he attributes to Cardizem after researching it on the Internet.     Previously followed by Dr. Drake Westbrook, electrophysiologist and Dr. Scot Hernández, retired cardiologist     Past Medical History:   Diagnosis Date   • Atrial fibrillation (HCC)    • Atrial flutter (HCC)    • Chronic anticoagulation    • Polycystic kidney, unspecified type    • Pure hypercholesterolemia    • Sleep apnea     on oxygen, unable to tolerate CPAP.   • Unspecified essential hypertension    • Unspecified sleep apnea      Past Surgical History:   Procedure Laterality Date   • APPENDECTOMY     • OTHER      Cardiac Cath   • OTHER      Kidney Surgery   • OTHER      Nose Surgery   • OTHER      Tonsillectomy with Adenoidectomy     Family History   Problem Relation Age of Onset   • Heart Disease Neg Hx      Social History     Socioeconomic History   • Marital status:      Spouse name: Not on file   • Number of children: Not on file   • Years of education: Not on file   • Highest education level: Not on file   Occupational History   • Not on file   Social Needs   • Financial resource strain: Not on file   • Food insecurity     Worry: Not on file     Inability: Not on file   • Transportation needs     Medical: Not on file      Non-medical: Not on file   Tobacco Use   • Smoking status: Current Every Day Smoker     Packs/day: 0.00     Types: Cigars   • Smokeless tobacco: Never Used   Substance and Sexual Activity   • Alcohol use: Yes     Comment: moderate   • Drug use: No   • Sexual activity: Not on file   Lifestyle   • Physical activity     Days per week: Not on file     Minutes per session: Not on file   • Stress: Not on file   Relationships   • Social connections     Talks on phone: Not on file     Gets together: Not on file     Attends Jew service: Not on file     Active member of club or organization: Not on file     Attends meetings of clubs or organizations: Not on file     Relationship status: Not on file   • Intimate partner violence     Fear of current or ex partner: Not on file     Emotionally abused: Not on file     Physically abused: Not on file     Forced sexual activity: Not on file   Other Topics Concern   • Not on file   Social History Narrative   • Not on file     No Known Allergies  Outpatient Encounter Medications as of 12/3/2020   Medication Sig Dispense Refill   • furosemide (LASIX) 80 MG Tab Take 1 Tab by mouth every day. 90 Tab 0   • losartan (COZAAR) 100 MG Tab TAKE ONE TABLET BY MOUTH EVERY DAY 90 Tab 0   • warfarin (COUMADIN) 5 MG Tab Coumadin 5 mg tablet   Take 1 tablet every day by oral route.     • atorvastatin (LIPITOR) 20 MG Tab Take 1 Tab by mouth every evening. 30 Tab 11   • DILTIAZem CD (CARDIZEM CD) 120 MG CAPSULE SR 24 HR Take 1 Cap by mouth 2 Times a Day. 60 Cap 11   • VENTOLIN  (90 Base) MCG/ACT Aero Soln inhalation aerosol Inhale 1-2 Puffs by mouth as needed.     • omeprazole (PRILOSEC) 20 MG CPDR Take 20 mg by mouth 2 times a day.     • zolpidem (AMBIEN) 10 MG TABS Take 10 mg by mouth at bedtime as needed.     • omeprazole (PRILOSEC) 40 MG delayed-release capsule omeprazole 40 mg capsule,delayed release   Take 1 capsule every day by oral route.     • escitalopram (LEXAPRO) 20 MG tablet  "Take 10 mg by mouth every day.     • buPROPion SR (WELLBUTRIN-SR) 150 MG TB12 Take 150 mg by mouth every 48 hours.       No facility-administered encounter medications on file as of 12/3/2020.      Review of Systems   Respiratory: Negative for cough and shortness of breath.    Cardiovascular: Negative for chest pain and palpitations.   Musculoskeletal: Negative for myalgias.   Neurological: Negative for dizziness and loss of consciousness.   Endo/Heme/Allergies: Does not bruise/bleed easily.        Objective:   /80 (BP Location: Left arm, Patient Position: Sitting, BP Cuff Size: Adult)   Pulse 86   Resp 16   Ht 1.778 m (5' 10\")   Wt 103.4 kg (228 lb)   SpO2 97%   BMI 32.71 kg/m²     Physical Exam   Constitutional: He is oriented to person, place, and time. He appears well-developed and well-nourished. No distress.   Eyes: Pupils are equal, round, and reactive to light. Conjunctivae and EOM are normal.   Neck: Normal range of motion. Neck supple. JVD present.   Elevated JVP at 40 degrees.   Cardiovascular: Normal rate, normal heart sounds and intact distal pulses. An irregularly irregular rhythm present. Exam reveals no gallop and no friction rub.   No murmur heard.  Pulses:       Carotid pulses are 2+ on the right side and 2+ on the left side.  Pulmonary/Chest: Effort normal and breath sounds normal. No accessory muscle usage. No respiratory distress. He has no wheezes. He has no rales.   Increased AP diameter.   Abdominal: Soft. He exhibits no distension and no mass. There is no hepatosplenomegaly. There is no abdominal tenderness.   Markedly protuberant possible ascites.   Musculoskeletal:         General: Edema present.      Comments: Chronic dermatologic changes lower extremities.   Neurological: He is alert and oriented to person, place, and time.   Skin: Skin is warm, dry and intact. No rash noted. No cyanosis. Nails show no clubbing.   Psychiatric: He has a normal mood and affect. His behavior is " normal.   Vitals reviewed.    ECHOCARDIOGRAM 06/06/2013  Left ejection fraction 55-60%.  Mild concentric left ventricular hypertrophy.  Mild mitral regurgitation.  Severe left atrial dilatation.  Moderate right atrial dilatation.    ECHOCARDIOGRAM 09/02/2020  No prior study is available for comparison.   Normal left ventricular chamber size.  Mildly reduced left ventricular systolic function.  Left ventricular ejection fraction is visually estimated to be 45-50%.  Aortic sclerosis without stenosis.  Mild mitral regurgitation.  Normal right ventricular size and systolic function.  Right heart pressures are normal.  Severely dilated left atrium.  Rythym is atrial fibrillation.     EKG 04/25/2013 atrial fibrillation, rate 83.    Assessment:     1. ACC/AHA stage C heart failure with reduced ejection fraction (HCC)     2. Atrial fibrillation, chronic (HCC)     3. Chronic anticoagulation     4. Essential hypertension     5. Pure hypercholesterolemia       Medical Decision Making:  Today's Assessment / Status / Plan:     Assessment  Volume overload possibly multifactorial    Atrial fibrillation. Chronic. Rate controlled.     Anticoagulation.  On warfarin.    Hypertension.       Hyperlipidemia. On simvastatin.     Chronic kidney disease.     Lower extremity edema.  Essentially resolved with reduction of Cardizem.    Recommendation and recommendation  1.  Currently the patient is volume overloaded etiology include worsening renal function creatinine now over 2, excessive fluid intake superimposed on CKD with need to rule out LV dysfunction.  2.  Instructed to decrease water intake.  3.  Increase Lasix to 80 mg x 1 dose to assess response.  4.  Daily weight.  5.  Echocardiogram.  6.  CXR.  7.  EKG shows atrial fibrillation rate controlled 80 bpm unchanged since 2013.          Kat Vinson M.D.  [place] [id number]  Via Fax: [phone]

## 2020-12-03 NOTE — PROGRESS NOTES
"Chief Complaint   Patient presents with   • HTN (Controlled)   • Atrial Fibrillation       Subjective:   Zander Stewart is a 70 y.o. male who presents today for follow-up evaluation of chronic atrial fibrillation, heart failure, chronic anticoagulation, hypertension and hyperlipidemia in addition he has a history of obstructive sleep apnea with intolerance to CPAP therapy.     Last seen on 9/30/2020 by ALIDA Barboza.    Since 9/30/2020 the patient more recently has developed recurrent exertional shortness of breath.  I initially saw him on 8/19 with volume overload.  Creatinine had increased to 2.1.  Lasix increased to 80 mg daily and the patient shortness of breath resolved.  Follow-up creatinine was further elevated and the patient recently decreased Lasix to 40 mg daily and now has more shortness of breath and increasing abdominal girth.  Denies history of liver disease.    Since 3/5/2020 the patient has developed increasing lower extremity edema, exertional shortness of breath walking up a grade while playing golf, increasing abdominal distention in addition to urinary frequency not completely eliminating his bladder each time.  No history of prostate problems.  Saw Dr. Vinson, nephrologist 3 weeks ago but mention not of this to her.  No chest discomfort.  Drinks \"a lot of water\".  Last appointment diltiazem increased due to severe labile hypertension which resulted in cancellation of at least 2 procedures.  Recent creatinine 8/13 2.11.    Since 3/19/2018 the patient's lower extremity edema has resolved after reduction of Cardizem CD from 360 mg to 120 mg.  The patient has closely monitor his blood pressure fluctuates on average in the 140s to 150 range.  No other cardiac symptoms.     Previously followed by Dr. Drake Westbrook, electrophysiologist and Dr. Scot Hernández, retired cardiologist     Past Medical History:   Diagnosis Date   • Atrial fibrillation (HCC)    • Atrial flutter (HCC)    • Chronic " anticoagulation    • Polycystic kidney, unspecified type    • Pure hypercholesterolemia    • Sleep apnea     on oxygen, unable to tolerate CPAP.   • Unspecified essential hypertension    • Unspecified sleep apnea      Past Surgical History:   Procedure Laterality Date   • APPENDECTOMY     • OTHER      Cardiac Cath   • OTHER      Kidney Surgery   • OTHER      Nose Surgery   • OTHER      Tonsillectomy with Adenoidectomy     Family History   Problem Relation Age of Onset   • Heart Disease Neg Hx      Social History     Socioeconomic History   • Marital status:      Spouse name: Not on file   • Number of children: Not on file   • Years of education: Not on file   • Highest education level: Not on file   Occupational History   • Not on file   Social Needs   • Financial resource strain: Not on file   • Food insecurity     Worry: Not on file     Inability: Not on file   • Transportation needs     Medical: Not on file     Non-medical: Not on file   Tobacco Use   • Smoking status: Current Every Day Smoker     Packs/day: 0.00     Types: Cigars   • Smokeless tobacco: Never Used   Substance and Sexual Activity   • Alcohol use: Yes     Comment: moderate   • Drug use: No   • Sexual activity: Not on file   Lifestyle   • Physical activity     Days per week: Not on file     Minutes per session: Not on file   • Stress: Not on file   Relationships   • Social connections     Talks on phone: Not on file     Gets together: Not on file     Attends Judaism service: Not on file     Active member of club or organization: Not on file     Attends meetings of clubs or organizations: Not on file     Relationship status: Not on file   • Intimate partner violence     Fear of current or ex partner: Not on file     Emotionally abused: Not on file     Physically abused: Not on file     Forced sexual activity: Not on file   Other Topics Concern   • Not on file   Social History Narrative   • Not on file     No Known Allergies  Outpatient  "Encounter Medications as of 12/3/2020   Medication Sig Dispense Refill   • carvedilol (COREG) 12.5 MG Tab Take 1 Tab by mouth 2 times a day, with meals. 60 Tab 3   • furosemide (LASIX) 80 MG Tab Take 1 Tab by mouth every day. 90 Tab 0   • losartan (COZAAR) 100 MG Tab TAKE ONE TABLET BY MOUTH EVERY DAY 90 Tab 0   • warfarin (COUMADIN) 5 MG Tab Coumadin 5 mg tablet   Take 1 tablet every day by oral route.     • atorvastatin (LIPITOR) 20 MG Tab Take 1 Tab by mouth every evening. 30 Tab 11   • DILTIAZem CD (CARDIZEM CD) 120 MG CAPSULE SR 24 HR Take 1 Cap by mouth 2 Times a Day. 60 Cap 11   • VENTOLIN  (90 Base) MCG/ACT Aero Soln inhalation aerosol Inhale 1-2 Puffs by mouth as needed.     • omeprazole (PRILOSEC) 20 MG CPDR Take 20 mg by mouth 2 times a day.     • zolpidem (AMBIEN) 10 MG TABS Take 10 mg by mouth at bedtime as needed.     • omeprazole (PRILOSEC) 40 MG delayed-release capsule omeprazole 40 mg capsule,delayed release   Take 1 capsule every day by oral route.     • escitalopram (LEXAPRO) 20 MG tablet Take 10 mg by mouth every day.     • buPROPion SR (WELLBUTRIN-SR) 150 MG TB12 Take 150 mg by mouth every 48 hours.       No facility-administered encounter medications on file as of 12/3/2020.      Review of Systems   Respiratory: Negative for cough and shortness of breath.    Cardiovascular: Negative for chest pain and palpitations.   Musculoskeletal: Negative for myalgias.   Neurological: Negative for dizziness and loss of consciousness.   Endo/Heme/Allergies: Does not bruise/bleed easily.        Objective:   /80 (BP Location: Left arm, Patient Position: Sitting, BP Cuff Size: Adult)   Pulse 86   Resp 16   Ht 1.778 m (5' 10\")   Wt 103.4 kg (228 lb)   SpO2 97%   BMI 32.71 kg/m²     Physical Exam   Constitutional: He is oriented to person, place, and time. He appears well-developed and well-nourished. No distress.   Neck: No JVD present.   Normal at 90 degrees.   Cardiovascular: Normal rate, " normal heart sounds and intact distal pulses. An irregularly irregular rhythm present. Exam reveals no gallop and no friction rub.   No murmur heard.  Pulses:       Carotid pulses are 2+ on the right side and 2+ on the left side.  Pulmonary/Chest: Effort normal and breath sounds normal. No accessory muscle usage. No respiratory distress. He has no wheezes. He has no rales.   Increased AP diameter.   Abdominal:   Markedly protuberant possible ascites.   Musculoskeletal:         General: Edema present.      Comments: Chronic dermatologic changes lower extremities.   Neurological: He is alert and oriented to person, place, and time.   Skin: Skin is warm, dry and intact. No rash noted. No cyanosis. Nails show no clubbing.   Psychiatric: He has a normal mood and affect. His behavior is normal.   Vitals reviewed.    ECHOCARDIOGRAM 06/06/2013  Left ejection fraction 55-60%.  Mild concentric left ventricular hypertrophy.  Mild mitral regurgitation.  Severe left atrial dilatation.  Moderate right atrial dilatation.    ECHOCARDIOGRAM 09/02/2020  No prior study is available for comparison.   Normal left ventricular chamber size.  Mildly reduced left ventricular systolic function.  Left ventricular ejection fraction is visually estimated to be 45-50%.  Aortic sclerosis without stenosis.  Mild mitral regurgitation.  Normal right ventricular size and systolic function.  Right heart pressures are normal.  Severely dilated left atrium.  Rythym is atrial fibrillation.     EKG 04/25/2013 atrial fibrillation, rate 83.    Lab:  Creatinine 2.10 GFR 31 8/12/2020  Creatinine 2.60 GFR 27 9/22/2020  Creatinine 2.07 GFR 32 11/24/2020    Assessment:     1. ACC/AHA stage C heart failure with reduced ejection fraction (HCC)  EC-ECHOCARDIOGRAM COMPLETE W/O CONT   2. Atrial fibrillation, chronic (HCC)     3. Mitral valve insufficiency, unspecified etiology     4. Chronic anticoagulation     5. Essential hypertension     6. Pure  hypercholesterolemia       Medical Decision Making:  Today's Assessment / Status / Plan:     Assessment  1.  Atrial fibrillation. Chronic. Rate controlled.  2.  Systolic CHF, EF 50%.  3.  Mitral regurgitation, mild  4.  Anticoagulation.  On warfarin.  5.  Hypertension.     6.  Hyperlipidemia.    7.  CKD, progressive.  8.  Lower extremity edema.    9.  Elevated bilirubin and alkaline phosphatase.    Recommendation and recommendation  1.  The patient has mildly decompensated congestive heart failure mild decreased LVEF aggravated by worsening renal function; discussed heart failure, renal syndrome with the patient.  2.  Currently has shortness of breath symptoms are related to reduction in his Lasix dosage which he did because of concern of his worsening renal function nonetheless states that he felt much better after the initial increase and Lasix dose of 80 mg when he presented with volume overload on 8/19/2020.  3.  Otherwise stable from the standpoint of atrial fibrillation, rate control, anticoagulation, therapeutic and dyslipidemia on statin therapy.  4.  Instructed patient to maintain a daily log including weight, BP and heart rate.  5.  DC diltiazem.  6.  Start carvedilol 12.5 mg BID.  7.  Alternate Lasix 80 mg / 40 mg QOD.  8.  Will repeat echocardiogram.  9.  Reviewed laboratory results and previous echocardiogram images with the patient.  10.  RTC 4 weeks.  11.  Has appointment with Dr. Vinson next week.    Level of service 46471 new problem with chronic stable 3 problems, data reviewed, treatment initiated and diagnostic tests ordered.

## 2020-12-08 ENCOUNTER — HOSPITAL ENCOUNTER (OUTPATIENT)
Dept: CARDIOLOGY | Facility: MEDICAL CENTER | Age: 70
End: 2020-12-08
Attending: INTERNAL MEDICINE
Payer: MEDICARE

## 2020-12-08 DIAGNOSIS — I50.20 ACC/AHA STAGE C HEART FAILURE WITH REDUCED EJECTION FRACTION (HCC): ICD-10-CM

## 2020-12-08 PROCEDURE — 93306 TTE W/DOPPLER COMPLETE: CPT

## 2020-12-09 LAB
LV EJECT FRACT MOD 2C 99903: 20.69
LV EJECT FRACT MOD 4C 99902: 43.56
LV EJECT FRACT MOD BP 99901: 35.88

## 2020-12-09 PROCEDURE — 93306 TTE W/DOPPLER COMPLETE: CPT | Mod: 26 | Performed by: INTERNAL MEDICINE

## 2020-12-10 ENCOUNTER — TELEPHONE (OUTPATIENT)
Dept: CARDIOLOGY | Facility: MEDICAL CENTER | Age: 70
End: 2020-12-10

## 2020-12-16 NOTE — TELEPHONE ENCOUNTER
Called and spoke with patient about follow-up echocardiogram showing what appears to be now severe mitral regurgitation.  Shortness of breath stabilized.  States BP improved since starting carvedilol.  Saw nephrology and renal ultrasound pending also to be scheduled abdominal ultrasound.  Recommended [place], explained rationale and procedure.  Wishes to wait to next appointment and will review with him and his wife.

## 2021-01-04 ENCOUNTER — OFFICE VISIT (OUTPATIENT)
Dept: CARDIOLOGY | Facility: MEDICAL CENTER | Age: 71
End: 2021-01-04
Payer: MEDICARE

## 2021-01-04 VITALS
SYSTOLIC BLOOD PRESSURE: 124 MMHG | DIASTOLIC BLOOD PRESSURE: 80 MMHG | WEIGHT: 227 LBS | HEART RATE: 78 BPM | BODY MASS INDEX: 32.5 KG/M2 | OXYGEN SATURATION: 95 % | HEIGHT: 70 IN

## 2021-01-04 DIAGNOSIS — I48.20 ATRIAL FIBRILLATION, CHRONIC (HCC): ICD-10-CM

## 2021-01-04 DIAGNOSIS — Z79.01 CHRONIC ANTICOAGULATION: Chronic | ICD-10-CM

## 2021-01-04 DIAGNOSIS — I34.0 MITRAL VALVE INSUFFICIENCY, UNSPECIFIED ETIOLOGY: ICD-10-CM

## 2021-01-04 DIAGNOSIS — I50.20 ACC/AHA STAGE C HEART FAILURE WITH REDUCED EJECTION FRACTION (HCC): ICD-10-CM

## 2021-01-04 DIAGNOSIS — I10 ESSENTIAL HYPERTENSION: Chronic | ICD-10-CM

## 2021-01-04 DIAGNOSIS — E78.00 PURE HYPERCHOLESTEROLEMIA: Chronic | ICD-10-CM

## 2021-01-04 PROCEDURE — 99214 OFFICE O/P EST MOD 30 MIN: CPT | Performed by: INTERNAL MEDICINE

## 2021-01-04 RX ORDER — CARVEDILOL 12.5 MG/1
12.5 TABLET ORAL 2 TIMES DAILY WITH MEALS
Qty: 90 TAB | Refills: 3 | Status: SHIPPED | OUTPATIENT
Start: 2021-01-04 | End: 2021-07-30

## 2021-01-04 ASSESSMENT — ENCOUNTER SYMPTOMS
BRUISES/BLEEDS EASILY: 0
LOSS OF CONSCIOUSNESS: 0
PALPITATIONS: 0
DIZZINESS: 0
SHORTNESS OF BREATH: 0
MYALGIAS: 0
COUGH: 0

## 2021-01-04 NOTE — PROGRESS NOTES
"Chief Complaint   Patient presents with   • Atrial Fibrillation     & ACC/AHA stage C heart failure with reduced ejection fraction (HCC)       Subjective:   Zander Stewart is a 70 y.o. male who presents today for follow-up evaluation of chronic atrial fibrillation, heart failure, chronic anticoagulation, hypertension and hyperlipidemia with a history of CKD, polycystic kidney disease, obstructive sleep apnea with intolerance to CPAP therapy.     Last seen on 12/3/2020.    Since 12/3/2020 appointment the patient is feeling better.  Less short of breath.  At last appointment had DC diltiazem and started carvedilol with improvement of BP.  Still on Lasix 80/40 and losartan.  Edema has resolved since diltiazem DC'd and this is confirmed by his wife    Since 9/30/2020 the patient more recently has developed recurrent exertional shortness of breath.  I initially saw him on 8/19 with volume overload.  Creatinine had increased to 2.1.  Lasix increased to 80 mg daily and the patient shortness of breath resolved.  Follow-up creatinine was further elevated and the patient recently decreased Lasix to 40 mg daily and now has more shortness of breath and increasing abdominal girth.  Denies history of liver disease.    Since 3/5/2020 the patient has developed increasing lower extremity edema, exertional shortness of breath walking up a grade while playing golf, increasing abdominal distention in addition to urinary frequency not completely eliminating his bladder each time.  No history of prostate problems.  Saw Dr. Vinson, nephrologist 3 weeks ago but mention not of this to her.  No chest discomfort.  Drinks \"a lot of water\".  Last appointment diltiazem increased due to severe labile hypertension which resulted in cancellation of at least 2 procedures.  Recent creatinine 8/13 2.11.    Since 3/19/2018 the patient's lower extremity edema has resolved after reduction of Cardizem CD from 360 mg to 120 mg.  The patient has " closely monitor his blood pressure fluctuates on average in the 140s to 150 range.  No other cardiac symptoms.     Previously followed by Dr. Drake Westbrook, electrophysiologist and Dr. Scot Hernández, retired cardiologist     Past Medical History:   Diagnosis Date   • Atrial fibrillation (HCC)    • Atrial flutter (HCC)    • Chronic anticoagulation    • Polycystic kidney, unspecified type    • Pure hypercholesterolemia    • Sleep apnea     on oxygen, unable to tolerate CPAP.   • Unspecified essential hypertension    • Unspecified sleep apnea      Past Surgical History:   Procedure Laterality Date   • APPENDECTOMY     • OTHER      Cardiac Cath   • OTHER      Kidney Surgery   • OTHER      Nose Surgery   • OTHER      Tonsillectomy with Adenoidectomy     Family History   Problem Relation Age of Onset   • Heart Disease Neg Hx      Social History     Socioeconomic History   • Marital status:      Spouse name: Not on file   • Number of children: Not on file   • Years of education: Not on file   • Highest education level: Not on file   Occupational History   • Not on file   Social Needs   • Financial resource strain: Not on file   • Food insecurity     Worry: Not on file     Inability: Not on file   • Transportation needs     Medical: Not on file     Non-medical: Not on file   Tobacco Use   • Smoking status: Current Every Day Smoker     Packs/day: 0.00     Types: Cigars   • Smokeless tobacco: Never Used   Substance and Sexual Activity   • Alcohol use: Yes     Comment: moderate   • Drug use: No   • Sexual activity: Not on file   Lifestyle   • Physical activity     Days per week: Not on file     Minutes per session: Not on file   • Stress: Not on file   Relationships   • Social connections     Talks on phone: Not on file     Gets together: Not on file     Attends Shinto service: Not on file     Active member of club or organization: Not on file     Attends meetings of clubs or organizations: Not on file     Relationship  status: Not on file   • Intimate partner violence     Fear of current or ex partner: Not on file     Emotionally abused: Not on file     Physically abused: Not on file     Forced sexual activity: Not on file   Other Topics Concern   • Not on file   Social History Narrative   • Not on file     No Known Allergies  Outpatient Encounter Medications as of 1/4/2021   Medication Sig Dispense Refill   • carvedilol (COREG) 12.5 MG Tab Take 1 Tab by mouth 2 times a day, with meals. 60 Tab 3   • furosemide (LASIX) 80 MG Tab Take 1 Tab by mouth every day. 90 Tab 0   • losartan (COZAAR) 100 MG Tab TAKE ONE TABLET BY MOUTH EVERY DAY 90 Tab 0   • warfarin (COUMADIN) 5 MG Tab Coumadin 5 mg tablet   Take 1 tablet every day by oral route.     • atorvastatin (LIPITOR) 20 MG Tab Take 1 Tab by mouth every evening. 30 Tab 11   • VENTOLIN  (90 Base) MCG/ACT Aero Soln inhalation aerosol Inhale 1-2 Puffs by mouth as needed.     • omeprazole (PRILOSEC) 20 MG CPDR Take 20 mg by mouth 2 times a day.     • zolpidem (AMBIEN) 10 MG TABS Take 10 mg by mouth at bedtime as needed.     • omeprazole (PRILOSEC) 40 MG delayed-release capsule omeprazole 40 mg capsule,delayed release   Take 1 capsule every day by oral route.     • DILTIAZem CD (CARDIZEM CD) 120 MG CAPSULE SR 24 HR Take 1 Cap by mouth 2 Times a Day. (Patient not taking: Reported on 1/4/2021) 60 Cap 11   • escitalopram (LEXAPRO) 20 MG tablet Take 10 mg by mouth every day.     • buPROPion SR (WELLBUTRIN-SR) 150 MG TB12 Take 150 mg by mouth every 48 hours.       No facility-administered encounter medications on file as of 1/4/2021.      Review of Systems   Respiratory: Negative for cough and shortness of breath.    Cardiovascular: Negative for chest pain and palpitations.   Musculoskeletal: Negative for myalgias.   Neurological: Negative for dizziness and loss of consciousness.   Endo/Heme/Allergies: Does not bruise/bleed easily.        Objective:   /80 (BP Location: Left arm,  "Patient Position: Sitting, BP Cuff Size: Adult)   Pulse 78   Ht 1.778 m (5' 10\")   Wt 103 kg (227 lb)   SpO2 95%   BMI 32.57 kg/m²     Physical Exam   Constitutional: He is oriented to person, place, and time. He appears well-developed and well-nourished. No distress.   Neck: No JVD present.   Normal at 90 degrees.   Cardiovascular: Normal rate and intact distal pulses. An irregularly irregular rhythm present. Exam reveals no gallop and no friction rub.   Murmur heard.  Pulses:       Carotid pulses are 2+ on the right side and 2+ on the left side.  Pulmonary/Chest: Effort normal and breath sounds normal. No accessory muscle usage. No respiratory distress. He has no wheezes. He has no rales.   Increased AP diameter.   Abdominal:   Markedly protuberant possible ascites.   Musculoskeletal:      Comments: Chronic dermatologic changes lower extremities.   Neurological: He is alert and oriented to person, place, and time.   Skin: Skin is warm, dry and intact. No rash noted. No cyanosis. Nails show no clubbing.   Psychiatric: He has a normal mood and affect. His behavior is normal.   Vitals reviewed.    ECHOCARDIOGRAM 06/06/2013  Left ejection fraction 55-60%.  Mild concentric left ventricular hypertrophy.  Mild mitral regurgitation.  Severe left atrial dilatation.  Moderate right atrial dilatation.    ECHOCARDIOGRAM 09/02/2020  No prior study is available for comparison.   Normal left ventricular chamber size.  Mildly reduced left ventricular systolic function.  Left ventricular ejection fraction is visually estimated to be 45-50%.  Aortic sclerosis without stenosis.  Mild mitral regurgitation.  Normal right ventricular size and systolic function.  Right heart pressures are normal.  Severely dilated left atrium.  Rythym is atrial fibrillation.     EKG 04/25/2013 atrial fibrillation, rate 83.    Lab:  Creatinine 2.10 GFR 31 8/12/2020  Creatinine 2.60 GFR 27 9/22/2020  Creatinine 2.07 GFR 32 11/24/2020    Assessment: "     1. Atrial fibrillation, chronic (HCC)     2. Chronic anticoagulation     3. ACC/AHA stage C heart failure with reduced ejection fraction (HCC)     4. Mitral valve insufficiency, unspecified etiology     5. Essential hypertension     6. Pure hypercholesterolemia       Medical Decision Making:  Today's Assessment / Status / Plan:     Assessment  1.  Atrial fibrillation. Chronic. Rate controlled.  2.  Systolic CHF, EF 50%.  3.  Mitral regurgitation.  4.  Anticoagulation.  On warfarin.  5.  Hypertension.     6.  Hyperlipidemia.    7.  CKD.  8.  Lower extremity edema.    9.  Elevated bilirubin and alkaline phosphatase.    Recommendation and recommendation  1.  Clinically the patient is improved with no evidence of volume overload today, resolution of edema with diltiazem discontinuation and BP improved on carvedilol and laboratory reveals creatinine improved from 2.3 to 2.0.  2.  Reviewed last echocardiogram images with the patient and his wife my concern being worsening mitral regurgitation and recommendation for [place].  3.  With the patient's improvement it was decided to wait on performing [place] and reassess in 2 months unless the patient decompensates.  Level of service 74530 new problem with chronic stable 3 problems, data reviewed, treatment initiated and diagnostic tests ordered.

## 2021-01-15 DIAGNOSIS — Z23 NEED FOR VACCINATION: ICD-10-CM

## 2021-03-11 ENCOUNTER — TELEPHONE (OUTPATIENT)
Dept: CARDIOLOGY | Facility: MEDICAL CENTER | Age: 71
End: 2021-03-11

## 2021-03-11 ENCOUNTER — OFFICE VISIT (OUTPATIENT)
Dept: CARDIOLOGY | Facility: MEDICAL CENTER | Age: 71
End: 2021-03-11
Payer: MEDICARE

## 2021-03-11 VITALS
HEART RATE: 72 BPM | OXYGEN SATURATION: 98 % | WEIGHT: 233.4 LBS | DIASTOLIC BLOOD PRESSURE: 80 MMHG | SYSTOLIC BLOOD PRESSURE: 138 MMHG | BODY MASS INDEX: 33.41 KG/M2 | HEIGHT: 70 IN | RESPIRATION RATE: 12 BRPM

## 2021-03-11 DIAGNOSIS — Z79.01 CHRONIC ANTICOAGULATION: Chronic | ICD-10-CM

## 2021-03-11 DIAGNOSIS — I34.0 MITRAL VALVE INSUFFICIENCY, UNSPECIFIED ETIOLOGY: ICD-10-CM

## 2021-03-11 DIAGNOSIS — I10 ESSENTIAL HYPERTENSION: Chronic | ICD-10-CM

## 2021-03-11 DIAGNOSIS — I48.20 ATRIAL FIBRILLATION, CHRONIC (HCC): ICD-10-CM

## 2021-03-11 DIAGNOSIS — N18.32 STAGE 3B CHRONIC KIDNEY DISEASE: ICD-10-CM

## 2021-03-11 DIAGNOSIS — I50.20 ACC/AHA STAGE C HEART FAILURE WITH REDUCED EJECTION FRACTION (HCC): ICD-10-CM

## 2021-03-11 DIAGNOSIS — M19.90 ARTHRITIS: ICD-10-CM

## 2021-03-11 DIAGNOSIS — E78.00 PURE HYPERCHOLESTEROLEMIA: Chronic | ICD-10-CM

## 2021-03-11 PROCEDURE — 99214 OFFICE O/P EST MOD 30 MIN: CPT | Performed by: INTERNAL MEDICINE

## 2021-03-11 RX ORDER — ESZOPICLONE 2 MG/1
TABLET, FILM COATED ORAL
COMMUNITY
Start: 2021-01-29 | End: 2021-03-11

## 2021-03-11 RX ORDER — ZOLPIDEM TARTRATE 12.5 MG/1
TABLET, FILM COATED, EXTENDED RELEASE ORAL
COMMUNITY
Start: 2021-03-05 | End: 2021-06-16

## 2021-03-11 ASSESSMENT — ENCOUNTER SYMPTOMS
PALPITATIONS: 0
MYALGIAS: 0
DIZZINESS: 0
BRUISES/BLEEDS EASILY: 0
LOSS OF CONSCIOUSNESS: 0
SHORTNESS OF BREATH: 0
COUGH: 0

## 2021-03-11 NOTE — TELEPHONE ENCOUNTER
Called patient to let him know that I have sent the lab slip to his address since system was down during his visit today with Dr. Perkins. Verified address with patient before checking out.  No answer, LVM to call back.    Lab slip printed and mailed.

## 2021-03-11 NOTE — TELEPHONE ENCOUNTER
Patient needs to be scheduled for a  EC-[place] W/O CONT [BA0405] (Order [id number]) ordered by Dr. Perkins

## 2021-03-11 NOTE — PROGRESS NOTES
"Chief Complaint   Patient presents with   • Hypertension     F/V Dx: Essential hypertension   • Atrial Fibrillation     F/V Dx: Atrial fibrillation, chronic (HCC)   • Congestive Heart Failure     F/V Dx: ACC/AHA stage C heart failure with reduced ejection fraction (HCC)       Subjective:   Zander Stewart is a 71 y.o. male who presents today for follow-up evaluation of chronic atrial fibrillation, heart failure, chronic anticoagulation, hypertension and hyperlipidemia with a history of CKD, polycystic kidney disease, obstructive sleep apnea with intolerance to CPAP therapy.     Last seen on 1/4/2021    Since 1/4/2021 the patient still has \"spells of intermittent S OB but not every day\".  Continues to feel that Lasix has not helped.  BP improved but generally runs 130s-140s/75.  Lower extremity edema resolved improved with DC of diltiazem and diuretic therapy.  Complains of arthritis problems of his hands and requesting rheumatology referral.  Also wishes to change to [facility].    Previously followed by Dr. Drake Westbrook, electrophysiologist and Dr. Scot Hernández, retired cardiologist     Past Medical History:   Diagnosis Date   • Atrial fibrillation (HCC)    • Atrial flutter (HCC)    • Chronic anticoagulation    • Polycystic kidney, unspecified type    • Pure hypercholesterolemia    • Sleep apnea     on oxygen, unable to tolerate CPAP.   • Unspecified essential hypertension    • Unspecified sleep apnea      Past Surgical History:   Procedure Laterality Date   • APPENDECTOMY     • OTHER      Cardiac Cath   • OTHER      Kidney Surgery   • OTHER      Nose Surgery   • OTHER      Tonsillectomy with Adenoidectomy     Family History   Problem Relation Age of Onset   • Heart Disease Neg Hx      Social History     Socioeconomic History   • Marital status:      Spouse name: Not on file   • Number of children: Not on file   • Years of education: Not on file   • Highest education level: Not on file "   Occupational History   • Not on file   Tobacco Use   • Smoking status: Current Every Day Smoker     Packs/day: 0.00     Types: Cigars   • Smokeless tobacco: Never Used   Substance and Sexual Activity   • Alcohol use: Yes     Comment: moderate   • Drug use: No   • Sexual activity: Not on file   Other Topics Concern   • Not on file   Social History Narrative   • Not on file     Social Determinants of Health     Financial Resource Strain:    • Difficulty of Paying Living Expenses:    Food Insecurity:    • Worried About Running Out of Food in the Last Year:    • Ran Out of Food in the Last Year:    Transportation Needs:    • Lack of Transportation (Medical):    • Lack of Transportation (Non-Medical):    Physical Activity:    • Days of Exercise per Week:    • Minutes of Exercise per Session:    Stress:    • Feeling of Stress :    Social Connections:    • Frequency of Communication with Friends and Family:    • Frequency of Social Gatherings with Friends and Family:    • Attends Nondenominational Services:    • Active Member of Clubs or Organizations:    • Attends Club or Organization Meetings:    • Marital Status:    Intimate Partner Violence:    • Fear of Current or Ex-Partner:    • Emotionally Abused:    • Physically Abused:    • Sexually Abused:      No Known Allergies  Outpatient Encounter Medications as of 3/11/2021   Medication Sig Dispense Refill   • zolpidem (AMBIEN CR) 12.5 MG CR tablet      • carvedilol (COREG) 12.5 MG Tab Take 1 Tab by mouth 2 times a day, with meals. 90 Tab 3   • furosemide (LASIX) 80 MG Tab Take 1 Tab by mouth every day. 90 Tab 0   • losartan (COZAAR) 100 MG Tab TAKE ONE TABLET BY MOUTH EVERY DAY 90 Tab 0   • warfarin (COUMADIN) 5 MG Tab Coumadin 5 mg tablet   Take 1 tablet every day by oral route.     • VENTOLIN  (90 Base) MCG/ACT Aero Soln inhalation aerosol Inhale 1-2 Puffs by mouth as needed.     • omeprazole (PRILOSEC) 20 MG CPDR Take 20 mg by mouth 2 times a day.     • Eszopiclone 2  "MG Tab      • omeprazole (PRILOSEC) 40 MG delayed-release capsule omeprazole 40 mg capsule,delayed release   Take 1 capsule every day by oral route.     • atorvastatin (LIPITOR) 20 MG Tab Take 1 Tab by mouth every evening. (Patient not taking: Reported on 3/11/2021) 30 Tab 11   • DILTIAZem CD (CARDIZEM CD) 120 MG CAPSULE SR 24 HR Take 1 Cap by mouth 2 Times a Day. (Patient not taking: Reported on 1/4/2021) 60 Cap 11   • escitalopram (LEXAPRO) 20 MG tablet Take 10 mg by mouth every day.     • buPROPion SR (WELLBUTRIN-SR) 150 MG TB12 Take 150 mg by mouth every 48 hours.     • zolpidem (AMBIEN) 10 MG TABS Take 10 mg by mouth at bedtime as needed.       No facility-administered encounter medications on file as of 3/11/2021.     Review of Systems   Respiratory: Negative for cough and shortness of breath.    Cardiovascular: Negative for chest pain and palpitations.   Musculoskeletal: Negative for myalgias.   Neurological: Negative for dizziness and loss of consciousness.   Endo/Heme/Allergies: Does not bruise/bleed easily.        Objective:   /80 (BP Location: Left arm, Patient Position: Sitting, BP Cuff Size: Adult)   Pulse 72   Resp 12   Ht 1.778 m (5' 10\")   Wt 106 kg (233 lb 6.4 oz)   SpO2 98%   BMI 33.49 kg/m²     Physical Exam   Constitutional: He is oriented to person, place, and time. He appears well-developed and well-nourished. No distress.   Eyes: Pupils are equal, round, and reactive to light. EOM are normal.   Neck: No JVD present.   Normal at 90 degrees.   Cardiovascular: Normal rate and intact distal pulses. An irregularly irregular rhythm present. Exam reveals no gallop and no friction rub.   Murmur heard.  Pulses:       Carotid pulses are 2+ on the right side and 2+ on the left side.  Pulmonary/Chest: Effort normal and breath sounds normal. No accessory muscle usage. No respiratory distress. He has no wheezes. He has no rales.   Increased AP diameter.   Abdominal:   Markedly protuberant " possible ascites.   Musculoskeletal:         General: No edema.      Comments: Chronic dermatologic changes lower extremities.  No pitting edema.   Neurological: He is alert and oriented to person, place, and time.   Skin: Skin is warm, dry and intact. No rash noted. No cyanosis. Nails show no clubbing.   Psychiatric: He has a normal mood and affect. His behavior is normal.   Vitals reviewed.    ECHOCARDIOGRAM 06/06/2013  Left ejection fraction 55-60%.  Mild concentric left ventricular hypertrophy.  Mild mitral regurgitation.  Severe left atrial dilatation.  Moderate right atrial dilatation.    ECHOCARDIOGRAM 09/02/2020  No prior study is available for comparison.   Normal left ventricular chamber size.  Mildly reduced left ventricular systolic function.  Left ventricular ejection fraction is visually estimated to be 45-50%.  Aortic sclerosis without stenosis.  Mild mitral regurgitation.  Normal right ventricular size and systolic function.  Right heart pressures are normal.  Severely dilated left atrium.  Rythym is atrial fibrillation.     EKG 04/25/2013 atrial fibrillation, rate 83.    Lab:  Creatinine 2.10 GFR 31 8/12/2020  Creatinine 2.60 GFR 27 9/22/2020  Creatinine 2.07 GFR 32 11/24/2020    Assessment:     1. ACC/AHA stage C heart failure with reduced ejection fraction (HCC)  Comp Metabolic Panel    CBC WITHOUT DIFFERENTIAL   2. Atrial fibrillation, chronic (HCC)     3. Mitral valve insufficiency, unspecified etiology  EC-[place] W/O CONT   4. Essential hypertension     5. Chronic anticoagulation     6. Pure hypercholesterolemia     7. Arthritis  REFERRAL TO RHEUMATOLOGY   8. Stage 3b chronic kidney disease  REFERRAL TO NEPHROLOGY     Medical Decision Making:  Today's Assessment / Status / Plan:     Assessment  1.  Atrial fibrillation. Chronic. Rate controlled.  2.  Systolic CHF, EF 45-50%.  3.  Mitral regurgitation.  4.  Anticoagulation.  On warfarin.  5.  Pulmonary hypertension.     6.  Hypertension.  7.   Dyslipidemia.  8.  HOWARD on CPAP.  9.  CKD.  10.  Obesity.  11.  LE edema aggravated by diltiazem.    Recommendation and recommendation  1.  Per our previous discussion in 1/2021 I feel the patient needs to be further evaluated orders his mitral regurgitation which appears severe on his most recent TTE study.  He is agreeable to proceed with a [place] having explained to him the reasons for the procedure, potential risks and have discussed possible need for MitraClip procedure.  2.  Needs better blood pressure control and would increase carvedilol but he feels that this might be causing some blurred vision but will have him titrate slowly up to 25 mg twice daily.  3.  Recheck renal function.  4.  Refer to [facility].  5.  Refer to [facility].  6.  RTC 4 months

## 2021-03-13 DIAGNOSIS — I10 HTN (HYPERTENSION), MALIGNANT: ICD-10-CM

## 2021-03-16 RX ORDER — LOSARTAN POTASSIUM 100 MG/1
TABLET ORAL
Qty: 90 TABLET | Refills: 3 | Status: SHIPPED | OUTPATIENT
Start: 2021-03-16 | End: 2022-03-11

## 2021-03-16 NOTE — TELEPHONE ENCOUNTER
Patient identifier on .  LM for patient asking if he is still taking losartan as last refill was from 08/2020 for 90 day supply.  Asking if he is still taking it.

## 2021-03-16 NOTE — TELEPHONE ENCOUNTER
Received call back and he stated he last picked it up in December so he has 16 days left.    RX refilled for year supply.

## 2021-03-17 ENCOUNTER — TELEPHONE (OUTPATIENT)
Dept: CARDIOLOGY | Facility: MEDICAL CENTER | Age: 71
End: 2021-03-17

## 2021-03-17 NOTE — TELEPHONE ENCOUNTER
Patient is scheduled on 4-27-21 for a [place] w/anesthesia with Dr. Calderón. Patient was told to hold lasix AM day of procedure and to check in at 8:00 for a 10:00 procedure. Updated H&P to be done on admit by NP. Pre admit to call patient.

## 2021-03-30 ENCOUNTER — OFFICE VISIT (OUTPATIENT)
Dept: NEPHROLOGY | Facility: MEDICAL CENTER | Age: 71
End: 2021-03-30
Payer: MEDICARE

## 2021-03-30 VITALS
TEMPERATURE: 98.7 F | DIASTOLIC BLOOD PRESSURE: 72 MMHG | OXYGEN SATURATION: 95 % | WEIGHT: 234.38 LBS | HEIGHT: 70 IN | HEART RATE: 75 BPM | SYSTOLIC BLOOD PRESSURE: 136 MMHG | BODY MASS INDEX: 33.55 KG/M2

## 2021-03-30 DIAGNOSIS — N18.32 STAGE 3B CHRONIC KIDNEY DISEASE: ICD-10-CM

## 2021-03-30 DIAGNOSIS — Q61.3 POLYCYSTIC KIDNEY: ICD-10-CM

## 2021-03-30 DIAGNOSIS — I10 ESSENTIAL HYPERTENSION: ICD-10-CM

## 2021-03-30 PROCEDURE — 99204 OFFICE O/P NEW MOD 45 MIN: CPT | Performed by: INTERNAL MEDICINE

## 2021-03-30 RX ORDER — FUROSEMIDE 80 MG
TABLET ORAL
Qty: 90 TABLET | Refills: 3 | Status: SHIPPED | OUTPATIENT
Start: 2021-03-30 | End: 2022-06-27

## 2021-03-30 ASSESSMENT — ENCOUNTER SYMPTOMS
FEVER: 0
SHORTNESS OF BREATH: 1
VOMITING: 0
NAUSEA: 0
HYPERTENSION: 1
COUGH: 0
CHILLS: 0

## 2021-03-31 NOTE — PROGRESS NOTES
"Subjective:      Maury Stewart is a 71 y.o. male who presents with Chronic Kidney Disease and Hypertension            Patient is a pleasant 71-year-old gentleman with a past medical history significant for chronic kidney disease secondary to autosomal dominant polycystic kidney disease diagnosed about 25 years ago  Patient has no uremic symptoms, no recent use of NSAIDs  He does have occasional shortness of breath, which I believe is secondary to high salt intake.    Chronic Kidney Disease  The current episode started more than 1 year ago. The problem occurs constantly. The problem has been gradually worsening. Pertinent negatives include no chest pain, chills, coughing, fever, nausea, urinary symptoms or vomiting.   Hypertension  This is a chronic problem. The current episode started more than 1 year ago. The problem is unchanged. The problem is controlled. Associated symptoms include shortness of breath. Pertinent negatives include no chest pain, malaise/fatigue or peripheral edema. Risk factors for coronary artery disease include male gender and obesity. Past treatments include angiotensin blockers. The current treatment provides significant improvement. There are no compliance problems.  Hypertensive end-organ damage includes kidney disease. Identifiable causes of hypertension include chronic renal disease.       Review of Systems   Constitutional: Negative for chills, fever and malaise/fatigue.   Respiratory: Positive for shortness of breath. Negative for cough.    Cardiovascular: Negative for chest pain and leg swelling.   Gastrointestinal: Negative for nausea and vomiting.   Genitourinary: Negative for dysuria, frequency and urgency.   All other systems reviewed and are negative.         Objective:     /72 (BP Location: Left arm, Patient Position: Sitting, BP Cuff Size: Large adult)   Pulse 75   Temp 37.1 °C (98.7 °F) (Temporal)   Ht 1.778 m (5' 10\")   Wt 106 kg (234 lb 6 oz)   SpO2 95%   BMI " 33.63 kg/m²      Physical Exam  Vitals and nursing note reviewed.   Constitutional:       General: He is awake.      Appearance: Normal appearance. He is not ill-appearing.      Interventions: He is sedated and intubated.   HENT:      Head: Normocephalic and atraumatic.      Right Ear: External ear normal.      Left Ear: External ear normal.      Nose: Nose normal.      Mouth/Throat:      Pharynx: No oropharyngeal exudate or posterior oropharyngeal erythema.   Eyes:      General:         Right eye: No discharge.         Left eye: No discharge.      Conjunctiva/sclera: Conjunctivae normal.   Cardiovascular:      Rate and Rhythm: Normal rate and regular rhythm.   Pulmonary:      Effort: Pulmonary effort is normal. No respiratory distress. He is intubated.      Breath sounds: Normal breath sounds. No wheezing.   Abdominal:      General: Abdomen is flat. Bowel sounds are normal.   Musculoskeletal:         General: No tenderness.      Cervical back: No rigidity. No muscular tenderness.      Right lower leg: No edema.      Left lower leg: No edema.   Skin:     General: Skin is warm and dry.      Coloration: Skin is not jaundiced.   Neurological:      General: No focal deficit present.      Mental Status: He is alert and oriented to person, place, and time. Mental status is at baseline.   Psychiatric:         Mood and Affect: Mood normal.         Behavior: Behavior normal.         Thought Content: Thought content normal.       Past Medical History:   Diagnosis Date   • Atrial fibrillation (HCC)    • Atrial flutter (HCC)    • Chronic anticoagulation    • Polycystic kidney, unspecified type    • Pure hypercholesterolemia    • Sleep apnea     on oxygen, unable to tolerate CPAP.   • Unspecified essential hypertension    • Unspecified sleep apnea      Social History     Socioeconomic History   • Marital status:      Spouse name: Not on file   • Number of children: Not on file   • Years of education: Not on file   •  Highest education level: Not on file   Occupational History   • Not on file   Tobacco Use   • Smoking status: Current Every Day Smoker     Packs/day: 1.00     Types: Cigars   • Smokeless tobacco: Never Used   Substance and Sexual Activity   • Alcohol use: Not Currently     Comment: moderate   • Drug use: No   • Sexual activity: Not on file   Other Topics Concern   • Not on file   Social History Narrative   • Not on file     Social Determinants of Health     Financial Resource Strain:    • Difficulty of Paying Living Expenses:    Food Insecurity:    • Worried About Running Out of Food in the Last Year:    • Ran Out of Food in the Last Year:    Transportation Needs:    • Lack of Transportation (Medical):    • Lack of Transportation (Non-Medical):    Physical Activity:    • Days of Exercise per Week:    • Minutes of Exercise per Session:    Stress:    • Feeling of Stress :    Social Connections:    • Frequency of Communication with Friends and Family:    • Frequency of Social Gatherings with Friends and Family:    • Attends Rastafari Services:    • Active Member of Clubs or Organizations:    • Attends Club or Organization Meetings:    • Marital Status:    Intimate Partner Violence:    • Fear of Current or Ex-Partner:    • Emotionally Abused:    • Physically Abused:    • Sexually Abused:      Family History   Problem Relation Age of Onset   • Heart Disease Neg Hx      Recent Labs     09/22/20  0859   SODIUM 141   POTASSIUM 4.3   CHLORIDE 104   CO2 22   BUN 35*   CREATININE 2.62*       Recent creatinine from February 2021 was 2.0 mg/dL          Assessment/Plan:        1. Stage 3b chronic kidney disease  Secondary to polycystic kidney disease  Stable  No uremic symptoms  Renal dose of medication  Avoid nephrotoxins  Continue same medication regimen      2. Polycystic kidney  Continue ARB  Get the old records including the recent renal ultrasound    3. Essential hypertension  Controlled  Continue same medication  regimen  Continue low-sodium diet    4.  Shortness of breath  Most likely secondary to high salt intake  I advised the patient to be on low-sodium diet  Continue to use the Lasix and will use an extra dose on as-needed basis    Over 50% of this 45 minute visit was spent on counseling and coordination of care regarding diet, side effects, and complication.

## 2021-04-23 ENCOUNTER — PRE-ADMISSION TESTING (OUTPATIENT)
Dept: ADMISSIONS | Facility: MEDICAL CENTER | Age: 71
End: 2021-04-23
Attending: INTERNAL MEDICINE
Payer: MEDICARE

## 2021-04-23 DIAGNOSIS — Z01.812 PRE-OPERATIVE LABORATORY EXAMINATION: ICD-10-CM

## 2021-04-23 LAB
SARS-COV-2 RNA RESP QL NAA+PROBE: NOTDETECTED
SPECIMEN SOURCE: NORMAL

## 2021-04-23 PROCEDURE — U0005 INFEC AGEN DETEC AMPLI PROBE: HCPCS

## 2021-04-23 PROCEDURE — U0003 INFECTIOUS AGENT DETECTION BY NUCLEIC ACID (DNA OR RNA); SEVERE ACUTE RESPIRATORY SYNDROME CORONAVIRUS 2 (SARS-COV-2) (CORONAVIRUS DISEASE [COVID-19]), AMPLIFIED PROBE TECHNIQUE, MAKING USE OF HIGH THROUGHPUT TECHNOLOGIES AS DESCRIBED BY CMS-2020-01-R: HCPCS

## 2021-04-23 PROCEDURE — C9803 HOPD COVID-19 SPEC COLLECT: HCPCS

## 2021-04-27 ENCOUNTER — HOSPITAL ENCOUNTER (OUTPATIENT)
Facility: MEDICAL CENTER | Age: 71
End: 2021-04-27
Attending: INTERNAL MEDICINE | Admitting: INTERNAL MEDICINE
Payer: MEDICARE

## 2021-04-27 ENCOUNTER — APPOINTMENT (OUTPATIENT)
Dept: CARDIOLOGY | Facility: MEDICAL CENTER | Age: 71
End: 2021-04-27
Attending: INTERNAL MEDICINE
Payer: MEDICARE

## 2021-04-27 ENCOUNTER — ANESTHESIA (OUTPATIENT)
Dept: CARDIOLOGY | Facility: MEDICAL CENTER | Age: 71
End: 2021-04-27
Payer: MEDICARE

## 2021-04-27 ENCOUNTER — ANESTHESIA EVENT (OUTPATIENT)
Dept: CARDIOLOGY | Facility: MEDICAL CENTER | Age: 71
End: 2021-04-27
Payer: MEDICARE

## 2021-04-27 VITALS
SYSTOLIC BLOOD PRESSURE: 161 MMHG | DIASTOLIC BLOOD PRESSURE: 93 MMHG | WEIGHT: 233.69 LBS | RESPIRATION RATE: 18 BRPM | BODY MASS INDEX: 33.46 KG/M2 | OXYGEN SATURATION: 95 % | TEMPERATURE: 97.6 F | HEIGHT: 70 IN | HEART RATE: 88 BPM

## 2021-04-27 DIAGNOSIS — I34.0 MITRAL VALVE INSUFFICIENCY, UNSPECIFIED ETIOLOGY: ICD-10-CM

## 2021-04-27 LAB
ANION GAP SERPL CALC-SCNC: 6 MMOL/L (ref 7–16)
BUN SERPL-MCNC: 30 MG/DL (ref 8–22)
CALCIUM SERPL-MCNC: 9 MG/DL (ref 8.5–10.5)
CHLORIDE SERPL-SCNC: 104 MMOL/L (ref 96–112)
CO2 SERPL-SCNC: 25 MMOL/L (ref 20–33)
CREAT SERPL-MCNC: 2.03 MG/DL (ref 0.5–1.4)
EKG IMPRESSION: NORMAL
ERYTHROCYTE [DISTWIDTH] IN BLOOD BY AUTOMATED COUNT: 52.2 FL (ref 35.9–50)
GLUCOSE SERPL-MCNC: 100 MG/DL (ref 65–99)
HCT VFR BLD AUTO: 44.3 % (ref 42–52)
HGB BLD-MCNC: 14.4 G/DL (ref 14–18)
INR PPP: 1.34 (ref 0.87–1.13)
MCH RBC QN AUTO: 31.1 PG (ref 27–33)
MCHC RBC AUTO-ENTMCNC: 32.5 G/DL (ref 33.7–35.3)
MCV RBC AUTO: 95.7 FL (ref 81.4–97.8)
PLATELET # BLD AUTO: 220 K/UL (ref 164–446)
PMV BLD AUTO: 11.2 FL (ref 9–12.9)
POTASSIUM SERPL-SCNC: 3.6 MMOL/L (ref 3.6–5.5)
PROTHROMBIN TIME: 17 SEC (ref 12–14.6)
RBC # BLD AUTO: 4.63 M/UL (ref 4.7–6.1)
SODIUM SERPL-SCNC: 135 MMOL/L (ref 135–145)
WBC # BLD AUTO: 9.7 K/UL (ref 4.8–10.8)

## 2021-04-27 PROCEDURE — 700111 HCHG RX REV CODE 636 W/ 250 OVERRIDE (IP): Performed by: ANESTHESIOLOGY

## 2021-04-27 PROCEDURE — 93312 ECHO TRANSESOPHAGEAL: CPT | Mod: 26 | Performed by: INTERNAL MEDICINE

## 2021-04-27 PROCEDURE — 85610 PROTHROMBIN TIME: CPT

## 2021-04-27 PROCEDURE — 93010 ELECTROCARDIOGRAM REPORT: CPT | Mod: 59 | Performed by: INTERNAL MEDICINE

## 2021-04-27 PROCEDURE — 85027 COMPLETE CBC AUTOMATED: CPT

## 2021-04-27 PROCEDURE — 160002 HCHG RECOVERY MINUTES (STAT)

## 2021-04-27 PROCEDURE — A9270 NON-COVERED ITEM OR SERVICE: HCPCS | Performed by: INTERNAL MEDICINE

## 2021-04-27 PROCEDURE — 93005 ELECTROCARDIOGRAM TRACING: CPT | Performed by: INTERNAL MEDICINE

## 2021-04-27 PROCEDURE — 93325 DOPPLER ECHO COLOR FLOW MAPG: CPT

## 2021-04-27 PROCEDURE — 700101 HCHG RX REV CODE 250: Performed by: INTERNAL MEDICINE

## 2021-04-27 PROCEDURE — 93320 DOPPLER ECHO COMPLETE: CPT | Mod: 26 | Performed by: INTERNAL MEDICINE

## 2021-04-27 PROCEDURE — 80048 BASIC METABOLIC PNL TOTAL CA: CPT

## 2021-04-27 PROCEDURE — 700105 HCHG RX REV CODE 258: Performed by: INTERNAL MEDICINE

## 2021-04-27 RX ORDER — SODIUM CHLORIDE, SODIUM LACTATE, POTASSIUM CHLORIDE, CALCIUM CHLORIDE 600; 310; 30; 20 MG/100ML; MG/100ML; MG/100ML; MG/100ML
INJECTION, SOLUTION INTRAVENOUS CONTINUOUS
Status: DISCONTINUED | OUTPATIENT
Start: 2021-04-27 | End: 2021-04-27 | Stop reason: HOSPADM

## 2021-04-27 RX ORDER — HYDRALAZINE HYDROCHLORIDE 20 MG/ML
5 INJECTION INTRAMUSCULAR; INTRAVENOUS
Status: DISCONTINUED | OUTPATIENT
Start: 2021-04-27 | End: 2021-04-27 | Stop reason: HOSPADM

## 2021-04-27 RX ORDER — HALOPERIDOL 5 MG/ML
1 INJECTION INTRAMUSCULAR
Status: DISCONTINUED | OUTPATIENT
Start: 2021-04-27 | End: 2021-04-27 | Stop reason: HOSPADM

## 2021-04-27 RX ORDER — LABETALOL HYDROCHLORIDE 5 MG/ML
5 INJECTION, SOLUTION INTRAVENOUS
Status: DISCONTINUED | OUTPATIENT
Start: 2021-04-27 | End: 2021-04-27 | Stop reason: HOSPADM

## 2021-04-27 RX ORDER — DIPHENHYDRAMINE HYDROCHLORIDE 50 MG/ML
12.5 INJECTION INTRAMUSCULAR; INTRAVENOUS
Status: DISCONTINUED | OUTPATIENT
Start: 2021-04-27 | End: 2021-04-27 | Stop reason: HOSPADM

## 2021-04-27 RX ADMIN — PROPOFOL 30 MG: 10 INJECTION, EMULSION INTRAVENOUS at 10:08

## 2021-04-27 RX ADMIN — SODIUM CHLORIDE, POTASSIUM CHLORIDE, SODIUM LACTATE AND CALCIUM CHLORIDE: 600; 310; 30; 20 INJECTION, SOLUTION INTRAVENOUS at 09:00

## 2021-04-27 RX ADMIN — PROPOFOL 30 MG: 10 INJECTION, EMULSION INTRAVENOUS at 10:01

## 2021-04-27 RX ADMIN — POVIDONE IODINE 15 ML: 100 SOLUTION TOPICAL at 09:00

## 2021-04-27 RX ADMIN — PROPOFOL 50 MG: 10 INJECTION, EMULSION INTRAVENOUS at 09:56

## 2021-04-27 RX ADMIN — HYDRALAZINE HYDROCHLORIDE 5 MG: 20 INJECTION INTRAMUSCULAR; INTRAVENOUS at 11:21

## 2021-04-27 NOTE — ANESTHESIA TIME REPORT
Anesthesia Start and Stop Event Times     Date Time Event    4/27/2021 0935 Ready for Procedure     0952 Anesthesia Start     1027 Anesthesia Stop        Responsible Staff  04/27/21    Name Role Begin End    Power Rios D.O. Anesth 0952 1027        Preop Diagnosis (Free Text):  Pre-op Diagnosis             Preop Diagnosis (Codes):    Post op Diagnosis  Mitral valve insufficiency      Premium Reason  Non-Premium    Comments:

## 2021-04-27 NOTE — OR NURSING
1028 Pt into PPU with procedural RN.  AAOx4.  VSS.  Denies chest pain and nausea.    1037 Family member called.  Update given and  arranged.    1115 Pt blood pressure elevated.  Medication given.  See MAR    1145 IV d/c'd.  Pt given discharge instructions.    1155 Pt wheeled out by RN.  All belongings with pt.

## 2021-04-27 NOTE — ANESTHESIA PREPROCEDURE EVALUATION
Relevant Problems   ANESTHESIA   (+) Sleep apnea      CARDIAC   (+) Atrial fibrillation, chronic (HCC)   (+) Essential hypertension   (+) Mitral regurgitation         (+) CKD (chronic kidney disease)   (+) Polycystic kidney      Other   (+) Arthritis       Physical Exam    Airway   Mallampati: II  TM distance: >3 FB  Neck ROM: full       Cardiovascular - normal exam  Rhythm: regular  Rate: normal  (-) murmur     Dental - normal exam           Pulmonary - normal exam  Breath sounds clear to auscultation     Abdominal    Neurological - normal exam                 Anesthesia Plan    ASA 3   ASA physical status 3 criteria: moderate reduction of ejection fraction    Plan - MAC       [place] Planned        Induction: intravenous    Postoperative Plan: Postoperative administration of opioids is intended.    Pertinent diagnostic labs and testing reviewed    Informed Consent:    Anesthetic plan and risks discussed with patient.    Use of blood products discussed with: patient whom consented to blood products.

## 2021-04-27 NOTE — OR NURSING
0900: VSS. Patient oriented in PRE-OP. IV started and  procedure verified. Identification band verified and in place. Procedure explained and questions answered. Allergies and NPO status verified. Medical reconciliation completed. Belongings secured and placed at back [facility].

## 2021-04-27 NOTE — H&P
History:  Primary Diagnosis: Chronic Afib, HFrEF, Mitral Regurgitation, Chronic anticoagulation, HTN, CKD3b     HPI:  Zander Houghoney, patient of Dr. Calderón with significant history of hypertension, CKD 3B, pulmonary hypertension, DLD, HOWARD on CPAP, Obesity, and BLE d/t diltiazem. Recent cardiac imaging showed     Echo (12/8/2020): Compared to the images of the study done 9-2-2020 mitral regurgitation   appears more severe.Mildly reduced left ventricular systolic function.  Left ventricular ejection fraction is visually estimated to be 45-50%.  Aortic sclerosis without stenosis.  Mild aortic insufficiency.  Severe mitral regurgitation.  Right heart pressures are consistent with moderate pulmonary   hypertension at 50 mmHg.  Normal right ventricular size and systolic function.  Severe biatrial enlargement.  Rhythm is atrial fibrillation.  -Proceed with transesophageal echocardiography    Currently patient denies chest pain, or palpitations. Patient does complain of symptoms of SIM NYHA class II-III. Ascites, and baseline BLE edema. Patient noting symptoms improve with taking additional lasix dose.    Medical history:   Past Medical History:   Diagnosis Date   • Arthritis 04/23/2021    Osteo- Fingers/Hands   • Atrial fibrillation (HCC)    • Atrial flutter (HCC)    • Breath shortness 04/23/2021    In the past, is a current problem frequently   • Chronic anticoagulation    • Heart burn 04/23/2021    GERD   • Polycystic kidney, unspecified type    • Pure hypercholesterolemia    • Sleep apnea     on oxygen, unable to tolerate CPAP.   • Unspecified essential hypertension    • Unspecified sleep apnea        Surgical history:   Past Surgical History:   Procedure Laterality Date   • TONSILLECTOMY  1982   • APPENDECTOMY  1966   • APPENDECTOMY     • OTHER      Cardiac Cath   • OTHER      Kidney Surgery   • OTHER      Nose Surgery   • OTHER      Tonsillectomy with Adenoidectomy       Social history:   Social History     Tobacco  Use   Smoking Status Current Every Day Smoker   • Packs/day: 1.00   • Types: Cigars   Smokeless Tobacco Never Used      Social History     Substance and Sexual Activity   Alcohol Use Not Currently    Comment: moderate. 4/23/21: 1-2/month      Social History     Substance and Sexual Activity   Drug Use No        Family history:   Family History   Problem Relation Age of Onset   • Heart Disease Neg Hx        Allergies: No Known Allergies    Home medications:   Current Outpatient Medications:   •  furosemide (LASIX) 80 MG Tab, TAKE ONE TABLET BY MOUTH EVERY DAY, Disp: 90 tablet, Rfl: 3  •  losartan (COZAAR) 100 MG Tab, TAKE ONE TABLET BY MOUTH EVERY DAY, Disp: 90 tablet, Rfl: 3  •  zolpidem (AMBIEN CR) 12.5 MG CR tablet, , Disp: , Rfl:   •  carvedilol (COREG) 12.5 MG Tab, Take 1 Tab by mouth 2 times a day, with meals., Disp: 90 Tab, Rfl: 3  •  omeprazole (PRILOSEC) 40 MG delayed-release capsule, omeprazole 40 mg capsule,delayed release  Take 1 capsule every day by oral route., Disp: , Rfl:   •  warfarin (COUMADIN) 5 MG Tab, Coumadin 5 mg tablet  Take 1 tablet every day by oral route., Disp: , Rfl:   •  VENTOLIN  (90 Base) MCG/ACT Aero Soln inhalation aerosol, Inhale 1-2 Puffs by mouth as needed., Disp: , Rfl:   •  omeprazole (PRILOSEC) 20 MG CPDR, Take 20 mg by mouth 2 times a day., Disp: , Rfl:   •  zolpidem (AMBIEN) 10 MG TABS, Take 10 mg by mouth at bedtime as needed., Disp: , Rfl:     Review of Systems:  Negative except for noted above    Physical Examination:  Gen: Patient alert, NAD, conversant  HEENT: PERRLA, EOMI  Lungs: Breath sounds clear.  No wheezing, rhonchi, or rales.  CV: RRR, heart sounds +SM, no RG, no JVD  SKIN: Warm, dry, +1 non-pitting edema  Ext: peripheral pulses 2+.    Impression:  Further evaluation of valvular dysfunction    Plan:  Plan for [place]     The risks, benefits, and alternatives to transesophageal echocardiogram with IV sedation were discussed with the patient in specific detail,  including oropharyngeal and esophageal traumas including hoarseness and dysphagia after the procedure. Rare cases demonstrating serious or fatal complications associated with transesophageal echocardiogram have been reported in the adult population, including cardiac, pulmonary and bleeding complications in less than 1% of people. Patients with an identified intracardiac thrombus are at increased risk for embolic events and this appears to be reduced with anticoagulant therapy.     The patient verbalized understanding of these potential complications and wishes to proceed with this procedure. The risks/benefits of the procedure will be further discussed with the consenting physician performing the procedure.

## 2021-04-27 NOTE — ANESTHESIA POSTPROCEDURE EVALUATION
Patient: Zander Stewart    Procedure Summary     Date: 04/27/21 Room / Location: [facility] - ECHOCARDIOLOGY [facility]    Anesthesia Start: 0952 Anesthesia Stop: 1027    Procedure: EC-[place] W/O CONT Diagnosis:       Mitral valve insufficiency, unspecified etiology      Nonrheumatic mitral (valve) insufficiency    Scheduled Providers: Eugene Calderón M.D.; Power Rios D.O. Responsible Provider: Power Rios D.O.    Anesthesia Type: MAC ASA Status: 3          Final Anesthesia Type: MAC  Last vitals  BP   Blood Pressure : 158/96    Temp   36.2 °C (97.2 °F)    Pulse   84   Resp   20    SpO2   95 %      Anesthesia Post Evaluation    Patient location during evaluation: PACU  Patient participation: complete - patient participated  Level of consciousness: awake and alert    Airway patency: patent  Anesthetic complications: no  Cardiovascular status: hemodynamically stable  Respiratory status: acceptable  Hydration status: euvolemic    PONV: none          No complications documented.

## 2021-06-09 ENCOUNTER — HOSPITAL ENCOUNTER (OUTPATIENT)
Dept: LAB | Facility: MEDICAL CENTER | Age: 71
End: 2021-06-09
Attending: INTERNAL MEDICINE
Payer: MEDICARE

## 2021-06-09 DIAGNOSIS — I50.20 ACC/AHA STAGE C HEART FAILURE WITH REDUCED EJECTION FRACTION (HCC): ICD-10-CM

## 2021-06-09 LAB
ALBUMIN SERPL BCP-MCNC: 4.2 G/DL (ref 3.2–4.9)
ALBUMIN/GLOB SERPL: 1.6 G/DL
ALP SERPL-CCNC: 126 U/L (ref 30–99)
ALT SERPL-CCNC: 13 U/L (ref 2–50)
ANION GAP SERPL CALC-SCNC: 11 MMOL/L (ref 7–16)
AST SERPL-CCNC: 18 U/L (ref 12–45)
BILIRUB SERPL-MCNC: 1.8 MG/DL (ref 0.1–1.5)
BUN SERPL-MCNC: 28 MG/DL (ref 8–22)
CALCIUM SERPL-MCNC: 9.1 MG/DL (ref 8.5–10.5)
CHLORIDE SERPL-SCNC: 103 MMOL/L (ref 96–112)
CO2 SERPL-SCNC: 26 MMOL/L (ref 20–33)
CREAT SERPL-MCNC: 2.02 MG/DL (ref 0.5–1.4)
ERYTHROCYTE [DISTWIDTH] IN BLOOD BY AUTOMATED COUNT: 58 FL (ref 35.9–50)
FASTING STATUS PATIENT QL REPORTED: NORMAL
GLOBULIN SER CALC-MCNC: 2.6 G/DL (ref 1.9–3.5)
GLUCOSE SERPL-MCNC: 106 MG/DL (ref 65–99)
HCT VFR BLD AUTO: 48.9 % (ref 42–52)
HGB BLD-MCNC: 15.4 G/DL (ref 14–18)
MCH RBC QN AUTO: 31.2 PG (ref 27–33)
MCHC RBC AUTO-ENTMCNC: 31.5 G/DL (ref 33.7–35.3)
MCV RBC AUTO: 99.2 FL (ref 81.4–97.8)
NT-PROBNP SERPL IA-MCNC: 6087 PG/ML (ref 0–125)
PLATELET # BLD AUTO: 248 K/UL (ref 164–446)
PMV BLD AUTO: 11.5 FL (ref 9–12.9)
POTASSIUM SERPL-SCNC: 3.8 MMOL/L (ref 3.6–5.5)
PROT SERPL-MCNC: 6.8 G/DL (ref 6–8.2)
RBC # BLD AUTO: 4.93 M/UL (ref 4.7–6.1)
SODIUM SERPL-SCNC: 140 MMOL/L (ref 135–145)
WBC # BLD AUTO: 9 K/UL (ref 4.8–10.8)

## 2021-06-09 PROCEDURE — 83880 ASSAY OF NATRIURETIC PEPTIDE: CPT

## 2021-06-09 PROCEDURE — 80053 COMPREHEN METABOLIC PANEL: CPT

## 2021-06-09 PROCEDURE — 36415 COLL VENOUS BLD VENIPUNCTURE: CPT

## 2021-06-09 PROCEDURE — 85027 COMPLETE CBC AUTOMATED: CPT

## 2021-06-16 ENCOUNTER — OFFICE VISIT (OUTPATIENT)
Dept: CARDIOLOGY | Facility: MEDICAL CENTER | Age: 71
End: 2021-06-16
Payer: MEDICARE

## 2021-06-16 VITALS
BODY MASS INDEX: 33.64 KG/M2 | HEIGHT: 70 IN | DIASTOLIC BLOOD PRESSURE: 72 MMHG | SYSTOLIC BLOOD PRESSURE: 140 MMHG | OXYGEN SATURATION: 92 % | RESPIRATION RATE: 14 BRPM | WEIGHT: 235 LBS | HEART RATE: 88 BPM

## 2021-06-16 DIAGNOSIS — I50.20 ACC/AHA STAGE C HEART FAILURE WITH REDUCED EJECTION FRACTION (HCC): ICD-10-CM

## 2021-06-16 DIAGNOSIS — Z79.01 CHRONIC ANTICOAGULATION: Chronic | ICD-10-CM

## 2021-06-16 DIAGNOSIS — I48.20 ATRIAL FIBRILLATION, CHRONIC (HCC): ICD-10-CM

## 2021-06-16 DIAGNOSIS — I10 ESSENTIAL HYPERTENSION: Chronic | ICD-10-CM

## 2021-06-16 DIAGNOSIS — I34.0 MITRAL VALVE INSUFFICIENCY, UNSPECIFIED ETIOLOGY: ICD-10-CM

## 2021-06-16 DIAGNOSIS — G47.33 OSA (OBSTRUCTIVE SLEEP APNEA): ICD-10-CM

## 2021-06-16 DIAGNOSIS — E78.00 PURE HYPERCHOLESTEROLEMIA: Chronic | ICD-10-CM

## 2021-06-16 PROCEDURE — 99214 OFFICE O/P EST MOD 30 MIN: CPT | Performed by: INTERNAL MEDICINE

## 2021-06-16 RX ORDER — CIPROFLOXACIN HYDROCHLORIDE 3.5 MG/ML
SOLUTION/ DROPS TOPICAL
COMMUNITY
Start: 2021-06-04 | End: 2021-11-10

## 2021-06-16 ASSESSMENT — ENCOUNTER SYMPTOMS
LOSS OF CONSCIOUSNESS: 0
DIZZINESS: 0
PALPITATIONS: 0
SHORTNESS OF BREATH: 0
MYALGIAS: 0
COUGH: 0
BRUISES/BLEEDS EASILY: 0

## 2021-06-16 ASSESSMENT — FIBROSIS 4 INDEX: FIB4 SCORE: 1.43

## 2021-06-16 NOTE — PROGRESS NOTES
Chief Complaint   Patient presents with   • Atrial Fibrillation     Dx: Atrial fibrillation, chronic (HCC)   • Congestive Heart Failure     Dx: ACC/AHA stage C heart failure with reduced ejection fraction (HCC)   • Hyperlipidemia     Dx: Pure hypercholesterolemia       Subjective:   Zander Stewart is a 71 y.o. male who presents today for follow-up evaluation of chronic atrial fibrillation, heart failure, chronic anticoagulation, hypertension and hyperlipidemia with a history of CKD, polycystic kidney disease, obstructive sleep apnea with intolerance to CPAP therapy.     Since 4/27/2021 [place] procedure the patient still has episodes of mild shortness of breath.  Generally takes Lasix 40 mg daily and on occasion takes 80 mg.  Weight has been stable.  BP generally runs 130s-140s systolic.  Reports H/O HOWARD diagnosed 20 years ago probably with [facility] but did not tolerate CPAP.  Lost to follow-up.    Previously followed by Dr. Drake Westbrook, electrophysiologist and Dr. Scot Hernández, retired cardiologist     Past Medical History:   Diagnosis Date   • Arthritis 04/23/2021    Osteo- Fingers/Hands   • Atrial fibrillation (HCC)    • Atrial flutter (HCC)    • Breath shortness 04/23/2021    In the past, is a current problem frequently   • Chronic anticoagulation    • Heart burn 04/23/2021    GERD   • Polycystic kidney, unspecified type    • Pure hypercholesterolemia    • Sleep apnea     on oxygen, unable to tolerate CPAP.   • Unspecified essential hypertension    • Unspecified sleep apnea      Past Surgical History:   Procedure Laterality Date   • TONSILLECTOMY  1982   • APPENDECTOMY  1966   • APPENDECTOMY     • OTHER      Cardiac Cath   • OTHER      Kidney Surgery   • OTHER      Nose Surgery   • OTHER      Tonsillectomy with Adenoidectomy     Family History   Problem Relation Age of Onset   • Heart Disease Neg Hx      Social History     Socioeconomic History   • Marital status:      Spouse name:  Not on file   • Number of children: Not on file   • Years of education: Not on file   • Highest education level: Not on file   Occupational History   • Not on file   Tobacco Use   • Smoking status: Current Every Day Smoker     Packs/day: 1.00     Types: Cigars   • Smokeless tobacco: Never Used   Vaping Use   • Vaping Use: Never used   Substance and Sexual Activity   • Alcohol use: Not Currently     Comment: moderate. 4/23/21: 1-2/month   • Drug use: No   • Sexual activity: Not on file   Other Topics Concern   • Not on file   Social History Narrative   • Not on file     Social Determinants of Health     Financial Resource Strain:    • Difficulty of Paying Living Expenses:    Food Insecurity:    • Worried About Running Out of Food in the Last Year:    • Ran Out of Food in the Last Year:    Transportation Needs:    • Lack of Transportation (Medical):    • Lack of Transportation (Non-Medical):    Physical Activity:    • Days of Exercise per Week:    • Minutes of Exercise per Session:    Stress:    • Feeling of Stress :    Social Connections:    • Frequency of Communication with Friends and Family:    • Frequency of Social Gatherings with Friends and Family:    • Attends [employer] Services:    • Active Member of Clubs or Organizations:    • Attends Club or Organization Meetings:    • Marital Status:    Intimate Partner Violence:    • Fear of Current or Ex-Partner:    • Emotionally Abused:    • Physically Abused:    • Sexually Abused:      No Known Allergies  Outpatient Encounter Medications as of 6/16/2021   Medication Sig Dispense Refill   • ciprofloxacin (CILOXIN) 0.3 % Solution      • furosemide (LASIX) 80 MG Tab TAKE ONE TABLET BY MOUTH EVERY DAY 90 tablet 3   • losartan (COZAAR) 100 MG Tab TAKE ONE TABLET BY MOUTH EVERY DAY 90 tablet 3   • carvedilol (COREG) 12.5 MG Tab Take 1 Tab by mouth 2 times a day, with meals. 90 Tab 3   • warfarin (COUMADIN) 5 MG Tab Coumadin 5 mg tablet   Take 1 tablet every day by oral  "route.     • VENTOLIN  (90 Base) MCG/ACT Aero Soln inhalation aerosol Inhale 1-2 Puffs by mouth as needed.     • omeprazole (PRILOSEC) 20 MG CPDR Take 20 mg by mouth 2 times a day.     • zolpidem (AMBIEN) 10 MG TABS Take 10 mg by mouth at bedtime as needed.     • [DISCONTINUED] zolpidem (AMBIEN CR) 12.5 MG CR tablet  (Patient not taking: Reported on 6/16/2021)     • [DISCONTINUED] omeprazole (PRILOSEC) 40 MG delayed-release capsule omeprazole 40 mg capsule,delayed release   Take 1 capsule every day by oral route. (Patient not taking: Reported on 6/16/2021)       No facility-administered encounter medications on file as of 6/16/2021.     Review of Systems   Respiratory: Negative for cough and shortness of breath.    Cardiovascular: Negative for chest pain and palpitations.   Musculoskeletal: Negative for myalgias.   Neurological: Negative for dizziness and loss of consciousness.   Endo/Heme/Allergies: Does not bruise/bleed easily.        Objective:   /72 (BP Location: Left arm, Patient Position: Sitting, BP Cuff Size: Adult)   Pulse 88   Resp 14   Ht 1.778 m (5' 10\")   Wt 107 kg (235 lb)   SpO2 92%   BMI 33.72 kg/m²     Physical Exam   Constitutional: He is oriented to person, place, and time. He appears well-developed. No distress.   Eyes: Pupils are equal, round, and reactive to light. Conjunctivae are normal.   Neck: No JVD present.   Normal at 90 degrees.   Cardiovascular: Normal rate. An irregularly irregular rhythm present. Exam reveals no gallop and no friction rub.   Murmur heard.  Pulses:       Carotid pulses are 2+ on the right side and 2+ on the left side.  Pulmonary/Chest: Effort normal and breath sounds normal. No accessory muscle usage. No respiratory distress. He has no wheezes. He has no rales.   Abdominal: Soft. He exhibits no distension and no mass. There is no abdominal tenderness.   Markedly protuberant possible ascites.   Musculoskeletal:      Cervical back: Normal range of " motion and neck supple.      Comments: Chronic dermatologic changes lower extremities.  No pitting edema.   Neurological: He is alert and oriented to person, place, and time.   Skin: Skin is warm and dry. No rash noted. Nails show no clubbing.   Psychiatric: His behavior is normal.   Vitals reviewed.    ECHOCARDIOGRAM 06/06/2013  Left ejection fraction 55-60%.  Mild concentric left ventricular hypertrophy.  Mild mitral regurgitation.  Severe left atrial dilatation.  Moderate right atrial dilatation.    ECHOCARDIOGRAM 09/02/2020  No prior study is available for comparison.   Normal left ventricular chamber size.  Mildly reduced left ventricular systolic function.  Left ventricular ejection fraction is visually estimated to be 45-50%.  Aortic sclerosis without stenosis.  Mild mitral regurgitation.  Normal right ventricular size and systolic function.  Right heart pressures are normal.  Severely dilated left atrium.  Rythym is atrial fibrillation.    TRANSESOPHAGEAL ECHOCARDIOGRAM 4/27/2021.  Mild to moderately reduced left ventricular systolic function.  Left ventricular ejection fraction is visually estimated to be 40-45%.  Mild aortic insufficiency.  Mild degenerative mitral valve leaftets.  Moderate mitral regurgitation, central with 2 jets.    EKG 04/25/2013 atrial fibrillation, rate 83.    Lab:  Creatinine 2.10 GFR 31 8/12/2020  Creatinine 2.60 GFR 27 9/22/2020  Creatinine 2.07 GFR 32 11/24/2020    Assessment:     1. ACC/AHA stage C heart failure with reduced ejection fraction (HCC)     2. Atrial fibrillation, chronic (HCC)     3. Essential hypertension     4. Mitral valve insufficiency, unspecified etiology     5. Pure hypercholesterolemia     6. Chronic anticoagulation     7. HOWARD (obstructive sleep apnea)  REFERRAL TO PULMONARY AND SLEEP MEDICINE     Medical Decision Making:  Today's Assessment / Status / Plan:     Assessment  1.  Atrial fibrillation. Chronic.   2.  Systolic CHF, EF 45-50%.  3.  Mitral  regurgitation.  Moderate.  4.  Anticoagulation.  On warfarin.  5.  Pulmonary hypertension.     6.  Hypertension.  7.  Dyslipidemia.  8.  HOWARD on CPAP.  9.  CKD.  10.  Obesity.  11.  LE edema aggravated by diltiazem.  12.  H/O HOWARD, diagnosed 20 years ago, currently untreated.    Recommendation and recommendation  1.  The patient appears still to be slightly volume overloaded.  2.  Change Lasix regiment to 80 mg alternating with 40 mg QOD.  3.  Instructed patient to maintain daily BP log if BP remains 130s-140s will increase Coreg to 25 mg BID.  4.  Again reviewed results of [place].  5.  Will notify the office in 2 weeks regarding weight, SOB symptoms and BP.  6.  Refer to pulmonary for sleep study for HOWARD reevaluation.  7.  Otherwise stable with regards to mitral regurgitation, anticoagulation.  8.  RTC 6 months.

## 2021-09-21 DIAGNOSIS — I10 ESSENTIAL HYPERTENSION: ICD-10-CM

## 2021-09-21 DIAGNOSIS — I48.20 ATRIAL FIBRILLATION, CHRONIC (HCC): ICD-10-CM

## 2021-09-22 RX ORDER — CARVEDILOL 12.5 MG/1
12.5 TABLET ORAL 2 TIMES DAILY WITH MEALS
Qty: 180 TABLET | Refills: 3 | Status: SHIPPED | OUTPATIENT
Start: 2021-09-22 | End: 2022-02-15 | Stop reason: SDUPTHER

## 2021-09-27 ENCOUNTER — TELEPHONE (OUTPATIENT)
Dept: NEPHROLOGY | Facility: MEDICAL CENTER | Age: 71
End: 2021-09-27

## 2021-09-27 NOTE — TELEPHONE ENCOUNTER
Hi Dr Najjar,   Patient is scheduled to see you 10/20/21 and CBC order expires on 09/30/21, could you please re-order test.    Thank you !

## 2021-09-28 DIAGNOSIS — N18.32 STAGE 3B CHRONIC KIDNEY DISEASE: ICD-10-CM

## 2021-09-30 ENCOUNTER — HOSPITAL ENCOUNTER (OUTPATIENT)
Dept: RADIOLOGY | Facility: MEDICAL CENTER | Age: 71
End: 2021-09-30
Attending: INTERNAL MEDICINE
Payer: MEDICARE

## 2021-09-30 DIAGNOSIS — M79.642 LEFT HAND PAIN: ICD-10-CM

## 2021-09-30 DIAGNOSIS — M25.562 LEFT KNEE PAIN, UNSPECIFIED CHRONICITY: ICD-10-CM

## 2021-09-30 DIAGNOSIS — M79.641 RIGHT HAND PAIN: ICD-10-CM

## 2021-09-30 DIAGNOSIS — M25.561 RIGHT KNEE PAIN, UNSPECIFIED CHRONICITY: ICD-10-CM

## 2021-09-30 PROCEDURE — 77077 JOINT SURVEY SINGLE VIEW: CPT

## 2021-09-30 PROCEDURE — 73560 X-RAY EXAM OF KNEE 1 OR 2: CPT | Mod: RT

## 2021-09-30 PROCEDURE — 73565 X-RAY EXAM OF KNEES: CPT

## 2021-09-30 PROCEDURE — 73560 X-RAY EXAM OF KNEE 1 OR 2: CPT | Mod: LT

## 2021-10-11 ENCOUNTER — HOSPITAL ENCOUNTER (OUTPATIENT)
Dept: LAB | Facility: MEDICAL CENTER | Age: 71
End: 2021-10-11
Attending: INTERNAL MEDICINE
Payer: MEDICARE

## 2021-10-11 DIAGNOSIS — N18.32 STAGE 3B CHRONIC KIDNEY DISEASE: ICD-10-CM

## 2021-10-11 LAB
ANION GAP SERPL CALC-SCNC: 12 MMOL/L (ref 7–16)
BUN SERPL-MCNC: 29 MG/DL (ref 8–22)
CALCIUM SERPL-MCNC: 9.4 MG/DL (ref 8.5–10.5)
CHLORIDE SERPL-SCNC: 107 MMOL/L (ref 96–112)
CO2 SERPL-SCNC: 24 MMOL/L (ref 20–33)
CREAT SERPL-MCNC: 2.31 MG/DL (ref 0.5–1.4)
CREAT UR-MCNC: 35.48 MG/DL
ERYTHROCYTE [DISTWIDTH] IN BLOOD BY AUTOMATED COUNT: 53.9 FL (ref 35.9–50)
FASTING STATUS PATIENT QL REPORTED: NORMAL
GLUCOSE SERPL-MCNC: 88 MG/DL (ref 65–99)
HCT VFR BLD AUTO: 45 % (ref 42–52)
HGB BLD-MCNC: 14.7 G/DL (ref 14–18)
MCH RBC QN AUTO: 30.5 PG (ref 27–33)
MCHC RBC AUTO-ENTMCNC: 32.7 G/DL (ref 33.7–35.3)
MCV RBC AUTO: 93.4 FL (ref 81.4–97.8)
MICROALBUMIN UR-MCNC: 28.4 MG/DL
MICROALBUMIN/CREAT UR: 800 MG/G (ref 0–30)
PLATELET # BLD AUTO: 229 K/UL (ref 164–446)
PMV BLD AUTO: 11.6 FL (ref 9–12.9)
POTASSIUM SERPL-SCNC: 4.2 MMOL/L (ref 3.6–5.5)
RBC # BLD AUTO: 4.82 M/UL (ref 4.7–6.1)
SODIUM SERPL-SCNC: 143 MMOL/L (ref 135–145)
WBC # BLD AUTO: 8.8 K/UL (ref 4.8–10.8)

## 2021-10-11 PROCEDURE — 82570 ASSAY OF URINE CREATININE: CPT

## 2021-10-11 PROCEDURE — 85027 COMPLETE CBC AUTOMATED: CPT

## 2021-10-11 PROCEDURE — 80048 BASIC METABOLIC PNL TOTAL CA: CPT

## 2021-10-11 PROCEDURE — 82043 UR ALBUMIN QUANTITATIVE: CPT

## 2021-10-11 PROCEDURE — 36415 COLL VENOUS BLD VENIPUNCTURE: CPT

## 2021-10-20 ENCOUNTER — OFFICE VISIT (OUTPATIENT)
Dept: NEPHROLOGY | Facility: MEDICAL CENTER | Age: 71
End: 2021-10-20
Payer: MEDICARE

## 2021-10-20 VITALS
HEIGHT: 70 IN | DIASTOLIC BLOOD PRESSURE: 88 MMHG | BODY MASS INDEX: 33.36 KG/M2 | SYSTOLIC BLOOD PRESSURE: 132 MMHG | TEMPERATURE: 98.9 F | HEART RATE: 71 BPM | WEIGHT: 233 LBS | OXYGEN SATURATION: 95 %

## 2021-10-20 DIAGNOSIS — I50.20 ACC/AHA STAGE C HEART FAILURE WITH REDUCED EJECTION FRACTION (HCC): ICD-10-CM

## 2021-10-20 DIAGNOSIS — R60.9 EDEMA, UNSPECIFIED TYPE: ICD-10-CM

## 2021-10-20 DIAGNOSIS — N18.32 STAGE 3B CHRONIC KIDNEY DISEASE: ICD-10-CM

## 2021-10-20 DIAGNOSIS — I10 ESSENTIAL HYPERTENSION: ICD-10-CM

## 2021-10-20 PROCEDURE — 99214 OFFICE O/P EST MOD 30 MIN: CPT | Performed by: INTERNAL MEDICINE

## 2021-10-20 RX ORDER — POTASSIUM CHLORIDE 750 MG/1
10 TABLET, FILM COATED, EXTENDED RELEASE ORAL
COMMUNITY
Start: 2021-09-27 | End: 2023-03-23

## 2021-10-20 RX ORDER — TADALAFIL 20 MG/1
20 TABLET ORAL PRN
COMMUNITY
End: 2022-06-24

## 2021-10-20 RX ORDER — TERBINAFINE HYDROCHLORIDE 250 MG/1
250 TABLET ORAL PRN
COMMUNITY
End: 2021-11-10

## 2021-10-20 RX ORDER — HYDROXYCHLOROQUINE SULFATE 200 MG/1
TABLET, FILM COATED ORAL
COMMUNITY
Start: 2021-10-18 | End: 2022-01-05

## 2021-10-20 ASSESSMENT — ENCOUNTER SYMPTOMS
VOMITING: 0
SHORTNESS OF BREATH: 1
CHILLS: 0
HYPERTENSION: 1
FEVER: 0
NAUSEA: 0
COUGH: 0

## 2021-10-20 ASSESSMENT — FIBROSIS 4 INDEX: FIB4 SCORE: 1.55

## 2021-10-20 NOTE — PROGRESS NOTES
"Subjective     Zander Stewart is a 71 y.o. male who presents with Hypertension and Chronic Kidney Disease            Patient has a history of cardiomyopathy, has been having dyspnea on exertion    Hypertension  This is a chronic problem. The current episode started more than 1 year ago. The problem has been waxing and waning since onset. The problem is uncontrolled. Associated symptoms include peripheral edema and shortness of breath. Pertinent negatives include no chest pain or malaise/fatigue. Risk factors for coronary artery disease include male gender and obesity. Past treatments include diuretics, angiotensin blockers and beta blockers. The current treatment provides moderate improvement. Compliance problems include diet.  Hypertensive end-organ damage includes kidney disease. Identifiable causes of hypertension include chronic renal disease.   Chronic Kidney Disease  This is a chronic problem. The current episode started more than 1 year ago. The problem occurs constantly. The problem has been unchanged. Pertinent negatives include no chest pain, chills, coughing, fever, nausea or vomiting.       Review of Systems   Constitutional: Negative for chills, fever and malaise/fatigue.   Respiratory: Positive for shortness of breath. Negative for cough.    Cardiovascular: Negative for chest pain and leg swelling.        Dyspnea on exertion   Gastrointestinal: Negative for nausea and vomiting.   Genitourinary: Negative for dysuria, frequency and urgency.              Objective     /88 (BP Location: Right arm, Patient Position: Sitting, BP Cuff Size: Adult)   Pulse 71   Temp 37.2 °C (98.9 °F) (Temporal)   Ht 1.778 m (5' 10\")   Wt 106 kg (233 lb)   SpO2 95%   BMI 33.43 kg/m²      Physical Exam  Vitals and nursing note reviewed.   Constitutional:       General: He is not in acute distress.     Appearance: He is not ill-appearing.   HENT:      Head: Normocephalic and atraumatic.      Right Ear: External " ear normal.      Left Ear: External ear normal.      Nose: Nose normal.   Eyes:      General:         Right eye: No discharge.         Left eye: No discharge.      Conjunctiva/sclera: Conjunctivae normal.   Cardiovascular:      Rate and Rhythm: Normal rate and regular rhythm.      Heart sounds: No murmur heard.     Pulmonary:      Effort: Pulmonary effort is normal. No respiratory distress.      Breath sounds: Normal breath sounds. No wheezing.   Musculoskeletal:         General: No tenderness or deformity.      Right lower leg: Edema present.      Left lower leg: Edema present.   Skin:     General: Skin is warm.   Neurological:      General: No focal deficit present.      Mental Status: He is alert and oriented to person, place, and time.   Psychiatric:         Mood and Affect: Mood normal.         Behavior: Behavior normal.       Past Medical History:   Diagnosis Date   • Arthritis 04/23/2021    Osteo- Fingers/Hands   • Atrial fibrillation (HCC)    • Atrial flutter (HCC)    • Breath shortness 04/23/2021    In the past, is a current problem frequently   • Chronic anticoagulation    • Heart burn 04/23/2021    GERD   • Polycystic kidney, unspecified type    • Pure hypercholesterolemia    • Sleep apnea     on oxygen, unable to tolerate CPAP.   • Unspecified essential hypertension    • Unspecified sleep apnea      Social History     Socioeconomic History   • Marital status:      Spouse name: Not on file   • Number of children: Not on file   • Years of education: Not on file   • Highest education level: Not on file   Occupational History   • Not on file   Tobacco Use   • Smoking status: Current Every Day Smoker     Packs/day: 1.00     Types: Cigars   • Smokeless tobacco: Never Used   Vaping Use   • Vaping Use: Never used   Substance and Sexual Activity   • Alcohol use: Not Currently     Comment: moderate. 4/23/21: 1-2/month   • Drug use: No   • Sexual activity: Not on file   Other Topics Concern   • Not on file    Social History Narrative   • Not on file     Social Determinants of Health     Financial Resource Strain:    • Difficulty of Paying Living Expenses:    Food Insecurity:    • Worried About Running Out of Food in the Last Year:    • Ran Out of Food in the Last Year:    Transportation Needs:    • Lack of Transportation (Medical):    • Lack of Transportation (Non-Medical):    Physical Activity:    • Days of Exercise per Week:    • Minutes of Exercise per Session:    Stress:    • Feeling of Stress :    Social Connections:    • Frequency of Communication with Friends and Family:    • Frequency of Social Gatherings with Friends and Family:    • Attends Jehov[employer] witness Services:    • Active Member of Clubs or Organizations:    • Attends Club or Organization Meetings:    • Marital Status:    Intimate Partner Violence:    • Fear of Current or Ex-Partner:    • Emotionally Abused:    • Physically Abused:    • Sexually Abused:      Family History   Problem Relation Age of Onset   • Heart Disease Neg Hx      Recent Labs     04/27/21  0845 06/09/21  1318 10/11/21  1334   ALBUMIN  --  4.2  --    SODIUM 135 140 143   POTASSIUM 3.6 3.8 4.2   CHLORIDE 104 103 107   CO2 25 26 24   BUN 30* 28* 29*   CREATININE 2.03* 2.02* 2.31*                             Assessment & Plan        1. Stage 3b chronic kidney disease (HCC)  Stable  No uremic symptoms  Renal dose of medication  Avoid nephrotoxins  Continue same medication regimen  Recheck labs    2. Essential hypertension  Uncontrolled  Low-sodium diet  Increase furosemide to 80 mg daily    3. Edema, unspecified type  Low-sodium diet  Increase furosemide to 80 mg daily    4. ACC/AHA stage C heart failure with reduced ejection fraction (HCC)  Follow-up with Dr. Calderón

## 2021-11-10 ENCOUNTER — OFFICE VISIT (OUTPATIENT)
Dept: CARDIOLOGY | Facility: MEDICAL CENTER | Age: 71
End: 2021-11-10
Payer: MEDICARE

## 2021-11-10 VITALS
RESPIRATION RATE: 16 BRPM | HEIGHT: 70 IN | BODY MASS INDEX: 32.61 KG/M2 | SYSTOLIC BLOOD PRESSURE: 130 MMHG | HEART RATE: 84 BPM | OXYGEN SATURATION: 94 % | WEIGHT: 227.8 LBS | DIASTOLIC BLOOD PRESSURE: 76 MMHG

## 2021-11-10 DIAGNOSIS — R06.02 SOB (SHORTNESS OF BREATH) ON EXERTION: ICD-10-CM

## 2021-11-10 DIAGNOSIS — Z79.01 CHRONIC ANTICOAGULATION: Chronic | ICD-10-CM

## 2021-11-10 DIAGNOSIS — I48.20 ATRIAL FIBRILLATION, CHRONIC (HCC): ICD-10-CM

## 2021-11-10 DIAGNOSIS — I50.20 ACC/AHA STAGE C HEART FAILURE WITH REDUCED EJECTION FRACTION (HCC): ICD-10-CM

## 2021-11-10 DIAGNOSIS — I34.0 MITRAL VALVE INSUFFICIENCY, UNSPECIFIED ETIOLOGY: ICD-10-CM

## 2021-11-10 DIAGNOSIS — G47.33 OSA (OBSTRUCTIVE SLEEP APNEA): ICD-10-CM

## 2021-11-10 PROCEDURE — 99214 OFFICE O/P EST MOD 30 MIN: CPT | Performed by: INTERNAL MEDICINE

## 2021-11-10 RX ORDER — ESZOPICLONE 2 MG/1
TABLET, FILM COATED ORAL
COMMUNITY
End: 2021-11-10

## 2021-11-10 ASSESSMENT — ENCOUNTER SYMPTOMS
DIZZINESS: 0
MYALGIAS: 0
BRUISES/BLEEDS EASILY: 0
PALPITATIONS: 0
LOSS OF CONSCIOUSNESS: 0
SHORTNESS OF BREATH: 0
COUGH: 0

## 2021-11-10 ASSESSMENT — FIBROSIS 4 INDEX: FIB4 SCORE: 1.55

## 2021-11-10 NOTE — PROGRESS NOTES
"Chief Complaint   Patient presents with   • Congestive Heart Failure     Dx: ACC/AHA stage C heart failure with reduced ejection fraction (HCC)   • Atrial Fibrillation     Dx: Atrial fibrillation, chronic (HCC)   • Hyperlipidemia     Dx: Pure hypercholesterolemia       Subjective     Zander Stewart is a 71 y.o. male who presents today for follow-up evaluation of chronic atrial fibrillation, heart failure, chronic anticoagulation, hypertension and hyperlipidemia with a history of CKD, polycystic kidney disease, obstructive sleep apnea with intolerance to CPAP therapy.    Since 6/16/2021 appointment at which time his Lasix was increased to 80 mg QOD and then further increased to 80 mg QD the patient has lost 8 pounds and feels less short of breath but continues to have some SIM and fatigue.  He has had 2 abdominal ultrasounds a RTC (results unavailable) which \"show no change\" apparently normal I have ascites.     H/O HOWARD diagnosed 20 years ago probably with [facility] but did not tolerate CPAP.  Lost to follow-up.     Previously followed by Dr. Drake Westbrook, electrophysiologist and Dr. Scot Hernández, retired cardiologist    Past Medical History:   Diagnosis Date   • Arthritis 04/23/2021    Osteo- Fingers/Hands   • Atrial fibrillation (HCC)    • Atrial flutter (HCC)    • Breath shortness 04/23/2021    In the past, is a current problem frequently   • Chronic anticoagulation    • Heart burn 04/23/2021    GERD   • Polycystic kidney, unspecified type    • Pure hypercholesterolemia    • Sleep apnea     on oxygen, unable to tolerate CPAP.   • Unspecified essential hypertension    • Unspecified sleep apnea      Past Surgical History:   Procedure Laterality Date   • TONSILLECTOMY  1982   • APPENDECTOMY  1966   • APPENDECTOMY     • OTHER      Cardiac Cath   • OTHER      Kidney Surgery   • OTHER      Nose Surgery   • OTHER      Tonsillectomy with Adenoidectomy     Family History   Problem Relation Age of " Onset   • Heart Disease Neg Hx      Social History     Socioeconomic History   • Marital status:      Spouse name: Not on file   • Number of children: Not on file   • Years of education: Not on file   • Highest education level: Not on file   Occupational History   • Not on file   Tobacco Use   • Smoking status: Current Every Day Smoker     Packs/day: 1.00     Types: Cigars   • Smokeless tobacco: Never Used   Vaping Use   • Vaping Use: Never used   Substance and Sexual Activity   • Alcohol use: Not Currently     Comment: moderate. 4/23/21: 1-2/month   • Drug use: No   • Sexual activity: Not on file   Other Topics Concern   • Not on file   Social History Narrative   • Not on file     Social Determinants of Health     Financial Resource Strain:    • Difficulty of Paying Living Expenses: Not on file   Food Insecurity:    • Worried About Running Out of Food in the Last Year: Not on file   • Ran Out of Food in the Last Year: Not on file   Transportation Needs:    • Lack of Transportation (Medical): Not on file   • Lack of Transportation (Non-Medical): Not on file   Physical Activity:    • Days of Exercise per Week: Not on file   • Minutes of Exercise per Session: Not on file   Stress:    • Feeling of Stress : Not on file   Social Connections:    • Frequency of Communication with Friends and Family: Not on file   • Frequency of Social Gatherings with Friends and Family: Not on file   • Attends Gnosticism Services: Not on file   • Active Member of Clubs or Organizations: Not on file   • Attends Club or Organization Meetings: Not on file   • Marital Status: Not on file   Intimate Partner Violence:    • Fear of Current or Ex-Partner: Not on file   • Emotionally Abused: Not on file   • Physically Abused: Not on file   • Sexually Abused: Not on file   Housing Stability:    • Unable to Pay for Housing in the Last Year: Not on file   • Number of Places Lived in the Last Year: Not on file   • Unstable Housing in the Last  "Year: Not on file     No Known Allergies  Outpatient Encounter Medications as of 11/10/2021   Medication Sig Dispense Refill   • hydroxychloroquine (PLAQUENIL) 200 MG Tab Has not yet picked up from pharmacy     • potassium chloride ER (KLOR-CON) 10 MEQ tablet      • tadalafil (CIALIS) 20 MG tablet Take 20 mg by mouth as needed.     • carvedilol (COREG) 12.5 MG Tab Take 1 Tablet by mouth 2 times a day with meals. 180 Tablet 3   • furosemide (LASIX) 80 MG Tab TAKE ONE TABLET BY MOUTH EVERY DAY 90 tablet 3   • losartan (COZAAR) 100 MG Tab TAKE ONE TABLET BY MOUTH EVERY DAY 90 tablet 3   • warfarin (COUMADIN) 5 MG Tab Coumadin 5 mg tablet   Take 1 tablet every day by oral route.     • omeprazole (PRILOSEC) 20 MG CPDR Take 20 mg by mouth 2 times a day.     • zolpidem (AMBIEN) 10 MG TABS Take 10 mg by mouth at bedtime as needed.     • [DISCONTINUED] Eszopiclone 2 MG Tab eszopiclone 2 mg tablet (Patient not taking: Reported on 11/10/2021)     • [DISCONTINUED] terbinafine (LAMISIL) 250 MG Tab Take 250 mg by mouth as needed. (Patient not taking: Reported on 11/10/2021)     • [DISCONTINUED] ciprofloxacin (CILOXIN) 0.3 % Solution  (Patient not taking: Reported on 10/20/2021)     • [DISCONTINUED] VENTOLIN  (90 Base) MCG/ACT Aero Soln inhalation aerosol Inhale 1-2 Puffs by mouth as needed. (Patient not taking: Reported on 10/20/2021)       No facility-administered encounter medications on file as of 11/10/2021.     Review of Systems   Respiratory: Negative for cough and shortness of breath.    Cardiovascular: Negative for chest pain and palpitations.   Musculoskeletal: Negative for myalgias.   Neurological: Negative for dizziness and loss of consciousness.   Endo/Heme/Allergies: Does not bruise/bleed easily.              Objective     /76 (BP Location: Left arm, Patient Position: Sitting, BP Cuff Size: Adult)   Pulse 84   Resp 16   Ht 1.778 m (5' 10\")   Wt 103 kg (227 lb 12.8 oz)   SpO2 94%   BMI 32.69 kg/m² "     Physical Exam  Vitals reviewed.   Constitutional:       General: He is not in acute distress.     Appearance: He is well-developed.   Eyes:      Conjunctiva/sclera: Conjunctivae normal.      Pupils: Pupils are equal, round, and reactive to light.   Neck:      Vascular: No JVD.      Comments: Normal at 90 degrees.  Cardiovascular:      Rate and Rhythm: Normal rate. Rhythm irregular.      Pulses:           Carotid pulses are 2+ on the right side and 2+ on the left side.     Heart sounds: Murmur heard.   No friction rub. No gallop.    Pulmonary:      Effort: Pulmonary effort is normal. No accessory muscle usage or respiratory distress.      Breath sounds: Normal breath sounds. No wheezing or rales.   Abdominal:      General: There is no distension.      Palpations: Abdomen is soft. There is no mass.      Tenderness: There is no abdominal tenderness.      Comments: Markedly protuberant possible ascites.   Musculoskeletal:      Cervical back: Normal range of motion and neck supple.      Comments: Chronic dermatologic changes lower extremities.  No pitting edema.   Skin:     General: Skin is warm and dry.      Findings: No rash.      Nails: There is no clubbing.   Neurological:      Mental Status: He is alert and oriented to person, place, and time.   Psychiatric:         Behavior: Behavior normal.              ECHOCARDIOGRAM 06/06/2013  Left ejection fraction 55-60%.  Mild concentric left ventricular hypertrophy.  Mild mitral regurgitation.  Severe left atrial dilatation.  Moderate right atrial dilatation.     ECHOCARDIOGRAM 09/02/2020  No prior study is available for comparison.   Normal left ventricular chamber size.  Mildly reduced left ventricular systolic function.  Left ventricular ejection fraction is visually estimated to be 45-50%.  Aortic sclerosis without stenosis.  Mild mitral regurgitation.  Normal right ventricular size and systolic function.  Right heart pressures are normal.  Severely dilated left  atrium.  Rythym is atrial fibrillation.     TRANSESOPHAGEAL ECHOCARDIOGRAM 4/27/2021.  Mild to moderately reduced left ventricular systolic function.  Left ventricular ejection fraction is visually estimated to be 40-45%.  Mild aortic insufficiency.  Mild degenerative mitral valve leaftets.  Moderate mitral regurgitation, central with 2 jets.     EKG 04/25/2013 atrial fibrillation, rate 83.    Assessment & Plan     1. SOB (shortness of breath) on exertion  EC-ECHOCARDIOGRAM COMPLETE W/O CONT   2. ACC/AHA stage C heart failure with reduced ejection fraction (HCC)  EC-ECHOCARDIOGRAM COMPLETE W/O CONT   3. Mitral valve insufficiency, unspecified etiology  EC-ECHOCARDIOGRAM COMPLETE W/O CONT   4. Atrial fibrillation, chronic (HCC)     5. Chronic anticoagulation     6. HOWARD (obstructive sleep apnea)  Referral to Pulmonary and Sleep Medicine       Medical Decision Making: Today's Assessment/Status/Plan:   Assessment  1.  Atrial fibrillation. Chronic.   2.  Systolic CHF, EF 45-50%.  3.  Mitral regurgitation.  Moderate.  4.  Anticoagulation.  On warfarin.  5.  Pulmonary hypertension.     6.  Hypertension.  7.  Dyslipidemia.  8.  HOWARD on CPAP.  9.  CKD.  10.  Obesity.  11.  LE edema aggravated by diltiazem.  12.  H/O HOWARD, diagnosed 20 years ago, currently untreated.     Recommendation and recommendation  1.   The patient is clinically improved with increased diuretic therapy with resolution of LE edema and less SOB but still has SIM and fatigue probably multifactorial but I still suspect related to his mitral regurgitation which despite [place] showing only moderate regurgitation (possibly related to sedation educed low BP) and is probably severe under normal daily conditions in addition other factors including chronic atrial fibrillation and untreated HOWARD.  2.  Will repeat transthoracic echocardiogram and if it appears severe would refer to valve clinic.  3.  Will referred to pulmonary for HOWARD evaluation and recommendation.

## 2021-12-08 ENCOUNTER — HOSPITAL ENCOUNTER (OUTPATIENT)
Dept: CARDIOLOGY | Facility: MEDICAL CENTER | Age: 71
End: 2021-12-08
Attending: INTERNAL MEDICINE
Payer: MEDICARE

## 2021-12-08 DIAGNOSIS — I34.0 MITRAL VALVE INSUFFICIENCY, UNSPECIFIED ETIOLOGY: ICD-10-CM

## 2021-12-08 DIAGNOSIS — I50.20 ACC/AHA STAGE C HEART FAILURE WITH REDUCED EJECTION FRACTION (HCC): ICD-10-CM

## 2021-12-08 DIAGNOSIS — R06.02 SOB (SHORTNESS OF BREATH) ON EXERTION: ICD-10-CM

## 2021-12-08 PROCEDURE — 93306 TTE W/DOPPLER COMPLETE: CPT

## 2021-12-09 DIAGNOSIS — I10 ESSENTIAL HYPERTENSION: ICD-10-CM

## 2021-12-09 DIAGNOSIS — I34.0 MITRAL VALVE INSUFFICIENCY, UNSPECIFIED ETIOLOGY: ICD-10-CM

## 2021-12-09 LAB
LV EJECT FRACT MOD 2C 99903: 60.02
LV EJECT FRACT MOD 4C 99902: 39.79
LV EJECT FRACT MOD BP 99901: 51.18

## 2021-12-09 PROCEDURE — 93306 TTE W/DOPPLER COMPLETE: CPT | Mod: 26 | Performed by: INTERNAL MEDICINE

## 2021-12-10 NOTE — PROGRESS NOTES
Called the patient but spoke with his wife about the results of his echocardiogram which continues show an EF 45-50% with moderate mitral regurgitation but appears more severe and worsening pulmonary hypertension.  Also has polycystic kidney disease with worsening creatinine followed by [facility] Fadi Najjar.  Increase Lasix to 80 mg twice daily  BMP  Referred to Dr. Ta Cardenas for evaluation of MitraClip.

## 2021-12-13 ENCOUNTER — TELEPHONE (OUTPATIENT)
Dept: CARDIOLOGY | Facility: MEDICAL CENTER | Age: 71
End: 2021-12-13

## 2021-12-13 NOTE — TELEPHONE ENCOUNTER
Left voice message regarding [facility] consult referred by Dr. Calderón for MR. Provided direct contact information and encouraged return call to discuss such in detail and arrange consult. Awaiting return call.

## 2021-12-14 ENCOUNTER — TELEPHONE (OUTPATIENT)
Dept: CARDIOLOGY | Facility: MEDICAL CENTER | Age: 71
End: 2021-12-14

## 2021-12-14 NOTE — TELEPHONE ENCOUNTER
Spoke with patient regarding SHP referral placed by Dr. Calderón. Reviewed availability for consult. Patient request to postpone consult until after the holidays.     Patient agrees to 1/5/21 consult. Patient requests 11am consult as he has another appointment this same date just following this time. Assured patient this consult time is available. Reviewed apt details including date, time, and location. Patient repeats back all information accurately, states no questions at this time and very thankful for call.     HORACIO ROSARIO slot C 1/5 with [place] for TMVR consult

## 2022-01-05 ENCOUNTER — OFFICE VISIT (OUTPATIENT)
Dept: SLEEP MEDICINE | Facility: MEDICAL CENTER | Age: 72
End: 2022-01-05
Payer: MEDICARE

## 2022-01-05 ENCOUNTER — OFFICE VISIT (OUTPATIENT)
Dept: CARDIOLOGY | Facility: MEDICAL CENTER | Age: 72
End: 2022-01-05
Payer: MEDICARE

## 2022-01-05 VITALS
BODY MASS INDEX: 32.14 KG/M2 | RESPIRATION RATE: 14 BRPM | HEIGHT: 70 IN | SYSTOLIC BLOOD PRESSURE: 134 MMHG | DIASTOLIC BLOOD PRESSURE: 84 MMHG | HEART RATE: 66 BPM | WEIGHT: 224.5 LBS | OXYGEN SATURATION: 95 %

## 2022-01-05 VITALS
RESPIRATION RATE: 16 BRPM | BODY MASS INDEX: 32.14 KG/M2 | WEIGHT: 224 LBS | HEART RATE: 72 BPM | OXYGEN SATURATION: 95 %

## 2022-01-05 DIAGNOSIS — I48.20 ATRIAL FIBRILLATION, CHRONIC (HCC): ICD-10-CM

## 2022-01-05 DIAGNOSIS — I48.11 LONGSTANDING PERSISTENT ATRIAL FIBRILLATION (HCC): ICD-10-CM

## 2022-01-05 DIAGNOSIS — F51.04 CHRONIC INSOMNIA: ICD-10-CM

## 2022-01-05 DIAGNOSIS — G47.33 OSA (OBSTRUCTIVE SLEEP APNEA): Primary | ICD-10-CM

## 2022-01-05 PROCEDURE — 99204 OFFICE O/P NEW MOD 45 MIN: CPT | Performed by: STUDENT IN AN ORGANIZED HEALTH CARE EDUCATION/TRAINING PROGRAM

## 2022-01-05 PROCEDURE — 99204 OFFICE O/P NEW MOD 45 MIN: CPT | Performed by: INTERNAL MEDICINE

## 2022-01-05 ASSESSMENT — FIBROSIS 4 INDEX
FIB4 SCORE: 1.55
FIB4 SCORE: 1.55

## 2022-01-05 NOTE — PROGRESS NOTES
[facility] Consult Note     Date: 1/5/2022 / Time: 12:35 PM      Thank you for requesting a sleep medicine consultation on Zander Stewart at the sleep center. Presents today with the chief complaints of snoring,  occasional excessive daytime sleepiness and hx of sleep apnea. He is referred by Eugene Calderón M.D.  [address]  [address]  [place] [phone] for evaluation and treatment of sleep apnea.     HISTORY OF PRESENT ILLNESS:     Zander Stewart is a 71 y.o. male with hypertension, CKD, atrial fibrillation, mitral regurgitation and, heart failure, GERD and history of obstructive sleep apnea.  Presents to Sleep Clinic for evaluation of sleep apnea.    States approximately 20 years ago he underwent his last sleep study.  He was told he had obstructive sleep apnea and was trialed on CPAP.  States he has tried CPAP on 2 different occasions.  Each time is tried CPAP he found the machine cumbersome and interrupted to sleep.  States his brother as well as his father have been diagnosed with obstructive sleep apnea and were unable to tolerate Pap therapy.    He has been told by his wife he snores in his sleep however he has been using a wedge pillow which has decreased his snoring.    He has trouble with sleep initiation.  States without taking Ambien it will take him 30 minutes to 1 hour to fall asleep often more.  This will occur every night of the week.  States this been going on for several years and if he does not get enough sleep he does not feel rested in the morning.  With Ambien he can fall asleep within 20 minutes.    He is sleepy at times during the day particularly following lunch and dinner.  However he does not nap.  Denies drowsiness while driving.    As per supplemental questionnaire to be scanned or imported into chart:    Bonham Sleepiness Score: 6    Sleep Schedule  Bedtime: Weekday & Weekend 930-1030pm  Wake time: Weekday & Weekend 6-630am  Sleep-onset latency: 20  "min on ambien, off longer than 30min   Awakenings from sleep: 0-1  Difficulty falling back asleep: none   Bedroom partner: wife   Naps: No     DAYTIME SYMPTOMS:   Excessive daytime sleepiness: No   Daytime fatigue: Yes  Difficulty concentrating: No   Memory problems: No   Irritability:No   Work/school performance issues: No   Sleepiness with driving: No   Caffeine/stimulant use: Yes 2 cups coffee in morning   Alcohol use:Yes, How Many? 1 drink week     SLEEP RELATED SYMPTOMS  Snoring: Yes  Witnessed apnea or gasping/choking: No   Dry mouth or mouth breathing: Yes  Sweating: No   Teeth grinding/biting: No   Morning headaches: No   Refreshed Upon Awakening: Yes     SLEEP RELATED BEHAVIORS:  Parasomnias (walking, talking, eating, violence): sometimes gets up to get a snack and wont remember. Happened 2 years ago.   Leg kicking: No   Restless legs - \"urge to move\": No   Nightmares: No  Recurrent: No   Dream enactment: No      NARCOLEPSY:  Cataplexy: No   Sleep paralysis: No   Sleep attacks: No   Hypnagogic/hypnopompic hallucinations: No     MEDICAL HISTORY  Past Medical History:   Diagnosis Date   • Arthritis 04/23/2021    Osteo- Fingers/Hands   • Atrial fibrillation (HCC)    • Atrial flutter (HCC)    • Breath shortness 04/23/2021    In the past, is a current problem frequently   • Chickenpox    • Chronic anticoagulation    • Heart burn 04/23/2021    GERD   • Polycystic kidney, unspecified type    • Pure hypercholesterolemia    • Sleep apnea     on oxygen, unable to tolerate CPAP.   • Unspecified essential hypertension    • Unspecified sleep apnea         SURGICAL HISTORY  Past Surgical History:   Procedure Laterality Date   • TONSILLECTOMY  1982   • APPENDECTOMY  1966   • APPENDECTOMY     • OTHER      Cardiac Cath   • OTHER      Kidney Surgery   • OTHER      Nose Surgery   • OTHER      Tonsillectomy with Adenoidectomy        FAMILY HISTORY  Family History   Problem Relation Age of Onset   • Heart Disease Neg Hx  "       SOCIAL HISTORY  Social History     Socioeconomic History   • Marital status:      Spouse name: Not on file   • Number of children: Not on file   • Years of education: Not on file   • Highest education level: Not on file   Occupational History   • Not on file   Tobacco Use   • Smoking status: Current Every Day Smoker     Packs/day: 1.00     Types: Cigars   • Smokeless tobacco: Never Used   Vaping Use   • Vaping Use: Never used   Substance and Sexual Activity   • Alcohol use: Not Currently     Comment: moderate. 4/23/21: 1-2/month   • Drug use: No   • Sexual activity: Not on file   Other Topics Concern   • Not on file   Social History Narrative   • Not on file     Social Determinants of Health     Financial Resource Strain:    • Difficulty of Paying Living Expenses: Not on file   Food Insecurity:    • Worried About Running Out of Food in the Last Year: Not on file   • Ran Out of Food in the Last Year: Not on file   Transportation Needs:    • Lack of Transportation (Medical): Not on file   • Lack of Transportation (Non-Medical): Not on file   Physical Activity:    • Days of Exercise per Week: Not on file   • Minutes of Exercise per Session: Not on file   Stress:    • Feeling of Stress : Not on file   Social Connections:    • Frequency of Communication with Friends and Family: Not on file   • Frequency of Social Gatherings with Friends and Family: Not on file   • Attends Nondenominational Services: Not on file   • Active Member of Clubs or Organizations: Not on file   • Attends Club or Organization Meetings: Not on file   • Marital Status: Not on file   Intimate Partner Violence:    • Fear of Current or Ex-Partner: Not on file   • Emotionally Abused: Not on file   • Physically Abused: Not on file   • Sexually Abused: Not on file   Housing Stability:    • Unable to Pay for Housing in the Last Year: Not on file   • Number of Places Lived in the Last Year: Not on file   • Unstable Housing in the Last Year: Not on file         Occupation: Retired, [employer] and [place]     CURRENT MEDICATIONS  Current Outpatient Medications   Medication Sig Dispense Refill   • potassium chloride ER (KLOR-CON) 10 MEQ tablet      • tadalafil (CIALIS) 20 MG tablet Take 20 mg by mouth as needed.     • carvedilol (COREG) 12.5 MG Tab Take 1 Tablet by mouth 2 times a day with meals. 180 Tablet 3   • furosemide (LASIX) 80 MG Tab TAKE ONE TABLET BY MOUTH EVERY DAY (Patient taking differently: 2 times a day.) 90 tablet 3   • losartan (COZAAR) 100 MG Tab TAKE ONE TABLET BY MOUTH EVERY DAY 90 tablet 3   • warfarin (COUMADIN) 5 MG Tab Coumadin 5 mg tablet   Take 1 tablet every day by oral route.     • omeprazole (PRILOSEC) 20 MG CPDR Take 20 mg by mouth 2 times a day.     • zolpidem (AMBIEN) 10 MG TABS Take 10 mg by mouth at bedtime as needed.       No current facility-administered medications for this visit.       REVIEW OF SYSTEMS  Constitutional: Denies fevers, Denies weight changes  Ears/Nose/Throat/Mouth: Denies nasal congestion or sore throat   Cardiovascular: Denies chest pain  Respiratory: Denies shortness of breath, Denies cough  Gastrointestinal/Hepatic: Denies nausea, vomiting  Sleep: see HPI    Physical Examination:  Vitals/ General Appearance:   Weight/BMI: Body mass index is 32.14 kg/m².  Pulse 72   Resp 16   Wt 102 kg (224 lb)   SpO2 95%   Vitals:    01/05/22 1238   Pulse: 72   Resp: 16   SpO2: 95%   Weight: 102 kg (224 lb)       Pt. is alert and oriented to time, place and person. Cooperative and in no apparent distress.     Constitutional: Alert, no distress, well-groomed.  Skin: No rashes in visible areas.  Eye: Round. Conjunctiva clear, lids normal. No icterus.   ENT EXAM  Nasal alae/valves collapsible: No   Nasal septum deviation: Yes  Nasal turbinate hypertrophy: Left: Grade 1   Right: Grade 1  Hard palate narrow: Yes  Hard palate high: No   Soft palate/uvula (Mallampati score): 4  Tongue Scalloping: No   Retrognathia: No   Micrognathia: No    Cardiovascular:no murmus/gallops/rubs, normal S1 and S2 heart sounds, regular rate and rhythm  Pulmonary:Clear to auscultation, No wheezes  Neurologic:Cranial nerves II-XII grossly intact, Normal age appropriate gait, No involuntary motions.  Extremities: +1 pitting edema in bilateral lower extremity/ No clubbing, cyanosis    ASSESSMENT AND PLAN   Zander Stewart is a 71 y.o. male with hypertension, CKD, atrial fibrillation, mitral regurgitation and, heart failure, GERD and history of obstructive sleep apnea.  Presents to Sleep Clinic for evaluation of sleep apnea.    1. Zander Stewart  has symptoms of Obstructive Sleep Apnea (HOWARD) and known history of sleep apnea. Zander Stewart has symptoms of snoring, dry mouth.     Pt has risk factors for HOWARD include obesity and crowded oropharynx.       The pathophysiology of HOWARD and the increased risk of cardiovascular morbidity from untreated HOWARD is discussed in detail with the patient. He  also has HTN, heart disease, atrial fibrillation, which can be worsened by HOWARD.      We have discussed diagnostic options including in-laboratory, attended polysomnography and home sleep testing. We have also discussed treatment options including airway pressurization, reconstructive otolaryngologic surgery, dental appliances and weight management.     Subsequently,treatment options will be discussed based on the diagnostic study. Meanwhile, He is urged to avoid supine sleep, weight gain and alcoholic beverages since all of these can worsen HOWARD. He is cautioned against drowsy driving. If He feels sleepy while driving, advised must pull over for a break/nap, rather than persist on the road, in the interest of Pt's own safety and that of others on the road.    Advised patient that with his medical history he would benefit from in lab sleep study.  Patient would like to continue to think about either in lab sleep study versus home sleep study.    It was discussed in  detail how his obstructive sleep apnea may impede his cardiac output and cardiac functioning.  Recommended he strongly encourage treatment for his obstructive sleep apnea.  Informed that PAP therapy has changed in the last 20 years and he may find it more comfortable than it was in the past.  In addition there are alternatives to PAP therapy depending on severity such as surgical interventions and oral appliance.    Plan  -  He  will be scheduled for an overnight PSG or HST to assess sleep related breathing disorder once he has made a decision  - Follow up 1-2 weeks after sleep study to discuss results and treatment options moving forward   -Advised to reach out via [employer] or by phone with any questions or concerns.     2.  Chronic Insomnia   With prolonged sleep latency and feeling this is impacting daily functioning for years meets criteria for chronic insomnia. Discussed potential causes of insomnia and importance of having a routine around bedtime. Advised to avoid laying in bed for more then 20-30 min if unable to fall asleep. Dicussed cognitive behavioral therapy for insomnia (CBTi) which is first line treatment for insomnia. Recommended pt participate in CBTi as would likely benefit. Reviewed pharmacologic therapy which is second line treatment and may be considered if circumstances permit.     Discussed risks of using Ambien over the age of 65 including increased falls, confusion, ER visits and hip fractures.    RECOMMENDATIONS  -Referral placed to CBT-I  -We also discussed a few interventions noted below to help with the insomnia:  -Maintain a regular sleep schedule  -Avoid caffeinated beverages after lunch, and alcohol in late afternoon and evening  -Avoid prolonged use of light-emitting screens before bedtime  -Exercise regularly for at least 20 minutes, preferably more than four to five hours prior to bedtime  -Avoid daytime naps, especially if they are longer than 20 to 30 minutes or occur late in the  day  -Advised to contact our office or myself with any questions via [employer]    Regarding treatment of other past medical problems and general health maintenance,  Pt is urged to follow up with PCP.    Please note portions of this record was created using voice recognition software. I have made every reasonable attempt to correct obvious errors, but I expect that there are errors of grammar and possibly content I did not discover before finalizing the note.

## 2022-01-05 NOTE — PATIENT INSTRUCTIONS
BRIEF OPERATIVE NOTE    Date of Procedure: 6/19/2018   Preoperative Diagnosis: Arthritis of knee [M17.10]  Postoperative Diagnosis: Arthritis of knee [M17.10]    Procedure(s):  RIGHT UNICONDYLAR KNEE, Lateral compartment  Surgeon(s) and Role:     * Rannie Goodpasture, MD - Primary         Surgical Assistant: Lennox Mcneil    Surgical Staff:  Circ-1: Radha Mayfield  Circ-Relief: Brandyn Kaiser RN  Physician Assistant: MARLEY Barboza  Scrub RN-1: Radha Andrews RN  Scrub RN-Relief: Brandyn Kaiser RN  Surg Asst-1: Thiago Sinclair  Surg Asst-Relief: [facility]  Event Time In   Incision Start 1236   Incision Close 1355     Anesthesia: Spinal   Estimated Blood Loss: minimal  Specimens: * No specimens in log *   Findings: oa lateral grade 4   Complications: none  Implants:   Implant Name Type Inv.  Item Serial No.  Lot No. LRB No. Used Action   CEMENT BNE GENTAMC GHV 40GM -- SMARTSET - SNA  CEMENT BNE GENTAMC GHV 40GM -- SMARTSET NA [facility] [employer] [id number] Right 1 Implanted   TRAY TIB UNI PC LM/RL 5 -- ZUK - SNA  TRAY TIB UNI PC LM/RL 5 -- ZUK NA [employer] [id number] Right 1 Implanted   Right lateral Femoral Component   NA [employer] [id number] Right 1 Implanted   INSERT TIB UNI 5 9MM -- ZUK - SNA   INSERT TIB UNI 5 9MM -- ZUK NA [employer] [id number] Right 1 Implanted Schedule appointment for Cognitive Behavior Therapy for insomnia (CBTi) with Bailey Ahuja     Sleep Apnea  Sleep apnea affects breathing during sleep. It causes breathing to stop for a short time or to become shallow. It can also increase the risk of:  · Heart attack.  · Stroke.  · Being very overweight (obese).  · Diabetes.  · Heart failure.  · Irregular heartbeat.  The goal of treatment is to help you breathe normally again.  What are the causes?  There are three kinds of sleep apnea:  · Obstructive sleep apnea. This is caused by a blocked or collapsed airway.  · Central sleep apnea. This happens when the brain does not send the right signals to the muscles that control breathing.  · Mixed sleep apnea. This is a combination of obstructive and central sleep apnea.  The most common cause of this condition is a collapsed or blocked airway. This can happen if:  · Your throat muscles are too relaxed.  · Your tongue and tonsils are too large.  · You are overweight.  · Your airway is too small.  What increases the risk?  · Being overweight.  · Smoking.  · Having a small airway.  · Being older.  · Being male.  · Drinking alcohol.  · Taking medicines to calm yourself (sedatives or tranquilizers).  · Having family members with the condition.  What are the signs or symptoms?  · Trouble staying asleep.  · Being sleepy or tired during the day.  · Getting angry a lot.  · Loud snoring.  · Headaches in the morning.  · Not being able to focus your mind (concentrate).  · Forgetting things.  · Less interest in sex.  · Mood swings.  · Personality changes.  · Feelings of sadness (depression).  · Waking up a lot during the night to pee (urinate).  · Dry mouth.  · Sore throat.  How is this diagnosed?  · Your medical history.  · A physical exam.  · A test that is done when you are sleeping (sleep study). The test is most often done in a sleep lab but may also be done at home.  How is this treated?    · Sleeping on your side.  · Using a  medicine to get rid of mucus in your nose (decongestant).  · Avoiding the use of alcohol, medicines to help you relax, or certain pain medicines (narcotics).  · Losing weight, if needed.  · Changing your diet.  · Not smoking.  · Using a machine to open your airway while you sleep, such as:  ? An oral appliance. This is a mouthpiece that shifts your lower jaw forward.  ? A CPAP device. This device blows air through a mask when you breathe out (exhale).  ? An EPAP device. This has valves that you put in each nostril.  ? A BPAP device. This device blows air through a mask when you breathe in (inhale) and breathe out.  · Having surgery if other treatments do not work.  It is important to get treatment for sleep apnea. Without treatment, it can lead to:  · High blood pressure.  · Coronary artery disease.  · In men, not being able to have an erection (impotence).  · Reduced thinking ability.  Follow these instructions at home:  Lifestyle  · Make changes that your doctor recommends.  · Eat a healthy diet.  · Lose weight if needed.  · Avoid alcohol, medicines to help you relax, and some pain medicines.  · Do not use any products that contain nicotine or tobacco, such as cigarettes, e-cigarettes, and chewing tobacco. If you need help quitting, ask your doctor.  General instructions  · Take over-the-counter and prescription medicines only as told by your doctor.  · If you were given a machine to use while you sleep, use it only as told by your doctor.  · If you are having surgery, make sure to tell your doctor you have sleep apnea. You may need to bring your device with you.  · Keep all follow-up visits as told by your doctor. This is important.  Contact a doctor if:  · The machine that you were given to use during sleep bothers you or does not seem to be working.  · You do not get better.  · You get worse.  Get help right away if:  · Your chest hurts.  · You have trouble breathing in enough air.  · You have an uncomfortable  feeling in your back, arms, or stomach.  · You have trouble talking.  · One side of your body feels weak.  · A part of your face is hanging down.  These symptoms may be an emergency. Do not wait to see if the symptoms will go away. Get medical help right away. Call your local emergency services (911 in the [place] Do not drive yourself to the hospital.  Summary  · This condition affects breathing during sleep.  · The most common cause is a collapsed or blocked airway.  · The goal of treatment is to help you breathe normally while you sleep.  This information is not intended to replace advice given to you by your health care provider. Make sure you discuss any questions you have with your health care provider.  Document Released: 09/26/2009 Document Revised: 10/04/2019 Document Reviewed: 08/13/2019  [employer] Patient Education © 2020 [employer]

## 2022-01-05 NOTE — PROGRESS NOTES
Structural heart initial Consultation Note    Date of note:    1/5/2022      Patient Name: Zander Stewart   YOB: 1950  MRN:              [id number]    Chief Complaint: Shortness of breath, valvular heart disease    Zander Stewart is a 71 y.o. male  patient referred by Dr. Calderón for valvular heart disease.  He has chronic longstanding atrial fibrillation, prior unsuccessful cardioversions, valvular heart disease with moderate to severe mitral regurgitation, moderate tricuspid regurgitation.  Has been having shortness of breath with exertion last 1 to 2 years.  No chest pains.      Past Medical History:   Diagnosis Date   • Arthritis 04/23/2021    Osteo- Fingers/Hands   • Atrial fibrillation (HCC)    • Atrial flutter (HCC)    • Breath shortness 04/23/2021    In the past, is a current problem frequently   • Chickenpox    • Chronic anticoagulation    • Heart burn 04/23/2021    GERD   • Polycystic kidney, unspecified type    • Pure hypercholesterolemia    • Sleep apnea     on oxygen, unable to tolerate CPAP.   • Unspecified essential hypertension    • Unspecified sleep apnea              Current Outpatient Medications   Medication Sig Dispense Refill   • potassium chloride ER (KLOR-CON) 10 MEQ tablet      • tadalafil (CIALIS) 20 MG tablet Take 20 mg by mouth as needed.     • carvedilol (COREG) 12.5 MG Tab Take 1 Tablet by mouth 2 times a day with meals. 180 Tablet 3   • furosemide (LASIX) 80 MG Tab TAKE ONE TABLET BY MOUTH EVERY DAY (Patient taking differently: 2 times a day.) 90 tablet 3   • losartan (COZAAR) 100 MG Tab TAKE ONE TABLET BY MOUTH EVERY DAY 90 tablet 3   • warfarin (COUMADIN) 5 MG Tab Coumadin 5 mg tablet   Take 1 tablet every day by oral route.     • omeprazole (PRILOSEC) 20 MG CPDR Take 20 mg by mouth 2 times a day.     • zolpidem (AMBIEN) 10 MG TABS Take 10 mg by mouth at bedtime as needed.       No current facility-administered medications for this visit.  "            Physical Exam:  Ambulatory Vitals  /84 (BP Location: Left arm, Patient Position: Sitting, BP Cuff Size: Adult)   Pulse 66   Resp 14   Ht 1.778 m (5' 10\")   Wt 102 kg (224 lb 8 oz)   SpO2 95%    Oxygen Therapy:  Pulse Oximetry: 95 %  BP Readings from Last 4 Encounters:   22 134/84   11/10/21 130/76   10/20/21 132/88   21 140/72       Weight/BMI: Body mass index is 32.21 kg/m².  Wt Readings from Last 4 Encounters:   22 102 kg (224 lb)   22 102 kg (224 lb 8 oz)   11/10/21 103 kg (227 lb 12.8 oz)   10/20/21 106 kg (233 lb)           General: Well appearing and in no apparent distress  Neck: carotid bruits absent  Lungs: respiratory sounds  normal  Heart: irregularly irregular rhythm,  No palpable thrills on palpation, murmurs present 2 x 6 systolic murmur, no rubs,   Lowe extremity edema present.       Echocardiogram, [place], prior cardiology notes reviewed.  He has moderate to severe mitral regurgitation, moderate tricuspid regurgitation, pulmonary hypertension RVSP 65, severely enlarged atria    Medical Decision Makin-year-old male patient with dyspnea on exertion, chronic longstanding atrial fibrillation, sleep apnea, hypertension, obesity, congestive heart failure with preserved ejection fraction NYHA class II stage C, moderate to severe mitral regurgitation, moderate tricuspid regurgitation.    His valvular regurgitation mostly from annular dilatation from his longstanding atrial fibrillation.  At this time recommend a stress test to rule out coronary artery disease, refer to atrial fibrillation ablation.  Even though chances of maintaining sinus rhythm is low but keeping him in sinus rhythm will likely result in improvement in his valvular regurgitation.    Return in about 3 months (around 2022).    This note was dictated using Dragon speech recognition software.    Ta BISWAS  Interventional cardiologist  [facility] " [facility]  [address] [address]  Phone: [phone]  Fax: [phone]

## 2022-02-09 ENCOUNTER — OFFICE VISIT (OUTPATIENT)
Dept: CARDIOLOGY | Facility: MEDICAL CENTER | Age: 72
End: 2022-02-09
Attending: INTERNAL MEDICINE
Payer: MEDICARE

## 2022-02-09 VITALS
OXYGEN SATURATION: 93 % | WEIGHT: 223 LBS | HEART RATE: 77 BPM | DIASTOLIC BLOOD PRESSURE: 86 MMHG | BODY MASS INDEX: 31.92 KG/M2 | HEIGHT: 70 IN | SYSTOLIC BLOOD PRESSURE: 136 MMHG

## 2022-02-09 DIAGNOSIS — I48.20 ATRIAL FIBRILLATION, CHRONIC (HCC): ICD-10-CM

## 2022-02-09 PROCEDURE — 93000 ELECTROCARDIOGRAM COMPLETE: CPT | Performed by: INTERNAL MEDICINE

## 2022-02-09 PROCEDURE — 99215 OFFICE O/P EST HI 40 MIN: CPT | Performed by: INTERNAL MEDICINE

## 2022-02-09 ASSESSMENT — FIBROSIS 4 INDEX: FIB4 SCORE: 1.57

## 2022-02-09 NOTE — PROGRESS NOTES
Arrhythmia Clinic Note (New patient)     DOS: 2/9/2022    Referring physician: Dr Cardenas    Chief complaint/Reason for consult: Afib    HPI: 71 y/o M with presumed NICM EF 45-50%, longstanding persistent afib, and valvular cardiomyopathy. He was sent to Dr Cardenas for evaluation of his moderate to severe mitral regurgitation, and was sent to EP for referral for potential ablation. He feels SOB with exertion. No syncope. No palpitations. Previous cardioversions ~20 yrs ago with recurrent afib days later. Seen by Dr Westbrook 7 years ago and told he should pursue rate control.    ROS (+ highlighted in bold):  Constitutional: Fevers/chills/fatigue/weightloss  HEENT: Blurry vision/eye pain/sore throat/hearing loss  Respiratory: Shortness of breath/cough  Cardiovascular: Chest pain/palpitations/edema/orthopnea/syncope  GI: Nausea/vomitting/diarrhea  MSK: Arthralgias/myagias/muscle weakness  Skin: Rash/sores  Neurological: Numbness/tremors/vertigo  Endocrine: Excessive thirst/polyuria/cold intolerance/heat intolerance  Psych: Depression/anxiety    Past Medical History:   Diagnosis Date   • Arthritis 04/23/2021    Osteo- Fingers/Hands   • Atrial fibrillation (HCC)    • Atrial flutter (HCC)    • Breath shortness 04/23/2021    In the past, is a current problem frequently   • Chickenpox    • Chronic anticoagulation    • Heart burn 04/23/2021    GERD   • Polycystic kidney, unspecified type    • Pure hypercholesterolemia    • Sleep apnea     on oxygen, unable to tolerate CPAP.   • Unspecified essential hypertension    • Unspecified sleep apnea        Past Surgical History:   Procedure Laterality Date   • TONSILLECTOMY  1982   • APPENDECTOMY  1966   • APPENDECTOMY     • OTHER      Cardiac Cath   • OTHER      Kidney Surgery   • OTHER      Nose Surgery   • OTHER      Tonsillectomy with Adenoidectomy       Social History     Socioeconomic History   • Marital status:      Spouse name: Not on file   • Number of children:  Not on file   • Years of education: Not on file   • Highest education level: Not on file   Occupational History   • Not on file   Tobacco Use   • Smoking status: Current Every Day Smoker     Packs/day: 1.00     Types: Cigars   • Smokeless tobacco: Never Used   Vaping Use   • Vaping Use: Never used   Substance and Sexual Activity   • Alcohol use: Not Currently     Comment: moderate. 4/23/21: 1-2/month   • Drug use: No   • Sexual activity: Not on file   Other Topics Concern   • Not on file   Social History Narrative   • Not on file     Social Determinants of Health     Financial Resource Strain:    • Difficulty of Paying Living Expenses: Not on file   Food Insecurity:    • Worried About Running Out of Food in the Last Year: Not on file   • Ran Out of Food in the Last Year: Not on file   Transportation Needs:    • Lack of Transportation (Medical): Not on file   • Lack of Transportation (Non-Medical): Not on file   Physical Activity:    • Days of Exercise per Week: Not on file   • Minutes of Exercise per Session: Not on file   Stress:    • Feeling of Stress : Not on file   Social Connections:    • Frequency of Communication with Friends and Family: Not on file   • Frequency of Social Gatherings with Friends and Family: Not on file   • Attends [employer] Services: Not on file   • Active Member of Clubs or Organizations: Not on file   • Attends Club or Organization Meetings: Not on file   • Marital Status: Not on file   Intimate Partner Violence:    • Fear of Current or Ex-Partner: Not on file   • Emotionally Abused: Not on file   • Physically Abused: Not on file   • Sexually Abused: Not on file   Housing Stability:    • Unable to Pay for Housing in the Last Year: Not on file   • Number of Places Lived in the Last Year: Not on file   • Unstable Housing in the Last Year: Not on file       Family History   Problem Relation Age of Onset   • Heart Disease Neg Hx        No Known Allergies    Current Outpatient Medications  "  Medication Sig Dispense Refill   • potassium chloride ER (KLOR-CON) 10 MEQ tablet Take 10 mEq by mouth every 48 hours.     • tadalafil (CIALIS) 20 MG tablet Take 20 mg by mouth as needed.     • carvedilol (COREG) 12.5 MG Tab Take 1 Tablet by mouth 2 times a day with meals. 180 Tablet 3   • furosemide (LASIX) 80 MG Tab TAKE ONE TABLET BY MOUTH EVERY DAY (Patient taking differently: 2 times a day.) 90 tablet 3   • losartan (COZAAR) 100 MG Tab TAKE ONE TABLET BY MOUTH EVERY DAY 90 tablet 3   • warfarin (COUMADIN) 5 MG Tab Coumadin 5 mg tablet   Take 1 tablet every day by oral route.     • omeprazole (PRILOSEC) 20 MG CPDR Take 20 mg by mouth 2 times a day.     • zolpidem (AMBIEN) 10 MG TABS Take 10 mg by mouth at bedtime as needed.       No current facility-administered medications for this visit.       Physical Exam:  Vitals:    02/09/22 1010   BP: 136/86   BP Location: Left arm   Patient Position: Sitting   BP Cuff Size: Adult   Pulse: 77   SpO2: 93%   Weight: 101 kg (223 lb)   Height: 1.778 m (5' 10\")     General appearance: NAD, conversant   Eyes: anicteric sclerae, moist conjunctivae; no lid-lag; PERRLA  HENT: Atraumatic; oropharynx clear with moist mucous membranes and no mucosal ulcerations; normal hard and soft palate  Neck: Trachea midline; FROM, supple, no thyromegaly or lymphadenopathy  Lungs: CTA, with normal respiratory effort and no intercostal retractions  CV: RRR, no MRGs, no JVD   Abdomen: Soft, non-tender; no masses or HSM  Extremities: No peripheral edema or extremity lymphadenopathy  Skin: Normal temperature, turgor and texture; no rash, ulcers or subcutaneous nodules  Psych: Appropriate affect, alert and oriented to person, place and time    Data:  Lipids:   Lab Results   Component Value Date/Time    CHOLSTRLTOT 176 02/18/2020 05:13 AM    TRIGLYCERIDE 79 02/18/2020 05:13 AM    HDL 52 02/18/2020 05:13 AM     (H) 02/18/2020 05:13 AM        BMP:  Lab Results   Component Value Date/Time    " SODIUM 143 10/11/2021 1334    POTASSIUM 4.2 10/11/2021 1334    CHLORIDE 107 10/11/2021 1334    CO2 24 10/11/2021 1334    GLUCOSE 88 10/11/2021 1334    BUN 29 (H) 10/11/2021 1334    CREATININE 2.31 (H) 10/11/2021 1334    CALCIUM 9.4 10/11/2021 1334    ANION 12.0 10/11/2021 1334        TSH:   No results found for: TSHULTRASEN     THYROXINE (T4):   No results found for: FREEDIR     CBC:   Lab Results   Component Value Date/Time    WBC 8.8 10/11/2021 01:34 PM    RBC 4.82 10/11/2021 01:34 PM    HEMOGLOBIN 14.7 10/11/2021 01:34 PM    HEMATOCRIT 45.0 10/11/2021 01:34 PM    MCV 93.4 10/11/2021 01:34 PM    MCH 30.5 10/11/2021 01:34 PM    MCHC 32.7 (L) 10/11/2021 01:34 PM    RDW 53.9 (H) 10/11/2021 01:34 PM    PLATELETCT 229 10/11/2021 01:34 PM    MPV 11.6 10/11/2021 01:34 PM        CBC w/o DIFF  Lab Results   Component Value Date/Time    WBC 8.8 10/11/2021 01:34 PM    RBC 4.82 10/11/2021 01:34 PM    HEMOGLOBIN 14.7 10/11/2021 01:34 PM    MCV 93.4 10/11/2021 01:34 PM    MCH 30.5 10/11/2021 01:34 PM    MCHC 32.7 (L) 10/11/2021 01:34 PM    RDW 53.9 (H) 10/11/2021 01:34 PM    MPV 11.6 10/11/2021 01:34 PM       Prior echo/stress results reviewed: EF 45-50%, moderate to severe mitral regurgitation    EKG interpreted by me: Afib    Impression/Plan:  1. Atrial fibrillation, chronic (HCC)  EKG    REFERRAL TO CARDIOLOGY     1. Persistent atrial fibrillation  2. NICM  3. Mitral regurgitation  4. OAC management, warfarin    - very longstanding afib  - Low likelihood of successful catheter ablation alone  - Discussed VATS maze hybrid ablation approach. Possibility of mitral annuloplasty at time of maze as well  - Referral to Dr Odom placed, if pt agrees to proceed then schedule second stage ablation with me  - We discussed pulmonary vein isolation for therapeutic management and continued rhythm control.  We discussed the risks and benefits of this procedure.  Risks include 1-3% risk of major cardiovascular event including stroke,  myocardial infarction, phrenic nerve damage, esophageal injury and/or fistula formation, cardiac perforation, pericardial effusion, tamponade, major bleeding, or death.  I quoted a 70 to 80% chance free of atrial fibrillation at 12 months.  We discussed that he may also need a second procedure.    Referral for possible Hybrid Maze     Marco Britt MD  Cardiac Electrophysiology

## 2022-02-11 LAB — EKG IMPRESSION: NORMAL

## 2022-02-15 ENCOUNTER — OFFICE VISIT (OUTPATIENT)
Dept: CARDIOLOGY | Facility: MEDICAL CENTER | Age: 72
End: 2022-02-15
Payer: MEDICARE

## 2022-02-15 VITALS
DIASTOLIC BLOOD PRESSURE: 88 MMHG | RESPIRATION RATE: 16 BRPM | HEIGHT: 70 IN | BODY MASS INDEX: 32.07 KG/M2 | HEART RATE: 62 BPM | OXYGEN SATURATION: 94 % | SYSTOLIC BLOOD PRESSURE: 136 MMHG | WEIGHT: 224 LBS

## 2022-02-15 DIAGNOSIS — I48.20 ATRIAL FIBRILLATION, CHRONIC (HCC): ICD-10-CM

## 2022-02-15 DIAGNOSIS — I10 ESSENTIAL HYPERTENSION: ICD-10-CM

## 2022-02-15 DIAGNOSIS — Z79.01 CHRONIC ANTICOAGULATION: Chronic | ICD-10-CM

## 2022-02-15 DIAGNOSIS — I34.0 MITRAL VALVE INSUFFICIENCY, UNSPECIFIED ETIOLOGY: ICD-10-CM

## 2022-02-15 DIAGNOSIS — I50.20 ACC/AHA STAGE C HEART FAILURE WITH REDUCED EJECTION FRACTION (HCC): ICD-10-CM

## 2022-02-15 DIAGNOSIS — E78.00 PURE HYPERCHOLESTEROLEMIA: ICD-10-CM

## 2022-02-15 PROCEDURE — 99214 OFFICE O/P EST MOD 30 MIN: CPT | Performed by: INTERNAL MEDICINE

## 2022-02-15 RX ORDER — CARVEDILOL 12.5 MG/1
12.5 TABLET ORAL 2 TIMES DAILY WITH MEALS
Qty: 180 TABLET | Refills: 3 | Status: SHIPPED
Start: 2022-02-15 | End: 2022-06-24

## 2022-02-15 ASSESSMENT — ENCOUNTER SYMPTOMS
BRUISES/BLEEDS EASILY: 0
PALPITATIONS: 0
MYALGIAS: 0
SHORTNESS OF BREATH: 0
COUGH: 0
LOSS OF CONSCIOUSNESS: 0
DIZZINESS: 0

## 2022-02-15 ASSESSMENT — FIBROSIS 4 INDEX: FIB4 SCORE: 1.57

## 2022-02-15 NOTE — PROGRESS NOTES
Chief Complaint   Patient presents with   • Congestive Heart Failure     F/V Dx: ACC/AHA stage C heart failure with reduced ejection fraction (HCC)   • Atrial Fibrillation     F/V Dx: Atrial fibrillation, chronic (HCC)   • Shortness of Breath     F/V Dx: SOB (shortness of breath) on exertion       Subjective     Zander Stewart is a 71 y.o. male who presents today for follow-up evaluation of chronic atrial fibrillation, heart failure, chronic anticoagulation, hypertension and hyperlipidemia with a history of CKD, polycystic kidney disease, obstructive sleep apnea with intolerance to CPAP therapy.    Since 11/10/2021 appointment symptomatically improved shortness of breath and LE edema with increase  furosemide 80 mg bid.  BP normal on carvedilol 12.5 mg bid.  Evaluated by Dr. Ta Cardenas for MitraClip, not felt to be a candidate due to mitral regurgitation related to annular dilatation.  Evaluated by SHIRA Bhatti for Afib, referred to [place] for VATS maze hybrid ablation procedure with possible mitral annuloplasty.  Evaluated by Kristin Odom MD CT surgeon, recommended open MV repair with maze procedure.  The patient has yet to commit to surgery, still deciding concerned about success rate and risk benefit.  Reports INR set up through outside MD INR system reporting to PCP with [facility] but is inconsistent in getting back to the patient.    H/O HOWARD diagnosed 20 years ago probably with [facility] but did not tolerate CPAP.  Lost to follow-up.     Previously followed by Dr. Drake Westbrook, electrophysiologist and Dr. Scot Hernández, retired cardiologist    Past Medical History:   Diagnosis Date   • Arthritis 04/23/2021    Osteo- Fingers/Hands   • Atrial fibrillation (HCC)    • Atrial flutter (HCC)    • Breath shortness 04/23/2021    In the past, is a current problem frequently   • Chickenpox    • Chronic anticoagulation    • Heart burn 04/23/2021    GERD   •  Polycystic kidney, unspecified type    • Pure hypercholesterolemia    • Sleep apnea     on oxygen, unable to tolerate CPAP.   • Unspecified essential hypertension    • Unspecified sleep apnea      Past Surgical History:   Procedure Laterality Date   • TONSILLECTOMY  1982   • APPENDECTOMY  1966   • APPENDECTOMY     • OTHER      Cardiac Cath   • OTHER      Kidney Surgery   • OTHER      Nose Surgery   • OTHER      Tonsillectomy with Adenoidectomy     Family History   Problem Relation Age of Onset   • Heart Disease Neg Hx      Social History     Socioeconomic History   • Marital status:      Spouse name: Not on file   • Number of children: Not on file   • Years of education: Not on file   • Highest education level: Not on file   Occupational History   • Not on file   Tobacco Use   • Smoking status: Current Every Day Smoker     Packs/day: 1.00     Types: Cigars   • Smokeless tobacco: Never Used   Vaping Use   • Vaping Use: Never used   Substance and Sexual Activity   • Alcohol use: Yes     Comment: rarely   • Drug use: No   • Sexual activity: Not on file   Other Topics Concern   • Not on file   Social History Narrative   • Not on file     Social Determinants of Health     Financial Resource Strain:    • Difficulty of Paying Living Expenses: Not on file   Food Insecurity:    • Worried About Running Out of Food in the Last Year: Not on file   • Ran Out of Food in the Last Year: Not on file   Transportation Needs:    • Lack of Transportation (Medical): Not on file   • Lack of Transportation (Non-Medical): Not on file   Physical Activity:    • Days of Exercise per Week: Not on file   • Minutes of Exercise per Session: Not on file   Stress:    • Feeling of Stress : Not on file   Social Connections:    • Frequency of Communication with Friends and Family: Not on file   • Frequency of Social Gatherings with Friends and Family: Not on file   • Attends Oriental orthodox Services: Not on file   • Active Member of Clubs or  "Organizations: Not on file   • Attends Club or Organization Meetings: Not on file   • Marital Status: Not on file   Intimate Partner Violence:    • Fear of Current or Ex-Partner: Not on file   • Emotionally Abused: Not on file   • Physically Abused: Not on file   • Sexually Abused: Not on file   Housing Stability:    • Unable to Pay for Housing in the Last Year: Not on file   • Number of Places Lived in the Last Year: Not on file   • Unstable Housing in the Last Year: Not on file     No Known Allergies  Outpatient Encounter Medications as of 2/15/2022   Medication Sig Dispense Refill   • carvedilol (COREG) 12.5 MG Tab Take 1 Tablet by mouth 2 times a day with meals. 180 Tablet 3   • potassium chloride ER (KLOR-CON) 10 MEQ tablet Take 10 mEq by mouth every 48 hours.     • tadalafil (CIALIS) 20 MG tablet Take 20 mg by mouth as needed.     • furosemide (LASIX) 80 MG Tab TAKE ONE TABLET BY MOUTH EVERY DAY (Patient taking differently: 2 times a day.) 90 tablet 3   • losartan (COZAAR) 100 MG Tab TAKE ONE TABLET BY MOUTH EVERY DAY 90 tablet 3   • warfarin (COUMADIN) 5 MG Tab Take 5 mg by mouth every day.     • omeprazole (PRILOSEC) 20 MG CPDR Take 20 mg by mouth 2 times a day.     • zolpidem (AMBIEN) 10 MG TABS Take 10 mg by mouth at bedtime as needed.     • [DISCONTINUED] carvedilol (COREG) 12.5 MG Tab Take 1 Tablet by mouth 2 times a day with meals. 180 Tablet 3     No facility-administered encounter medications on file as of 2/15/2022.     Review of Systems   Respiratory: Negative for cough and shortness of breath.    Cardiovascular: Negative for chest pain and palpitations.   Musculoskeletal: Negative for myalgias.   Neurological: Negative for dizziness and loss of consciousness.   Endo/Heme/Allergies: Does not bruise/bleed easily.              Objective     /88 (BP Location: Left arm, Patient Position: Sitting, BP Cuff Size: Adult)   Pulse 62   Resp 16   Ht 1.778 m (5' 10\")   Wt 102 kg (224 lb)   SpO2 94%  "  BMI 32.14 kg/m²     Physical Exam  Vitals reviewed.   Constitutional:       General: He is not in acute distress.     Appearance: He is well-developed.   Eyes:      Conjunctiva/sclera: Conjunctivae normal.      Pupils: Pupils are equal, round, and reactive to light.   Neck:      Vascular: No JVD.      Comments: Normal at 90 degrees.  Cardiovascular:      Rate and Rhythm: Normal rate. Rhythm irregular.      Pulses:           Carotid pulses are 2+ on the right side and 2+ on the left side.     Heart sounds: Murmur heard.   No friction rub. No gallop.    Pulmonary:      Effort: Pulmonary effort is normal. No accessory muscle usage or respiratory distress.      Breath sounds: Normal breath sounds. No wheezing or rales.   Abdominal:      General: There is no distension.      Palpations: Abdomen is soft. There is no mass.      Tenderness: There is no abdominal tenderness.      Comments: Markedly protuberant possible ascites.   Musculoskeletal:      Cervical back: Normal range of motion and neck supple.      Comments: Chronic dermatologic changes lower extremities.  No pitting edema.   Skin:     General: Skin is warm and dry.      Findings: No rash.      Nails: There is no clubbing.   Neurological:      Mental Status: He is alert and oriented to person, place, and time.   Psychiatric:         Behavior: Behavior normal.            CARDIAC CATHETERIZATION 7/17/1997.  1.  Left ventricular ejection fraction 64%.  2.  Normal coronary arteries.    ECHOCARDIOGRAM 06/06/2013  Left ejection fraction 55-60%.  Mild concentric left ventricular hypertrophy.  Mild mitral regurgitation.  Severe left atrial dilatation.  Moderate right atrial dilatation.     ECHOCARDIOGRAM 09/02/2020  No prior study is available for comparison.   Normal left ventricular chamber size.  Mildly reduced left ventricular systolic function.  Left ventricular ejection fraction is visually estimated to be 45-50%.  Aortic sclerosis without stenosis.  Mild mitral  regurgitation.  Normal right ventricular size and systolic function.  Right heart pressures are normal.  Severely dilated left atrium.  Rythym is atrial fibrillation.     TRANSESOPHAGEAL ECHOCARDIOGRAM 4/27/2021.  Mild to moderately reduced left ventricular systolic function.  Left ventricular ejection fraction is visually estimated to be 40-45%.  Mild aortic insufficiency.  Mild degenerative mitral valve leaftets.  Moderate mitral regurgitation, central with 2 jets.    ECHOCARDIOGRAM 12/8/2021  Normal left ventricular chamber size.  Mildly reduced left ventricular systolic function.  The left ventricular ejection fraction is visually estimated to be 50%;   variable due to atrial fibrillation.  Mild concentric left ventricular hypertrophy.  Aortic sclerosis without stenosis.  Mild aortic insufficiency.  Moderate mitral regurgitation; possibly more severe.  Estimated right ventricular systolic pressure is 65 mmHg.  Dilated right ventricle with reduced right ventricular systolic   function.  Severe biatrial enlargement.  Rhythm is atrial fibrillation.  Compared to prior echo on 12/08/2020; no significant change.       EKG 04/25/2013 atrial fibrillation, rate 83.    Assessment & Plan     1. Atrial fibrillation, chronic (HCC)  carvedilol (COREG) 12.5 MG Tab   2. ACC/AHA stage C heart failure with reduced ejection fraction (HCC)     3. Mitral valve insufficiency, unspecified etiology     4. Chronic anticoagulation     5. Essential hypertension  carvedilol (COREG) 12.5 MG Tab   6. Pure hypercholesterolemia  LIPID PANEL       Medical Decision Making: Today's Assessment/Status/Plan:   Assessment  1.  Atrial fibrillation, chronic  2.  Systolic CHF, EF 45-50%.  3.  Mitral regurgitation.  4.  Anticoagulation.  On warfarin.  5.  Pulmonary hypertension.     6.  Hypertension.  7.  Dyslipidemia.  8.  HOWARD on CPAP.  9.  CKD.  10.  Obesity.  11.  LE edema aggravated by diltiazem.  12.  H/O HOWARD, diagnosed 20 years ago, currently  untreated.     Recommendation and recommendation  1.  HFrEF 40-50%: Symptomatically improved with increased diuretic dose, NYHA class II stage C, continue furosemide, carvedilol, losartan.  2.  Atrial fibrillation, permanent: Rate controlled, continue carvedilol, warfarin.  3.  Mitral regurgitation: See above recommendation, I had a lengthy detailed discussion regarding risks, benefits and potential outcomes, expectations regarding quality of life in deciding whether or not to proceed with mitral valve surgery.  4.  Anticoagulation: Followed by PCP and [facility] via [facility] POC system, after discussion with patient will refer to [facility] anticoagulation clinic to see if [facility].  5.  Dyslipidemia: Recheck lipid panel.  6.  RTC 3 months.

## 2022-03-09 ENCOUNTER — HOSPITAL ENCOUNTER (OUTPATIENT)
Dept: LAB | Facility: MEDICAL CENTER | Age: 72
End: 2022-03-09
Attending: INTERNAL MEDICINE
Payer: MEDICARE

## 2022-03-09 DIAGNOSIS — I10 ESSENTIAL HYPERTENSION: ICD-10-CM

## 2022-03-09 LAB
ANION GAP SERPL CALC-SCNC: 13 MMOL/L (ref 7–16)
BUN SERPL-MCNC: 31 MG/DL (ref 8–22)
CALCIUM SERPL-MCNC: 9.7 MG/DL (ref 8.5–10.5)
CHLORIDE SERPL-SCNC: 107 MMOL/L (ref 96–112)
CHOLEST SERPL-MCNC: 153 MG/DL (ref 100–199)
CO2 SERPL-SCNC: 25 MMOL/L (ref 20–33)
CREAT SERPL-MCNC: 2.43 MG/DL (ref 0.5–1.4)
FASTING STATUS PATIENT QL REPORTED: NORMAL
GLUCOSE SERPL-MCNC: 85 MG/DL (ref 65–99)
HDLC SERPL-MCNC: 43 MG/DL
LDLC SERPL CALC-MCNC: 94 MG/DL
POTASSIUM SERPL-SCNC: 4.3 MMOL/L (ref 3.6–5.5)
SODIUM SERPL-SCNC: 145 MMOL/L (ref 135–145)
TRIGL SERPL-MCNC: 78 MG/DL (ref 0–149)

## 2022-03-09 PROCEDURE — 80048 BASIC METABOLIC PNL TOTAL CA: CPT

## 2022-03-09 PROCEDURE — 80061 LIPID PANEL: CPT

## 2022-03-09 PROCEDURE — 36415 COLL VENOUS BLD VENIPUNCTURE: CPT

## 2022-03-10 DIAGNOSIS — I10 HTN (HYPERTENSION), MALIGNANT: ICD-10-CM

## 2022-03-11 RX ORDER — LOSARTAN POTASSIUM 100 MG/1
TABLET ORAL
Qty: 90 TABLET | Refills: 3 | Status: SHIPPED
Start: 2022-03-11 | End: 2022-06-24

## 2022-04-26 ENCOUNTER — HOSPITAL ENCOUNTER (OUTPATIENT)
Dept: LAB | Facility: MEDICAL CENTER | Age: 72
End: 2022-04-26
Attending: NURSE PRACTITIONER
Payer: MEDICARE

## 2022-04-26 ENCOUNTER — HOSPITAL ENCOUNTER (OUTPATIENT)
Dept: RADIOLOGY | Facility: MEDICAL CENTER | Age: 72
End: 2022-04-26
Attending: THORACIC SURGERY (CARDIOTHORACIC VASCULAR SURGERY)
Payer: MEDICARE

## 2022-04-26 ENCOUNTER — HOSPITAL ENCOUNTER (OUTPATIENT)
Dept: LAB | Facility: MEDICAL CENTER | Age: 72
End: 2022-04-26
Attending: THORACIC SURGERY (CARDIOTHORACIC VASCULAR SURGERY)
Payer: MEDICARE

## 2022-04-26 ENCOUNTER — HOSPITAL ENCOUNTER (OUTPATIENT)
Dept: CARDIOLOGY | Facility: MEDICAL CENTER | Age: 72
End: 2022-04-26
Attending: THORACIC SURGERY (CARDIOTHORACIC VASCULAR SURGERY)
Payer: MEDICARE

## 2022-04-26 DIAGNOSIS — Z98.890 PERSONAL HISTORY OF SURGERY TO HEART AND GREAT VESSELS, PRESENTING HAZARDS TO HEALTH: ICD-10-CM

## 2022-04-26 DIAGNOSIS — I25.10 ATHEROSCLEROSIS OF NATIVE CORONARY ARTERY, UNSPECIFIED WHETHER ANGINA PRESENT, UNSPECIFIED WHETHER NATIVE OR TRANSPLANTED HEART: ICD-10-CM

## 2022-04-26 LAB
ALBUMIN SERPL BCP-MCNC: 4.1 G/DL (ref 3.2–4.9)
ALBUMIN/GLOB SERPL: 1.6 G/DL
ALP SERPL-CCNC: 138 U/L (ref 30–99)
ALT SERPL-CCNC: 7 U/L (ref 2–50)
ANION GAP SERPL CALC-SCNC: 13 MMOL/L (ref 7–16)
ANION GAP SERPL CALC-SCNC: 14 MMOL/L (ref 7–16)
AST SERPL-CCNC: 11 U/L (ref 12–45)
BASOPHILS # BLD AUTO: 0.9 % (ref 0–1.8)
BASOPHILS # BLD: 0.09 K/UL (ref 0–0.12)
BILIRUB SERPL-MCNC: 1.2 MG/DL (ref 0.1–1.5)
BUN SERPL-MCNC: 37 MG/DL (ref 8–22)
BUN SERPL-MCNC: 37 MG/DL (ref 8–22)
CALCIUM SERPL-MCNC: 9.5 MG/DL (ref 8.5–10.5)
CALCIUM SERPL-MCNC: 9.6 MG/DL (ref 8.5–10.5)
CHLORIDE SERPL-SCNC: 107 MMOL/L (ref 96–112)
CHLORIDE SERPL-SCNC: 107 MMOL/L (ref 96–112)
CO2 SERPL-SCNC: 22 MMOL/L (ref 20–33)
CO2 SERPL-SCNC: 23 MMOL/L (ref 20–33)
CREAT SERPL-MCNC: 2.81 MG/DL (ref 0.5–1.4)
CREAT SERPL-MCNC: 2.86 MG/DL (ref 0.5–1.4)
EKG IMPRESSION: NORMAL
EOSINOPHIL # BLD AUTO: 0.36 K/UL (ref 0–0.51)
EOSINOPHIL NFR BLD: 3.6 % (ref 0–6.9)
ERYTHROCYTE [DISTWIDTH] IN BLOOD BY AUTOMATED COUNT: 53.8 FL (ref 35.9–50)
GFR SERPLBLD CREATININE-BSD FMLA CKD-EPI: 23 ML/MIN/1.73 M 2
GFR SERPLBLD CREATININE-BSD FMLA CKD-EPI: 23 ML/MIN/1.73 M 2
GLOBULIN SER CALC-MCNC: 2.6 G/DL (ref 1.9–3.5)
GLUCOSE SERPL-MCNC: 104 MG/DL (ref 65–99)
GLUCOSE SERPL-MCNC: 107 MG/DL (ref 65–99)
HCT VFR BLD AUTO: 36.1 % (ref 42–52)
HGB BLD-MCNC: 11.1 G/DL (ref 14–18)
IMM GRANULOCYTES # BLD AUTO: 0.04 K/UL (ref 0–0.11)
IMM GRANULOCYTES NFR BLD AUTO: 0.4 % (ref 0–0.9)
LYMPHOCYTES # BLD AUTO: 1.2 K/UL (ref 1–4.8)
LYMPHOCYTES NFR BLD: 12.2 % (ref 22–41)
MCH RBC QN AUTO: 29.9 PG (ref 27–33)
MCHC RBC AUTO-ENTMCNC: 30.7 G/DL (ref 33.7–35.3)
MCV RBC AUTO: 97.3 FL (ref 81.4–97.8)
MONOCYTES # BLD AUTO: 0.83 K/UL (ref 0–0.85)
MONOCYTES NFR BLD AUTO: 8.4 % (ref 0–13.4)
NEUTROPHILS # BLD AUTO: 7.35 K/UL (ref 1.82–7.42)
NEUTROPHILS NFR BLD: 74.5 % (ref 44–72)
NRBC # BLD AUTO: 0 K/UL
NRBC BLD-RTO: 0 /100 WBC
PLATELET # BLD AUTO: 273 K/UL (ref 164–446)
PMV BLD AUTO: 10.7 FL (ref 9–12.9)
POTASSIUM SERPL-SCNC: 4.1 MMOL/L (ref 3.6–5.5)
POTASSIUM SERPL-SCNC: 4.2 MMOL/L (ref 3.6–5.5)
PROT SERPL-MCNC: 6.7 G/DL (ref 6–8.2)
RBC # BLD AUTO: 3.71 M/UL (ref 4.7–6.1)
SODIUM SERPL-SCNC: 143 MMOL/L (ref 135–145)
SODIUM SERPL-SCNC: 143 MMOL/L (ref 135–145)
WBC # BLD AUTO: 9.9 K/UL (ref 4.8–10.8)

## 2022-04-26 PROCEDURE — 80048 BASIC METABOLIC PNL TOTAL CA: CPT

## 2022-04-26 PROCEDURE — 71046 X-RAY EXAM CHEST 2 VIEWS: CPT

## 2022-04-26 PROCEDURE — 85025 COMPLETE CBC W/AUTO DIFF WBC: CPT

## 2022-04-26 PROCEDURE — 93010 ELECTROCARDIOGRAM REPORT: CPT | Performed by: INTERNAL MEDICINE

## 2022-04-26 PROCEDURE — 36415 COLL VENOUS BLD VENIPUNCTURE: CPT

## 2022-04-26 PROCEDURE — 93005 ELECTROCARDIOGRAM TRACING: CPT | Performed by: THORACIC SURGERY (CARDIOTHORACIC VASCULAR SURGERY)

## 2022-04-26 PROCEDURE — 80053 COMPREHEN METABOLIC PANEL: CPT

## 2022-05-10 ENCOUNTER — PATIENT MESSAGE (OUTPATIENT)
Dept: CARDIOLOGY | Facility: MEDICAL CENTER | Age: 72
End: 2022-05-10
Payer: MEDICARE

## 2022-05-11 ENCOUNTER — TELEPHONE (OUTPATIENT)
Dept: CARDIOLOGY | Facility: MEDICAL CENTER | Age: 72
End: 2022-05-11
Payer: MEDICARE

## 2022-05-11 ENCOUNTER — PATIENT MESSAGE (OUTPATIENT)
Dept: CARDIOLOGY | Facility: MEDICAL CENTER | Age: 72
End: 2022-05-11
Payer: MEDICARE

## 2022-05-13 ENCOUNTER — TELEPHONE (OUTPATIENT)
Dept: CARDIOLOGY | Facility: MEDICAL CENTER | Age: 72
End: 2022-05-13
Payer: MEDICARE

## 2022-05-13 NOTE — TELEPHONE ENCOUNTER
Patient Message    5/11/2022  [facility]     Eugene Calderón M.D.    Cardiovascular Disease (Cardiology)       Conversation: Pacemaker monitor  (Oldest Message First)  May 10, 2022    Zander Stewart  to Eugene Calderón M.D.          1:53 PM     Dear Dr. Calderón:  As part of my surgery on March 25 in [place], a pacemaker was installed.  Can I put your practice as the the office to monitor/adjust the unit as needed?  It is a [employer] device.  Please have your office contact me through Portal or call me at [phone] if this is okay.     Thanks!  Maury Medina, Med Ass't  to Alia Rose R.N. DR      3:21 PM  Reuben Gan, pt was last seen by ROMIE. Please advise thank you!       Alia Rose R.N.  to aZnder Stewart          3:25 PM  Reuben Fermin,     What hospital did you have your procedure at? Do you have their phone or fax number? Please call [phone] to schedule a pacer check to get established with our clinic.      Thank you,     Malik RN    Last read by Zander Stewart at 11:48 AM on 5/11/2022.  May 11, 2022    Zander Stewart  to Alia Rose R.N.          11:59 AM  Yes, hospital is [facility] in [facility];  phone number is   [phone].   Thank you!      Alia Rose R.N.         1:46 PM  Note    Routed to Melissa ORTIZ MA.         Alia Rose R.N.  to Me          1:46 PM  Please request all records for hospital stay. Thank you!

## 2022-06-15 ENCOUNTER — NON-PROVIDER VISIT (OUTPATIENT)
Dept: CARDIOLOGY | Facility: MEDICAL CENTER | Age: 72
End: 2022-06-15
Payer: MEDICARE

## 2022-06-15 DIAGNOSIS — Z95.0 CARDIAC PACEMAKER IN SITU: ICD-10-CM

## 2022-06-15 DIAGNOSIS — I49.5 SICK SINUS SYNDROME (HCC): ICD-10-CM

## 2022-06-15 DIAGNOSIS — I48.20 ATRIAL FIBRILLATION, CHRONIC (HCC): ICD-10-CM

## 2022-06-15 PROCEDURE — 93279 PRGRMG DEV EVAL PM/LDLS PM: CPT | Performed by: INTERNAL MEDICINE

## 2022-06-24 ENCOUNTER — TELEPHONE (OUTPATIENT)
Dept: CARDIOLOGY | Facility: MEDICAL CENTER | Age: 72
End: 2022-06-24

## 2022-06-24 ENCOUNTER — OFFICE VISIT (OUTPATIENT)
Dept: CARDIOLOGY | Facility: MEDICAL CENTER | Age: 72
End: 2022-06-24
Payer: MEDICARE

## 2022-06-24 VITALS
HEART RATE: 92 BPM | HEIGHT: 70 IN | WEIGHT: 237 LBS | SYSTOLIC BLOOD PRESSURE: 132 MMHG | BODY MASS INDEX: 33.93 KG/M2 | RESPIRATION RATE: 16 BRPM | OXYGEN SATURATION: 93 % | DIASTOLIC BLOOD PRESSURE: 90 MMHG

## 2022-06-24 DIAGNOSIS — I10 ESSENTIAL HYPERTENSION: Chronic | ICD-10-CM

## 2022-06-24 DIAGNOSIS — R60.0 BILATERAL LEG EDEMA: ICD-10-CM

## 2022-06-24 DIAGNOSIS — Z98.890 S/P TRICUSPID VALVE REPAIR: ICD-10-CM

## 2022-06-24 DIAGNOSIS — I50.30 ACC/AHA STAGE C HEART FAILURE WITH PRESERVED EJECTION FRACTION (HCC): ICD-10-CM

## 2022-06-24 DIAGNOSIS — I10 ESSENTIAL HYPERTENSION: ICD-10-CM

## 2022-06-24 DIAGNOSIS — Z98.890 S/P MITRAL VALVE REPAIR: ICD-10-CM

## 2022-06-24 DIAGNOSIS — Z98.890 S/P MVR (MITRAL VALVE REPAIR): ICD-10-CM

## 2022-06-24 DIAGNOSIS — Z95.1 S/P CABG (CORONARY ARTERY BYPASS GRAFT): ICD-10-CM

## 2022-06-24 DIAGNOSIS — I50.31 ACUTE HEART FAILURE WITH PRESERVED EJECTION FRACTION (HFPEF) (HCC): ICD-10-CM

## 2022-06-24 DIAGNOSIS — E78.00 PURE HYPERCHOLESTEROLEMIA: Chronic | ICD-10-CM

## 2022-06-24 DIAGNOSIS — I25.10 CORONARY ARTERY DISEASE, OCCLUSIVE: ICD-10-CM

## 2022-06-24 DIAGNOSIS — I48.20 ATRIAL FIBRILLATION, CHRONIC (HCC): ICD-10-CM

## 2022-06-24 PROCEDURE — 99215 OFFICE O/P EST HI 40 MIN: CPT | Performed by: INTERNAL MEDICINE

## 2022-06-24 RX ORDER — ASPIRIN 81 MG/1
81 TABLET ORAL DAILY
COMMUNITY

## 2022-06-24 ASSESSMENT — ENCOUNTER SYMPTOMS
PALPITATIONS: 0
COUGH: 0
BRUISES/BLEEDS EASILY: 0
SHORTNESS OF BREATH: 0
MYALGIAS: 0
LOSS OF CONSCIOUSNESS: 0
DIZZINESS: 0

## 2022-06-24 ASSESSMENT — FIBROSIS 4 INDEX: FIB4 SCORE: 1.1

## 2022-06-24 NOTE — TELEPHONE ENCOUNTER
ROMIE      Caller: Zander Stewart    Medication Name and Dosage: Terosemide  Did patient contact pharmacy (If no, please call pharmacy first): yes. They didn't receive it  Medication amount left: 0  Preferred Pharmacy: [facility] on file  Other questions (Topic): n/a  Callback Number (Will only call for issues): [phone] (home)       Thank you    -William WEAVER

## 2022-06-24 NOTE — TELEPHONE ENCOUNTER
Faxed request to [facility] for medical records per Dr. Calderón.  Ph: [phone]  Fax: [phone]

## 2022-06-24 NOTE — PROGRESS NOTES
Chief Complaint   Patient presents with   • Atrial Fibrillation     F/v Dx: Atrial fibrillation, chronic (HCC)   • Congestive Heart Failure     F/V Dx: ACC/AHA stage C heart failure with reduced ejection fraction (HCC)   • Hypertension       Subjective     Zander Stewart is a 72 y.o. male who presents today for follow-up evaluation of chronic atrial fibrillation, heart failure, chronic anticoagulation, hypertension and hyperlipidemia with a history of CKD, polycystic kidney disease, obstructive sleep apnea with intolerance to CPAP therapy.    Here today with his daughter.    Since 2/15/2022 appointment the patient underwent MV repair (32 mm ring), TV repair (30 mm ring), CABG LIMA-LAD, Maze procedure and JANINE excision 3/25/2022 [facility], [place], [place] subsequent PPM 4/1/2022.  Here for follow-up.  Discharged on aspirin, metoprolol, furosemide 40 mg bid over the past 2 weeks has had progressive bilateral LE edema with abdominal swelling, some orthopnea and generalized fatigue and exercise intolerance with some shortness of breath.  Sleeping between his bed and in his recliner.  Has not followed up with nephrology.  Taking furosemide 40 mg twice daily.    H/O HOWARD diagnosed 20 years ago probably with [facility] but did not tolerate CPAP.  Lost to follow-up.     Previously followed by Dr. Drake Westbrook, electrophysiologist and Dr. Scot Hernández, retired cardiologist    Past Medical History:   Diagnosis Date   • Arthritis 04/23/2021    Osteo- Fingers/Hands   • Atrial fibrillation (HCC)    • Atrial flutter (HCC)    • Breath shortness 04/23/2021    In the past, is a current problem frequently   • Chickenpox    • Chronic anticoagulation    • Heart burn 04/23/2021    GERD   • Polycystic kidney, unspecified type    • Pure hypercholesterolemia    • Sleep apnea     on oxygen, unable to tolerate CPAP.   • Unspecified essential hypertension    • Unspecified sleep apnea      Past Surgical History:    Procedure Laterality Date   • TONSILLECTOMY  1982   • APPENDECTOMY  1966   • APPENDECTOMY     • OTHER      Cardiac Cath   • OTHER      Kidney Surgery   • OTHER      Nose Surgery   • OTHER      Tonsillectomy with Adenoidectomy     Family History   Problem Relation Age of Onset   • Heart Disease Neg Hx      Social History     Socioeconomic History   • Marital status:      Spouse name: Not on file   • Number of children: Not on file   • Years of education: Not on file   • Highest education level: Not on file   Occupational History   • Not on file   Tobacco Use   • Smoking status: Current Every Day Smoker     Packs/day: 1.00     Types: Cigars   • Smokeless tobacco: Never Used   Vaping Use   • Vaping Use: Never used   Substance and Sexual Activity   • Alcohol use: Yes     Comment: rarely   • Drug use: No   • Sexual activity: Not on file   Other Topics Concern   • Not on file   Social History Narrative   • Not on file     Social Determinants of Health     Financial Resource Strain: Not on file   Food Insecurity: Not on file   Transportation Needs: Not on file   Physical Activity: Not on file   Stress: Not on file   Social Connections: Not on file   Intimate Partner Violence: Not on file   Housing Stability: Not on file     No Known Allergies  Outpatient Encounter Medications as of 6/24/2022   Medication Sig Dispense Refill   • metoprolol tartrate (LOPRESSOR) 25 MG Tab Take 12.5 mg by mouth 2 times a day.     • aspirin 81 MG EC tablet Take 81 mg by mouth every day.     • potassium chloride ER (KLOR-CON) 10 MEQ tablet Take 10 mEq by mouth every 48 hours.     • [DISCONTINUED] furosemide (LASIX) 80 MG Tab TAKE ONE TABLET BY MOUTH EVERY DAY (Patient taking differently: Take 40 mg by mouth every day.) 90 tablet 3   • omeprazole (PRILOSEC) 20 MG CPDR Take 20 mg by mouth 2 times a day.     • zolpidem (AMBIEN) 10 MG TABS Take 10 mg by mouth at bedtime as needed.     • [DISCONTINUED] losartan (COZAAR) 100 MG Tab TAKE ONE  "TABLET BY MOUTH ONCE DAILY (Patient not taking: Reported on 6/24/2022) 90 Tablet 3   • [DISCONTINUED] carvedilol (COREG) 12.5 MG Tab Take 1 Tablet by mouth 2 times a day with meals. (Patient not taking: Reported on 6/24/2022) 180 Tablet 3   • [DISCONTINUED] tadalafil (CIALIS) 20 MG tablet Take 20 mg by mouth as needed. (Patient not taking: Reported on 6/24/2022)     • [DISCONTINUED] warfarin (COUMADIN) 5 MG Tab Take 5 mg by mouth every day. (Patient not taking: Reported on 6/24/2022)       No facility-administered encounter medications on file as of 6/24/2022.     Review of Systems   Respiratory: Negative for cough and shortness of breath.    Cardiovascular: Negative for chest pain and palpitations.   Musculoskeletal: Negative for myalgias.   Neurological: Negative for dizziness and loss of consciousness.   Endo/Heme/Allergies: Does not bruise/bleed easily.              Objective     BP (!) 132/90 (BP Location: Left arm, Patient Position: Sitting, BP Cuff Size: Adult)   Pulse 92   Resp 16   Ht 1.778 m (5' 10\")   Wt 108 kg (237 lb)   SpO2 93%   BMI 34.01 kg/m²     Physical Exam  Vitals reviewed.   Constitutional:       General: He is not in acute distress.     Appearance: He is well-developed.   Eyes:      Conjunctiva/sclera: Conjunctivae normal.      Pupils: Pupils are equal, round, and reactive to light.   Neck:      Vascular: No JVD.      Comments: Normal at 90 degrees.  Cardiovascular:      Rate and Rhythm: Normal rate. Rhythm irregular.      Pulses:           Carotid pulses are 2+ on the right side and 2+ on the left side.     Heart sounds: No murmur heard.    No friction rub. No gallop.      Comments: Midline sternal scar  Pulmonary:      Effort: Pulmonary effort is normal. No accessory muscle usage or respiratory distress.      Breath sounds: Normal breath sounds. No wheezing or rales.   Abdominal:      General: There is no distension.      Palpations: Abdomen is soft. There is no mass.      Tenderness: " There is no abdominal tenderness.      Comments: Markedly protuberant possible ascites.   Musculoskeletal:      Cervical back: Normal range of motion and neck supple.      Right lower leg: Edema present.      Left lower leg: Edema present.   Skin:     General: Skin is warm and dry.      Findings: No rash.      Nails: There is no clubbing.   Neurological:      Mental Status: He is alert and oriented to person, place, and time.   Psychiatric:         Behavior: Behavior normal.            CARDIAC CATHETERIZATION 7/17/1997.  1.  Left ventricular ejection fraction 64%.  2.  Normal coronary arteries.    ECHOCARDIOGRAM 06/06/2013  Left ejection fraction 55-60%.  Mild concentric left ventricular hypertrophy.  Mild mitral regurgitation.  Severe left atrial dilatation.  Moderate right atrial dilatation.     ECHOCARDIOGRAM 09/02/2020  No prior study is available for comparison.   Normal left ventricular chamber size.  Mildly reduced left ventricular systolic function.  Left ventricular ejection fraction is visually estimated to be 45-50%.  Aortic sclerosis without stenosis.  Mild mitral regurgitation.  Normal right ventricular size and systolic function.  Right heart pressures are normal.  Severely dilated left atrium.  Rythym is atrial fibrillation.     TRANSESOPHAGEAL ECHOCARDIOGRAM 4/27/2021.  Mild to moderately reduced left ventricular systolic function.  Left ventricular ejection fraction is visually estimated to be 40-45%.  Mild aortic insufficiency.  Mild degenerative mitral valve leaftets.  Moderate mitral regurgitation, central with 2 jets.    ECHOCARDIOGRAM 12/8/2021  Normal left ventricular chamber size.  Mildly reduced left ventricular systolic function.  The left ventricular ejection fraction is visually estimated to be 50%;   variable due to atrial fibrillation.  Mild concentric left ventricular hypertrophy.  Aortic sclerosis without stenosis.  Mild aortic insufficiency.  Moderate mitral regurgitation; possibly  more severe.  Estimated right ventricular systolic pressure is 65 mmHg.  Dilated right ventricle with reduced right ventricular systolic   function.  Severe biatrial enlargement.  Rhythm is atrial fibrillation.  Compared to prior echo on 12/08/2020; no significant change.       EKG 04/25/2013 atrial fibrillation, rate 83.    Assessment & Plan     1. Acute heart failure with preserved ejection fraction (HFpEF) (HCC)  Comp Metabolic Panel    CBC WITHOUT DIFFERENTIAL    TSH+FREE T4   2. Bilateral leg edema  US-EXTREMITY VENOUS LOWER BILAT   3. S/P mitral valve repair  EC-ECHOCARDIOGRAM COMPLETE W/O CONT   4. S/P tricuspid valve repair  EC-ECHOCARDIOGRAM COMPLETE W/O CONT   5. Atrial fibrillation, chronic (HCC)     6. ACC/AHA stage C heart failure with preserved ejection fraction (HCC)     7. Pure hypercholesterolemia     8. Essential hypertension     9. S/P MVR (mitral valve repair)     10. S/P CABG (coronary artery bypass graft)     11. Coronary artery disease, occlusive         Medical Decision Making: Today's Assessment/Status/Plan:   Assessment  1.  Atrial fibrillation, chronic  2.  Systolic CHF, EF 45-50%.  3.  Mitral regurgitation.  4.  Anticoagulation.  On warfarin.  5.  Pulmonary hypertension.     6.  Hypertension.  7.  Dyslipidemia.  8.  HOWARD on CPAP.  9.  CKD.  10.  Obesity.  11.  LE edema aggravated by diltiazem.  12.  H/O HOWARD, diagnosed 20 years ago, currently untreated.     Recommendation and recommendation  1.  HFpEF 40-50%: Decompensated, biventricular, instructed patient to switch to torsemide 40 mg bid, minimize fluid intake, daily weights, report back 3 days on diuretic response, if not improved will require hospitalization for IV diuretic therapy, follow-up CMP CBC TFTs, follow-up echocardiogram  2.  Atrial fibrillation, permanent: Post Maze, EKG 4/26/2022 atrial fibrillation rate controlled, now on metoprolol, no anticoagulation after JANINE excision, consider future rhythm strategy once HFpEF  compensated.  3.  S/P MV repair, TR repair, CABG LIMA-LAD, JANINE excision, Maze  4.  CAD: Clinically stable, on aspirin, previously on simvastatin, will recheck lipid panel and restart statin therapy afterwards.  5.  Hypertension: Losartan DC'd postoperatively, instructed the patient monitor BP daily maintaining a log to be reviewed at next appointment, check renal function, eventually will restart antihypertensive therapy.  6.  Dyslipidemia: Recheck lipid panel.  7.  Bilateral LE edema: Venous ultrasound to rule out DVT.  8.  Instructed patient to contact nephrology for follow-up, may need assistance for fluid management.  9.  RTC 2 weeks.    Complex patient and problems requiring assessment, management of new problem, management of new medications, coordination of care, personal communication, review of diagnostic data and medical records, ordering multiple tests and most important spending time communicating with the patient and his daughter about his multiple cardiovascular problems and plan of care.

## 2022-06-24 NOTE — TELEPHONE ENCOUNTER
----- Message from Eugene Calderón M.D. sent at 6/24/2022  3:24 PM PDT -----  Regarding: Medical records  Una  Would you please get the medical records from [facility]  Had cardiac surgery on 3/25/2022 + pacemaker on 4/1/2022  Would request discharge summary, cardiac surgery report and pacemaker report  Thank you

## 2022-06-27 NOTE — TELEPHONE ENCOUNTER
ROMIE      Caller: Zander Stewart    Topic/issue: Patient was calling if there was any news about his new prescription for the Terosemide that was sent to his pharmacy. He called the pharmacy and they said that they still don't have it.  Callback Number: [phone] (home)       Thank you    -William WEAVER

## 2022-07-01 ENCOUNTER — HOSPITAL ENCOUNTER (OUTPATIENT)
Dept: LAB | Facility: MEDICAL CENTER | Age: 72
End: 2022-07-01
Attending: INTERNAL MEDICINE
Payer: MEDICARE

## 2022-07-01 ENCOUNTER — HOSPITAL ENCOUNTER (OUTPATIENT)
Dept: RADIOLOGY | Facility: MEDICAL CENTER | Age: 72
End: 2022-07-01
Attending: INTERNAL MEDICINE
Payer: MEDICARE

## 2022-07-01 DIAGNOSIS — R60.0 BILATERAL LEG EDEMA: ICD-10-CM

## 2022-07-01 DIAGNOSIS — I50.31 ACUTE HEART FAILURE WITH PRESERVED EJECTION FRACTION (HFPEF) (HCC): ICD-10-CM

## 2022-07-01 LAB
ALBUMIN SERPL BCP-MCNC: 4.1 G/DL (ref 3.2–4.9)
ALBUMIN/GLOB SERPL: 1.3 G/DL
ALP SERPL-CCNC: 142 U/L (ref 30–99)
ALT SERPL-CCNC: 6 U/L (ref 2–50)
ANION GAP SERPL CALC-SCNC: 14 MMOL/L (ref 7–16)
AST SERPL-CCNC: 9 U/L (ref 12–45)
BILIRUB SERPL-MCNC: 1.1 MG/DL (ref 0.1–1.5)
BUN SERPL-MCNC: 44 MG/DL (ref 8–22)
CALCIUM SERPL-MCNC: 9.4 MG/DL (ref 8.4–10.2)
CHLORIDE SERPL-SCNC: 105 MMOL/L (ref 96–112)
CO2 SERPL-SCNC: 24 MMOL/L (ref 20–33)
CREAT SERPL-MCNC: 3.03 MG/DL (ref 0.5–1.4)
ERYTHROCYTE [DISTWIDTH] IN BLOOD BY AUTOMATED COUNT: 49.8 FL (ref 35.9–50)
FASTING STATUS PATIENT QL REPORTED: NORMAL
GFR SERPLBLD CREATININE-BSD FMLA CKD-EPI: 21 ML/MIN/1.73 M 2
GLOBULIN SER CALC-MCNC: 3.1 G/DL (ref 1.9–3.5)
GLUCOSE SERPL-MCNC: 93 MG/DL (ref 65–99)
HCT VFR BLD AUTO: 38.8 % (ref 42–52)
HGB BLD-MCNC: 11.5 G/DL (ref 14–18)
MCH RBC QN AUTO: 25 PG (ref 27–33)
MCHC RBC AUTO-ENTMCNC: 29.6 G/DL (ref 33.7–35.3)
MCV RBC AUTO: 84.3 FL (ref 81.4–97.8)
PLATELET # BLD AUTO: 334 K/UL (ref 164–446)
PMV BLD AUTO: 10.3 FL (ref 9–12.9)
POTASSIUM SERPL-SCNC: 4 MMOL/L (ref 3.6–5.5)
PROT SERPL-MCNC: 7.2 G/DL (ref 6–8.2)
RBC # BLD AUTO: 4.6 M/UL (ref 4.7–6.1)
SODIUM SERPL-SCNC: 143 MMOL/L (ref 135–145)
T4 FREE SERPL-MCNC: 1.22 NG/DL (ref 0.93–1.7)
TSH SERPL DL<=0.005 MIU/L-ACNC: 12.94 UIU/ML (ref 0.38–5.33)
WBC # BLD AUTO: 9.3 K/UL (ref 4.8–10.8)

## 2022-07-01 PROCEDURE — 84439 ASSAY OF FREE THYROXINE: CPT

## 2022-07-01 PROCEDURE — 84443 ASSAY THYROID STIM HORMONE: CPT

## 2022-07-01 PROCEDURE — 85027 COMPLETE CBC AUTOMATED: CPT

## 2022-07-01 PROCEDURE — 80053 COMPREHEN METABOLIC PANEL: CPT

## 2022-07-01 PROCEDURE — 93970 EXTREMITY STUDY: CPT

## 2022-07-01 PROCEDURE — 93970 EXTREMITY STUDY: CPT | Mod: 26 | Performed by: INTERNAL MEDICINE

## 2022-07-01 PROCEDURE — 36415 COLL VENOUS BLD VENIPUNCTURE: CPT

## 2022-07-06 ENCOUNTER — OFFICE VISIT (OUTPATIENT)
Dept: CARDIOLOGY | Facility: MEDICAL CENTER | Age: 72
End: 2022-07-06
Payer: MEDICARE

## 2022-07-06 VITALS
DIASTOLIC BLOOD PRESSURE: 104 MMHG | HEART RATE: 111 BPM | WEIGHT: 235 LBS | SYSTOLIC BLOOD PRESSURE: 160 MMHG | BODY MASS INDEX: 33.64 KG/M2 | RESPIRATION RATE: 18 BRPM | OXYGEN SATURATION: 98 % | HEIGHT: 70 IN

## 2022-07-06 DIAGNOSIS — I10 ESSENTIAL HYPERTENSION: Chronic | ICD-10-CM

## 2022-07-06 DIAGNOSIS — N18.32 STAGE 3B CHRONIC KIDNEY DISEASE: ICD-10-CM

## 2022-07-06 DIAGNOSIS — Z98.890 S/P MVR (MITRAL VALVE REPAIR): ICD-10-CM

## 2022-07-06 DIAGNOSIS — Z95.1 S/P CABG (CORONARY ARTERY BYPASS GRAFT): ICD-10-CM

## 2022-07-06 DIAGNOSIS — I48.20 ATRIAL FIBRILLATION, CHRONIC (HCC): ICD-10-CM

## 2022-07-06 DIAGNOSIS — I50.31 ACUTE HEART FAILURE WITH PRESERVED EJECTION FRACTION (HFPEF) (HCC): ICD-10-CM

## 2022-07-06 DIAGNOSIS — I25.10 CORONARY ARTERY DISEASE, OCCLUSIVE: ICD-10-CM

## 2022-07-06 DIAGNOSIS — Z98.890 S/P TRICUSPID VALVE REPAIR: ICD-10-CM

## 2022-07-06 LAB — EKG IMPRESSION: NORMAL

## 2022-07-06 PROCEDURE — 99214 OFFICE O/P EST MOD 30 MIN: CPT | Performed by: INTERNAL MEDICINE

## 2022-07-06 PROCEDURE — 93000 ELECTROCARDIOGRAM COMPLETE: CPT | Performed by: INTERNAL MEDICINE

## 2022-07-06 RX ORDER — TORSEMIDE 20 MG/1
40 TABLET ORAL 2 TIMES DAILY
COMMUNITY
Start: 2022-06-27 | End: 2023-03-06

## 2022-07-06 RX ORDER — METOLAZONE 2.5 MG/1
2.5 TABLET ORAL DAILY
Qty: 30 TABLET | Refills: 11 | Status: SHIPPED | OUTPATIENT
Start: 2022-07-06 | End: 2023-08-29

## 2022-07-06 RX ORDER — LOSARTAN POTASSIUM 25 MG/1
25 TABLET ORAL DAILY
Qty: 90 TABLET | Refills: 3 | Status: SHIPPED
Start: 2022-07-06 | End: 2023-03-23

## 2022-07-06 ASSESSMENT — ENCOUNTER SYMPTOMS
PALPITATIONS: 0
LOSS OF CONSCIOUSNESS: 0
COUGH: 0
DIZZINESS: 0
SHORTNESS OF BREATH: 0
MYALGIAS: 0
BRUISES/BLEEDS EASILY: 0

## 2022-07-06 ASSESSMENT — FIBROSIS 4 INDEX: FIB4 SCORE: 0.79

## 2022-07-06 NOTE — PROGRESS NOTES
Chief Complaint   Patient presents with   • Atrial Fibrillation     F/V Dx: Atrial fibrillation, chronic (HCC)     • Anticoagulation     F/V Dx: Chronic anticoagulation   • Hyperlipidemia     F/V Dx: Pure hypercholesterolemia         Subjective     Zander Stewart is a 72 y.o. male who presents today for follow-up evaluation of chronic atrial fibrillation, heart failure, chronic anticoagulation, hypertension and hyperlipidemia with a history of CKD, polycystic kidney disease, obstructive sleep apnea with intolerance to CPAP therapy.    Presents with his daughter today.    Since 6/24/2022 appointment the patient has had no major diuretic improvement with transition from furosemide to torsemide.  Labs shows GFR 21.    Since 2/15/2022 appointment the patient underwent MV repair (32 mm ring), TV repair (30 mm ring), CABG LIMA-LAD, Maze procedure and JANINE excision 3/25/2022 [facility], [place], [place] subsequent PPM 4/1/2022.  Here for follow-up.  Discharged on aspirin, metoprolol, furosemide 40 mg bid over the past 2 weeks has had progressive bilateral LE edema with abdominal swelling, some orthopnea and generalized fatigue and exercise intolerance with some shortness of breath.  Sleeping between his bed and in his recliner.  Has not followed up with nephrology.  Taking furosemide 40 mg twice daily.    H/O HOWARD diagnosed 20 years ago probably with [facility] but did not tolerate CPAP.  Lost to follow-up.     Previously followed by Dr. Drake Westbrook, electrophysiologist and Dr. Scot Hernández, retired cardiologist    Past Medical History:   Diagnosis Date   • Arthritis 04/23/2021    Osteo- Fingers/Hands   • Atrial fibrillation (HCC)    • Atrial flutter (HCC)    • Breath shortness 04/23/2021    In the past, is a current problem frequently   • Chickenpox    • Chronic anticoagulation    • Heart burn 04/23/2021    GERD   • Polycystic kidney, unspecified type    • Pure hypercholesterolemia    • Sleep  apnea     on oxygen, unable to tolerate CPAP.   • Unspecified essential hypertension    • Unspecified sleep apnea      Past Surgical History:   Procedure Laterality Date   • TONSILLECTOMY  1982   • APPENDECTOMY  1966   • APPENDECTOMY     • OTHER      Cardiac Cath   • OTHER      Kidney Surgery   • OTHER      Nose Surgery   • OTHER      Tonsillectomy with Adenoidectomy     Family History   Problem Relation Age of Onset   • Heart Disease Neg Hx      Social History     Socioeconomic History   • Marital status:      Spouse name: Not on file   • Number of children: Not on file   • Years of education: Not on file   • Highest education level: Not on file   Occupational History   • Not on file   Tobacco Use   • Smoking status: Current Every Day Smoker     Packs/day: 1.00     Types: Cigars   • Smokeless tobacco: Never Used   Vaping Use   • Vaping Use: Never used   Substance and Sexual Activity   • Alcohol use: Yes     Comment: rarely   • Drug use: No   • Sexual activity: Not on file   Other Topics Concern   • Not on file   Social History Narrative   • Not on file     Social Determinants of Health     Financial Resource Strain: Not on file   Food Insecurity: Not on file   Transportation Needs: Not on file   Physical Activity: Not on file   Stress: Not on file   Social Connections: Not on file   Intimate Partner Violence: Not on file   Housing Stability: Not on file     No Known Allergies  Outpatient Encounter Medications as of 7/6/2022   Medication Sig Dispense Refill   • torsemide (DEMADEX) 20 MG Tab Take 40 mg by mouth 2 times a day.     • metOLazone (ZAROXOLYN) 2.5 MG Tab Take 1 Tablet by mouth every day. 30 Tablet 11   • losartan (COZAAR) 25 MG Tab Take 1 Tablet by mouth every day. 90 Tablet 3   • metoprolol tartrate (LOPRESSOR) 25 MG Tab Take 12.5 mg by mouth 2 times a day.     • aspirin 81 MG EC tablet Take 81 mg by mouth every day.     • potassium chloride ER (KLOR-CON) 10 MEQ tablet Take 10 mEq by mouth every 48  "hours.     • omeprazole (PRILOSEC) 20 MG CPDR Take 20 mg by mouth 2 times a day.     • zolpidem (AMBIEN) 10 MG TABS Take 10 mg by mouth at bedtime as needed.     • [DISCONTINUED] torsemide 40 MG Tab Take 40 mg by mouth 2 times a day. (Patient not taking: Reported on 7/6/2022) 60 Tablet 5     No facility-administered encounter medications on file as of 7/6/2022.     Review of Systems   Respiratory: Negative for cough and shortness of breath.    Cardiovascular: Negative for chest pain and palpitations.   Musculoskeletal: Negative for myalgias.   Neurological: Negative for dizziness and loss of consciousness.   Endo/Heme/Allergies: Does not bruise/bleed easily.              Objective     BP (!) 160/104 (BP Location: Left arm, Patient Position: Sitting, BP Cuff Size: Adult)   Pulse (!) 111   Resp 18   Ht 1.778 m (5' 10\")   Wt 107 kg (235 lb)   SpO2 98%   BMI 33.72 kg/m²     Physical Exam  Vitals reviewed.   Constitutional:       General: He is not in acute distress.     Appearance: He is well-developed.   Eyes:      Conjunctiva/sclera: Conjunctivae normal.      Pupils: Pupils are equal, round, and reactive to light.   Neck:      Vascular: No JVD.      Comments: Normal at 90 degrees.  Cardiovascular:      Rate and Rhythm: Normal rate. Rhythm irregular.      Pulses:           Carotid pulses are 2+ on the right side and 2+ on the left side.     Heart sounds: Murmur heard.     No friction rub. No gallop.   Pulmonary:      Effort: Pulmonary effort is normal. No accessory muscle usage or respiratory distress.      Breath sounds: Normal breath sounds. No wheezing or rales.   Abdominal:      General: There is no distension.      Palpations: Abdomen is soft. There is no mass.      Tenderness: There is no abdominal tenderness.      Comments: Markedly protuberant possible ascites.   Musculoskeletal:      Cervical back: Normal range of motion and neck supple.      Right lower leg: Edema present.      Left lower leg: Edema " present.   Skin:     General: Skin is warm and dry.      Findings: No rash.      Nails: There is no clubbing.   Neurological:      Mental Status: He is alert and oriented to person, place, and time.   Psychiatric:         Behavior: Behavior normal.            CARDIAC CATHETERIZATION 7/17/1997.  1.  Left ventricular ejection fraction 64%.  2.  Normal coronary arteries.    ECHOCARDIOGRAM 06/06/2013  Left ejection fraction 55-60%.  Mild concentric left ventricular hypertrophy.  Mild mitral regurgitation.  Severe left atrial dilatation.  Moderate right atrial dilatation.     ECHOCARDIOGRAM 09/02/2020  No prior study is available for comparison.   Normal left ventricular chamber size.  Mildly reduced left ventricular systolic function.  Left ventricular ejection fraction is visually estimated to be 45-50%.  Aortic sclerosis without stenosis.  Mild mitral regurgitation.  Normal right ventricular size and systolic function.  Right heart pressures are normal.  Severely dilated left atrium.  Rythym is atrial fibrillation.     TRANSESOPHAGEAL ECHOCARDIOGRAM 4/27/2021.  Mild to moderately reduced left ventricular systolic function.  Left ventricular ejection fraction is visually estimated to be 40-45%.  Mild aortic insufficiency.  Mild degenerative mitral valve leaftets.  Moderate mitral regurgitation, central with 2 jets.    ECHOCARDIOGRAM 12/8/2021  Normal left ventricular chamber size.  Mildly reduced left ventricular systolic function.  The left ventricular ejection fraction is visually estimated to be 50%;   variable due to atrial fibrillation.  Mild concentric left ventricular hypertrophy.  Aortic sclerosis without stenosis.  Mild aortic insufficiency.  Moderate mitral regurgitation; possibly more severe.  Estimated right ventricular systolic pressure is 65 mmHg.  Dilated right ventricle with reduced right ventricular systolic   function.  Severe biatrial enlargement.  Rhythm is atrial fibrillation.  Compared to prior  echo on 12/08/2020; no significant change.       EKG 04/25/2013 atrial fibrillation, rate 83.    EKG 7/6/2022 atrial fibrillation, rate 106, personally interpreted.    Assessment & Plan     1. Atrial fibrillation, chronic ([facility])  EKG   2. Acute heart failure with preserved ejection fraction (HFpEF) ([facility])  Basic Metabolic Panel   3. S/P CABG (coronary artery bypass graft)     4. Coronary artery disease, occlusive     5. S/P MVR (mitral valve repair)     6. Stage 3b chronic kidney disease ([facility])  Referral to Nephrology   7. S/P tricuspid valve repair     8. Essential hypertension         Medical Decision Making: Today's Assessment/Status/Plan:   Assessment  1.  Atrial fibrillation, chronic  2.  HFpEF, EF 45-50%.  3.  S/P MV repair (32 mm ring), TV repair (30 mm ring), CABG LIMA-LAD, Maze procedure and JANINE excision 3/25/2022 [place], [place]   4.  Anticoagulation.  On warfarin.  5.  PPM 4/1/2022 [facility]  6.  Hypertension.  7.  Dyslipidemia.  8.  HOWARD on CPAP.  9.  CKD.  10.  Obesity.  11.  LE edema aggravated by diltiazem.  12.  H/O HOWARD, diagnosed 20 years ago, currently untreated.     Recommendation and recommendation  1.  HFpEF 40-50%: Decompensated, biventricular, add metolazone, renal function precludes spironolactone or SGLT2 inhibitor therapy, add losartan 25 mg daily, previously on 100 mg pre-MV repair, weekly BMP, if no improvement at follow-up appointment 7/13 would recommend hospitalization for IV diuretic therapy.  2.  Atrial fibrillation, permanent: Post Maze, continue metoprolol, no anticoagulation after JANINE excision, consider future rhythm restoration once HFpEF optimized  3.  S/P MV repair, TR repair, CABG LIMA-LAD, JANINE excision, Maze  4.  CAD: Clinically stable, on aspirin, restart statin therapy with atorvastatin.  5.  Hypertension: Start losartan 25 mg daily, weekly BMP  6.  Dyslipidemia: Recheck lipid panel.  7.  Bilateral LE edema: Negative venous  ultrasound.  8.  CKD: Nephrology consultation submitted.  9.  PPM: 6/15/2022 interrogation 32% V paced.  10.  RTC 1-2 weeks.

## 2022-07-12 NOTE — TELEPHONE ENCOUNTER
Received fax back today asking for Dr. Ngo to complete record request. Sent fax again, pt had cardiac surgery with Dr. Odom at [facility]. Informed them pt has appt tomorrow, need asap.

## 2022-07-13 ENCOUNTER — APPOINTMENT (OUTPATIENT)
Dept: CARDIOLOGY | Facility: MEDICAL CENTER | Age: 72
End: 2022-07-13
Attending: INTERNAL MEDICINE
Payer: MEDICARE

## 2022-07-13 ENCOUNTER — OFFICE VISIT (OUTPATIENT)
Dept: CARDIOLOGY | Facility: MEDICAL CENTER | Age: 72
End: 2022-07-13
Payer: MEDICARE

## 2022-07-13 VITALS
HEART RATE: 104 BPM | RESPIRATION RATE: 12 BRPM | OXYGEN SATURATION: 92 % | DIASTOLIC BLOOD PRESSURE: 70 MMHG | HEIGHT: 70 IN | BODY MASS INDEX: 31.5 KG/M2 | SYSTOLIC BLOOD PRESSURE: 108 MMHG | WEIGHT: 220 LBS

## 2022-07-13 DIAGNOSIS — I48.20 ATRIAL FIBRILLATION, CHRONIC (HCC): ICD-10-CM

## 2022-07-13 DIAGNOSIS — I50.30 ACC/AHA STAGE C HEART FAILURE WITH PRESERVED EJECTION FRACTION (HCC): ICD-10-CM

## 2022-07-13 PROCEDURE — 99214 OFFICE O/P EST MOD 30 MIN: CPT | Performed by: INTERNAL MEDICINE

## 2022-07-13 ASSESSMENT — ENCOUNTER SYMPTOMS
DIZZINESS: 0
BRUISES/BLEEDS EASILY: 0
PALPITATIONS: 0
COUGH: 0
MYALGIAS: 0
LOSS OF CONSCIOUSNESS: 0
SHORTNESS OF BREATH: 0

## 2022-07-13 ASSESSMENT — FIBROSIS 4 INDEX: FIB4 SCORE: 0.79

## 2022-07-13 NOTE — PROGRESS NOTES
Chief Complaint   Patient presents with   • Atrial Fibrillation     F/V Dx: Atrial fibrillation, chronic (HCC)   • CHF (Acute)     F/v Dx: Acute heart failure with preserved ejection fraction (HFpEF) (HCC)   • Coronary Artery Disease       Subjective     Zander Stewart is a 72 y.o. male who presents today for follow-up evaluation of chronic atrial fibrillation, heart failure, chronic anticoagulation, hypertension and hyperlipidemia with a history of CKD, polycystic kidney disease, obstructive sleep apnea with intolerance to CPAP therapy.    Presents with his wife today    Since 7/6/2022 appointment the patient has improved in response to addition of metolazone to torsemide, 15 pound weight loss, symptomatically improved with reduction in LE edema and some shortness of breath.  Has scheduled follow-up with nephrology next week.     Since 2/15/2022 appointment the patient underwent MV repair (32 mm ring), TV repair (30 mm ring), CABG LIMA-LAD, Maze procedure and JANINE excision 3/25/2022 [facility], [place], [place] subsequent PPM 4/1/2022.  Here for follow-up.  Discharged on aspirin, metoprolol, furosemide 40 mg bid over the past 2 weeks has had progressive bilateral LE edema with abdominal swelling, some orthopnea and generalized fatigue and exercise intolerance with some shortness of breath.  Sleeping between his bed and in his recliner.  Has not followed up with nephrology.  Taking furosemide 40 mg twice daily.    H/O HOWARD diagnosed 20 years ago probably with [facility] but did not tolerate CPAP.  Lost to follow-up.     Previously followed by Dr. Drake Westbrook, electrophysiologist and Dr. Scot Hernández, retired cardiologist    Past Medical History:   Diagnosis Date   • Arthritis 04/23/2021    Osteo- Fingers/Hands   • Atrial fibrillation (HCC)    • Atrial flutter (HCC)    • Breath shortness 04/23/2021    In the past, is a current problem frequently   • Chickenpox    • Chronic  anticoagulation    • Heart burn 04/23/2021    GERD   • Polycystic kidney, unspecified type    • Pure hypercholesterolemia    • Sleep apnea     on oxygen, unable to tolerate CPAP.   • Unspecified essential hypertension    • Unspecified sleep apnea      Past Surgical History:   Procedure Laterality Date   • TONSILLECTOMY  1982   • APPENDECTOMY  1966   • APPENDECTOMY     • OTHER      Cardiac Cath   • OTHER      Kidney Surgery   • OTHER      Nose Surgery   • OTHER      Tonsillectomy with Adenoidectomy     Family History   Problem Relation Age of Onset   • Heart Disease Neg Hx      Social History     Socioeconomic History   • Marital status:      Spouse name: Not on file   • Number of children: Not on file   • Years of education: Not on file   • Highest education level: Not on file   Occupational History   • Not on file   Tobacco Use   • Smoking status: Current Every Day Smoker     Packs/day: 1.00     Types: Cigars   • Smokeless tobacco: Never Used   Vaping Use   • Vaping Use: Never used   Substance and Sexual Activity   • Alcohol use: Yes     Comment: rarely   • Drug use: No   • Sexual activity: Not on file   Other Topics Concern   • Not on file   Social History Narrative   • Not on file     Social Determinants of Health     Financial Resource Strain: Not on file   Food Insecurity: Not on file   Transportation Needs: Not on file   Physical Activity: Not on file   Stress: Not on file   Social Connections: Not on file   Intimate Partner Violence: Not on file   Housing Stability: Not on file     No Known Allergies  Outpatient Encounter Medications as of 7/13/2022   Medication Sig Dispense Refill   • torsemide (DEMADEX) 20 MG Tab Take 40 mg by mouth 2 times a day.     • metOLazone (ZAROXOLYN) 2.5 MG Tab Take 1 Tablet by mouth every day. 30 Tablet 11   • losartan (COZAAR) 25 MG Tab Take 1 Tablet by mouth every day. 90 Tablet 3   • metoprolol tartrate (LOPRESSOR) 25 MG Tab Take 12.5 mg by mouth 2 times a day.     •  "aspirin 81 MG EC tablet Take 81 mg by mouth every day.     • potassium chloride ER (KLOR-CON) 10 MEQ tablet Take 10 mEq by mouth every 48 hours.     • omeprazole (PRILOSEC) 20 MG CPDR Take 20 mg by mouth 2 times a day.     • zolpidem (AMBIEN) 10 MG TABS Take 10 mg by mouth at bedtime as needed.       No facility-administered encounter medications on file as of 7/13/2022.     Review of Systems   Respiratory: Negative for cough and shortness of breath.    Cardiovascular: Negative for chest pain and palpitations.   Musculoskeletal: Negative for myalgias.   Neurological: Negative for dizziness and loss of consciousness.   Endo/Heme/Allergies: Does not bruise/bleed easily.              Objective     /70 (BP Location: Left arm, Patient Position: Sitting, BP Cuff Size: Adult)   Pulse (!) 104   Resp 12   Ht 1.778 m (5' 10\")   Wt 99.8 kg (220 lb)   SpO2 92%   BMI 31.57 kg/m²     Physical Exam  Vitals reviewed.   Constitutional:       General: He is not in acute distress.     Appearance: He is well-developed.   Eyes:      Conjunctiva/sclera: Conjunctivae normal.      Pupils: Pupils are equal, round, and reactive to light.   Neck:      Vascular: No JVD.      Comments: Appears normal at 90 degrees.  Cardiovascular:      Rate and Rhythm: Normal rate. Rhythm irregular.      Pulses:           Carotid pulses are 2+ on the right side and 2+ on the left side.     Heart sounds: Murmur heard.     No friction rub. No gallop.   Pulmonary:      Effort: Pulmonary effort is normal. No accessory muscle usage or respiratory distress.      Breath sounds: Normal breath sounds. No wheezing or rales.   Abdominal:      Palpations: Abdomen is soft.      Comments: Protuberant.   Musculoskeletal:      Cervical back: Normal range of motion and neck supple.      Right lower leg: Edema present.      Left lower leg: Edema present.      Comments: Improved compared to prior exam   Skin:     General: Skin is warm and dry.      Findings: No rash. "      Nails: There is no clubbing.   Neurological:      Mental Status: He is alert and oriented to person, place, and time.   Psychiatric:         Behavior: Behavior normal.            CARDIAC CATHETERIZATION 7/17/1997.  1.  Left ventricular ejection fraction 64%.  2.  Normal coronary arteries.    ECHOCARDIOGRAM 06/06/2013  Left ejection fraction 55-60%.  Mild concentric left ventricular hypertrophy.  Mild mitral regurgitation.  Severe left atrial dilatation.  Moderate right atrial dilatation.     ECHOCARDIOGRAM 09/02/2020  No prior study is available for comparison.   Normal left ventricular chamber size.  Mildly reduced left ventricular systolic function.  Left ventricular ejection fraction is visually estimated to be 45-50%.  Aortic sclerosis without stenosis.  Mild mitral regurgitation.  Normal right ventricular size and systolic function.  Right heart pressures are normal.  Severely dilated left atrium.  Rythym is atrial fibrillation.     TRANSESOPHAGEAL ECHOCARDIOGRAM 4/27/2021.  Mild to moderately reduced left ventricular systolic function.  Left ventricular ejection fraction is visually estimated to be 40-45%.  Mild aortic insufficiency.  Mild degenerative mitral valve leaftets.  Moderate mitral regurgitation, central with 2 jets.    ECHOCARDIOGRAM 12/8/2021  Normal left ventricular chamber size.  Mildly reduced left ventricular systolic function.  The left ventricular ejection fraction is visually estimated to be 50%;   variable due to atrial fibrillation.  Mild concentric left ventricular hypertrophy.  Aortic sclerosis without stenosis.  Mild aortic insufficiency.  Moderate mitral regurgitation; possibly more severe.  Estimated right ventricular systolic pressure is 65 mmHg.  Dilated right ventricle with reduced right ventricular systolic   function.  Severe biatrial enlargement.  Rhythm is atrial fibrillation.  Compared to prior echo on 12/08/2020; no significant change.       EKG 04/25/2013 atrial  fibrillation, rate 83.    EKG 7/6/2022 atrial fibrillation, rate 106, personally interpreted.    Assessment & Plan     1. ACC/AHA stage C heart failure with preserved ejection fraction (HCC)     2. Atrial fibrillation, chronic (HCC)  REFERRAL TO CARDIOLOGY    Comp Metabolic Panel       Medical Decision Making: Today's Assessment/Status/Plan:   Assessment  1.  Atrial fibrillation, chronic  2.  HFpEF, EF 45-50%.  3.  S/P MV repair (32 mm ring), TV repair (30 mm ring), CABG LIMA-LAD, Maze procedure and JANINE excision 3/25/2022 [place], [place]   4.  Anticoagulation, on warfarin.  5.  PPM 4/1/2022 [facility]  6.  Hypertension.  7.  Dyslipidemia.  8.  HOWARD on CPAP.  9.  CKD.  10.  Obesity.  11.  LE edema aggravated by diltiazem.  12.  H/O HOWARD, diagnosed 20 years ago, currently untreated.     Recommendation and recommendation  1.  HFpEF 40-50%: Improved on furosemide plus metolazone, weight down 15 pounds, target weight 210 or resolution of LE edema, biventricular, if renal function stabilizes consider addition of spironolactone or SGLT2 inhibitor therapy, continue losartan 25 mg daily, recheck BMP.  2.  Atrial fibrillation, persistent: Post Maze, continue metoprolol, no anticoagulation after JANINE excision, will refer back to EP for rhythm control recommendations.  3.  S/P MV repair, TR repair, CABG LIMA-LAD, JANINE excision, Maze, echocardiogram pending  4.  CAD: Clinically stable, sinew aspirin, atorvastatin, metoprolol  5.  Hypertension: Normal, at goal, continue losartan 25 mg daily, weekly BMP  6.  Dyslipidemia: Continue atorvastatin  7.  Bilateral LE edema: Improved with diuresis, negative venous ultrasound.  8.  CKD: Nephrology follow-up pending  9.  PPM: 6/15/2022 interrogation 32% V paced.  10.  RTC 1 month.

## 2022-07-20 ENCOUNTER — OFFICE VISIT (OUTPATIENT)
Dept: NEPHROLOGY | Facility: MEDICAL CENTER | Age: 72
End: 2022-07-20
Payer: MEDICARE

## 2022-07-20 VITALS
RESPIRATION RATE: 14 BRPM | TEMPERATURE: 97.5 F | HEART RATE: 110 BPM | SYSTOLIC BLOOD PRESSURE: 140 MMHG | DIASTOLIC BLOOD PRESSURE: 80 MMHG | WEIGHT: 212 LBS | OXYGEN SATURATION: 90 % | BODY MASS INDEX: 30.42 KG/M2

## 2022-07-20 DIAGNOSIS — R60.9 EDEMA, UNSPECIFIED TYPE: ICD-10-CM

## 2022-07-20 DIAGNOSIS — Q61.3 POLYCYSTIC KIDNEY: ICD-10-CM

## 2022-07-20 DIAGNOSIS — N18.4 STAGE 4 CHRONIC KIDNEY DISEASE (HCC): ICD-10-CM

## 2022-07-20 DIAGNOSIS — I10 ESSENTIAL HYPERTENSION: ICD-10-CM

## 2022-07-20 DIAGNOSIS — I42.9 CARDIOMYOPATHY, UNSPECIFIED TYPE (HCC): ICD-10-CM

## 2022-07-20 PROCEDURE — 99214 OFFICE O/P EST MOD 30 MIN: CPT | Performed by: INTERNAL MEDICINE

## 2022-07-20 ASSESSMENT — ENCOUNTER SYMPTOMS
FEVER: 0
COUGH: 0
SHORTNESS OF BREATH: 0
VOMITING: 0
NAUSEA: 0
HYPERTENSION: 1
CHILLS: 0

## 2022-07-20 ASSESSMENT — FIBROSIS 4 INDEX: FIB4 SCORE: 0.79

## 2022-07-20 NOTE — PROGRESS NOTES
Subjective     Zander Stewart is a 72 y.o. male who presents with Hypertension and Chronic Kidney Disease            Patient has a history of chronic kidney disease secondary to autosomal dominant polycystic kidney disease.  Recently diagnosed with cardiomyopathy    Hypertension  This is a chronic problem. The current episode started more than 1 year ago. The problem has been waxing and waning since onset. The problem is uncontrolled. Associated symptoms include peripheral edema. Pertinent negatives include no chest pain, malaise/fatigue or shortness of breath. Risk factors for coronary artery disease include male gender. Past treatments include diuretics and angiotensin blockers. The current treatment provides moderate improvement. Compliance problems include diet.  Hypertensive end-organ damage includes kidney disease. Identifiable causes of hypertension include chronic renal disease.   Chronic Kidney Disease  This is a chronic problem. The current episode started more than 1 year ago. The problem occurs constantly. The problem has been gradually worsening. Pertinent negatives include no chest pain, chills, coughing, fever, nausea, urinary symptoms or vomiting.       Review of Systems   Constitutional: Negative for chills, fever and malaise/fatigue.   Respiratory: Negative for cough and shortness of breath.    Cardiovascular: Positive for leg swelling. Negative for chest pain.   Gastrointestinal: Negative for nausea and vomiting.   Genitourinary: Negative for dysuria, frequency and urgency.              Objective     BP (!) 140/80   Pulse (!) 110   Temp 36.4 °C (97.5 °F) (Temporal)   Resp 14   Wt 96.2 kg (212 lb)   SpO2 90%   BMI 30.42 kg/m²      Physical Exam  Vitals and nursing note reviewed.   Constitutional:       General: He is not in acute distress.     Appearance: He is not ill-appearing.   HENT:      Head: Normocephalic and atraumatic.      Right Ear: External ear normal.      Left Ear:  External ear normal.      Nose: Nose normal.   Eyes:      General:         Right eye: No discharge.         Left eye: No discharge.      Conjunctiva/sclera: Conjunctivae normal.   Cardiovascular:      Rate and Rhythm: Normal rate and regular rhythm.      Heart sounds: No murmur heard.  Pulmonary:      Effort: Pulmonary effort is normal. No respiratory distress.      Breath sounds: Normal breath sounds. No wheezing.   Musculoskeletal:         General: No tenderness or deformity.      Right lower leg: No edema.      Left lower leg: No edema.   Skin:     General: Skin is warm.   Neurological:      General: No focal deficit present.      Mental Status: He is alert and oriented to person, place, and time.   Psychiatric:         Mood and Affect: Mood normal.         Behavior: Behavior normal.       Past Medical History:   Diagnosis Date   • Arthritis 04/23/2021    Osteo- Fingers/Hands   • Atrial fibrillation (HCC)    • Atrial flutter (HCC)    • Breath shortness 04/23/2021    In the past, is a current problem frequently   • Chickenpox    • Chronic anticoagulation    • Heart burn 04/23/2021    GERD   • Polycystic kidney, unspecified type    • Pure hypercholesterolemia    • Sleep apnea     on oxygen, unable to tolerate CPAP.   • Unspecified essential hypertension    • Unspecified sleep apnea      Social History     Socioeconomic History   • Marital status:      Spouse name: Not on file   • Number of children: Not on file   • Years of education: Not on file   • Highest education level: Not on file   Occupational History   • Not on file   Tobacco Use   • Smoking status: Current Every Day Smoker     Packs/day: 1.00     Types: Cigars   • Smokeless tobacco: Never Used   Vaping Use   • Vaping Use: Never used   Substance and Sexual Activity   • Alcohol use: Yes     Comment: rarely   • Drug use: No   • Sexual activity: Not on file   Other Topics Concern   • Not on file   Social History Narrative   • Not on file     Social  Determinants of Health     Financial Resource Strain: Not on file   Food Insecurity: Not on file   Transportation Needs: Not on file   Physical Activity: Not on file   Stress: Not on file   Social Connections: Not on file   Intimate Partner Violence: Not on file   Housing Stability: Not on file     Family History   Problem Relation Age of Onset   • Heart Disease Neg Hx      Recent Labs     03/09/22  0907 04/26/22  1300 04/26/22  1301 07/01/22  1255   ALBUMIN  --  4.1  --  4.1   HDL 43  --   --   --    TRIGLYCERIDE 78  --   --   --    SODIUM 145 143 143 143   POTASSIUM 4.3 4.2 4.1 4.0   CHLORIDE 107 107 107 105   CO2 25 22 23 24   BUN 31* 37* 37* 44*   CREATININE 2.43* 2.86* 2.81* 3.03*                             Assessment & Plan        1. Stage 4 chronic kidney disease (HCC)  Worsening  No uremic symptoms  Renal dose of medication  Avoid nephrotoxins  Continue same medication regimen  No acute need for dialysis  Referral for dialysis education    2. Essential hypertension  Uncontrolled  Advised the patient to be on low-sodium diet  Increase torsemide dose    3. Edema, unspecified type  Low-sodium diet  Increase torsemide dose to 40 mg twice daily(patient was taking at 40 mg daily)  Patient was advised to take metolazone 20 to 30 minutes before morning torsemide dose    4. Polycystic kidney    5. Cardiomyopathy, unspecified type (HCC)  Optimize cardiac function                 done

## 2022-08-04 ENCOUNTER — HOSPITAL ENCOUNTER (OUTPATIENT)
Dept: LAB | Facility: MEDICAL CENTER | Age: 72
End: 2022-08-04
Attending: INTERNAL MEDICINE
Payer: MEDICARE

## 2022-08-04 DIAGNOSIS — R60.9 EDEMA, UNSPECIFIED TYPE: ICD-10-CM

## 2022-08-04 DIAGNOSIS — N18.4 STAGE 4 CHRONIC KIDNEY DISEASE (HCC): ICD-10-CM

## 2022-08-04 DIAGNOSIS — I10 ESSENTIAL HYPERTENSION: ICD-10-CM

## 2022-08-04 LAB
ANION GAP SERPL CALC-SCNC: 15 MMOL/L (ref 7–16)
BUN SERPL-MCNC: 45 MG/DL (ref 8–22)
CALCIUM SERPL-MCNC: 9.7 MG/DL (ref 8.5–10.5)
CHLORIDE SERPL-SCNC: 98 MMOL/L (ref 96–112)
CO2 SERPL-SCNC: 26 MMOL/L (ref 20–33)
CREAT SERPL-MCNC: 3.14 MG/DL (ref 0.5–1.4)
ERYTHROCYTE [DISTWIDTH] IN BLOOD BY AUTOMATED COUNT: 51.8 FL (ref 35.9–50)
FASTING STATUS PATIENT QL REPORTED: NORMAL
GFR SERPLBLD CREATININE-BSD FMLA CKD-EPI: 20 ML/MIN/1.73 M 2
GLUCOSE SERPL-MCNC: 116 MG/DL (ref 65–99)
HCT VFR BLD AUTO: 41 % (ref 42–52)
HGB BLD-MCNC: 12.5 G/DL (ref 14–18)
MCH RBC QN AUTO: 23.9 PG (ref 27–33)
MCHC RBC AUTO-ENTMCNC: 30.5 G/DL (ref 33.7–35.3)
MCV RBC AUTO: 78.4 FL (ref 81.4–97.8)
PLATELET # BLD AUTO: 319 K/UL (ref 164–446)
PMV BLD AUTO: 10 FL (ref 9–12.9)
POTASSIUM SERPL-SCNC: 3.1 MMOL/L (ref 3.6–5.5)
RBC # BLD AUTO: 5.23 M/UL (ref 4.7–6.1)
SODIUM SERPL-SCNC: 139 MMOL/L (ref 135–145)
WBC # BLD AUTO: 8.3 K/UL (ref 4.8–10.8)

## 2022-08-04 PROCEDURE — 80048 BASIC METABOLIC PNL TOTAL CA: CPT

## 2022-08-04 PROCEDURE — 36415 COLL VENOUS BLD VENIPUNCTURE: CPT

## 2022-08-04 PROCEDURE — 85027 COMPLETE CBC AUTOMATED: CPT

## 2022-08-08 ENCOUNTER — TELEPHONE (OUTPATIENT)
Dept: CARDIOLOGY | Facility: MEDICAL CENTER | Age: 72
End: 2022-08-08
Payer: MEDICARE

## 2022-08-08 NOTE — TELEPHONE ENCOUNTER
Reviewed surveillance spreadsheet, patient cancelled 3mo FU and never had stress test completed.    LVM for patient on both phone numbers to call back and schedule. Sent letter.

## 2022-08-15 NOTE — DISCHARGE INSTRUCTIONS
ACTIVITY: Rest and take it easy for the first 24 hours.  A responsible adult is recommended to remain with you during that time.  It is normal to feel sleepy.  We encourage you to not do anything that requires balance, judgment or coordination.    MILD FLU-LIKE SYMPTOMS ARE NORMAL. YOU MAY EXPERIENCE GENERALIZED MUSCLE ACHES, THROAT IRRITATION, HEADACHE AND/OR SOME NAUSEA.    FOR 24 HOURS DO NOT:  Drive, operate machinery or run household appliances.  Drink beer or alcoholic beverages.   Make important decisions or sign legal documents.    SPECIAL INSTRUCTIONS:   Transesophageal Echocardiogram  Transesophageal echocardiogram ([place]) is a test that uses sound waves to take pictures of your heart. [place] is done by passing a flexible tube down the esophagus. The esophagus is the tube that carries food from the throat to the stomach. The pictures give detailed images of your heart. This can help your doctor see if there are problems with your heart.  What happens before the procedure?  Staying hydrated  Follow instructions from your doctor about hydration, which may include:  · Up to 3 hours before the procedure - you may continue to drink clear liquids, such as:  ? Water.  ? Clear fruit juice.  ? Black coffee.  ? Plain tea.    Eating and drinking  Follow instructions from your doctor about eating and drinking, which may include:  · 8 hours before the procedure - stop eating heavy meals or foods such as meat, fried foods, or fatty foods.  · 6 hours before the procedure - stop eating light meals or foods, such as toast or cereal.  · 6 hours before the procedure - stop drinking milk or drinks that contain milk.  · 3 hours before the procedure - stop drinking clear liquids.  General instructions  · You will need to take out any dentures or retainers.  · Plan to have someone take you home from the hospital or clinic.  · If you will be going home right after the procedure, plan to have someone with you for 24 hours.  · Ask  X-ray of right knee was negative for acute fracture or dislocation.  Please follow-up with Ortho within 1 week for further evaluation.  Use knee immobilizer as needed until seen by Ortho.  May also use ice for swelling.  Please return to ED for any new or worsening symptoms including but not limited to worsening pain, worsening numbness, tingling, worsening weakness, cold limb, worsening swelling, worsening redness, fever, body aches, chills, lightheadedness, dizziness, or any other symptoms.   your doctor about:  ? Changing or stopping your normal medicines. This is important if you take diabetes medicines or blood thinners.  ? Taking over-the-counter medicines, vitamins, herbs, and supplements.  ? Taking medicines such as aspirin and ibuprofen. These medicines can thin your blood. Do not take these medicines unless your doctor tells you to take them.  What happens during the procedure?  · To lower your risk of infection, your doctors will wash or clean their hands.  · An IV will be put into one of your veins.  · You will be given a medicine to help you relax (sedative).  · A medicine may be sprayed or gargled. This numbs the back of your throat.  · Your blood pressure, heart rate, and breathing will be watched.  · You may be asked to lay on your left side.  · A bite block will be placed in your mouth. This keeps you from biting the tube.  · The tip of the [place] probe will be placed into the back of your mouth.  · You will be asked to swallow.  · Your doctor will take pictures of your heart.  · The probe and bite block will be taken out.  The procedure may vary among doctors and hospitals.  What happens after the procedure?    · Your blood pressure, heart rate, breathing rate, and blood oxygen level will be watched until the medicines you were given have worn off.  · When you first wake up, your throat may feel sore and numb. This will get better over time. You will not be allowed to eat or drink until the numbness has gone away.  · Do not drive for 24 hours if you were given a medicine to help you relax.  Summary  · [place] is a test that uses sound waves to take pictures of your heart.  · You will be given a medicine to help you relax.  · Do not drive for 24 hours if you were given a medicine to help you relax.    [place] - TRANSESOPHAGEAL ECHOCARDIOGRAM  1. Don't drive or drink alcohol for 24 hours. The medication you received will make you too drowsy.  2. If you begin to vomit bloody material, or develop black  or bloody stools, call your doctor as soon as possible.  3. If you have any neck, chest, abdominal pain or temp of 100 degrees, call your doctor.      You should call 911 if you develop problems with breathing or chest pain.  If any questions arise, call your doctor. If your doctor is not available, please feel free to call {Endoscopy Dept Numbers:[id number]}. You can also call the [facility] HOTLINE open 24 hours/day, 7 days/week and speak to a nurse at [phone], or toll free [phone].      DIET: To avoid nausea, slowly advance diet as tolerated, avoiding spicy or greasy foods for the first day.  Add more substantial food to your diet according to your physician's instructions.  Babies can be fed formula or breast milk as soon as they are hungry.  INCREASE FLUIDS AND FIBER TO AVOID CONSTIPATION.      FOLLOW-UP APPOINTMENT:  A follow-up appointment should be arranged with your doctor in 1-2 weeks; call to schedule.    You should CALL YOUR PHYSICIAN if you develop:  Fever greater than 101 degrees F.  Pain not relieved by medication, or persistent nausea or vomiting.  Excessive bleeding (blood soaking through dressing) or unexpected drainage from the wound.  Extreme redness or swelling around the incision site, drainage of pus or foul smelling drainage.  Inability to urinate or empty your bladder within 8 hours.  Problems with breathing or chest pain.    You should call 911 if you develop problems with breathing or chest pain.  If you are unable to contact your doctor or surgical center, you should go to the nearest emergency room or urgent care center.  Physician's telephone #: [email or web address] [phone]    If any questions arise, call your doctor.  If your doctor is not available, please feel free to call the Surgical Center at {Surgical Dept Numbers:[id number]}. The Contact Center is open Monday through Friday 7AM to 5PM and may speak to a nurse at [phone], or toll free at [phone].     A registered nurse may  call you a few days after your surgery to see how you are doing after your procedure.    MEDICATIONS: Resume taking daily medication.  Take prescribed pain medication with food.  If no medication is prescribed, you may take non-aspirin pain medication if needed.  PAIN MEDICATION CAN BE VERY CONSTIPATING.  Take a stool softener or laxative such as senokot, pericolace, or milk of magnesia if needed.    If your physician has prescribed pain medication that includes Acetaminophen (Tylenol), do not take additional Acetaminophen (Tylenol) while taking the prescribed medication.    Depression / Suicide Risk    As you are discharged from this [facility] facility, it is important to learn how to keep safe from harming yourself.    Recognize the warning signs:  · Abrupt changes in personality, positive or negative- including increase in energy   · Giving away possessions  · Change in eating patterns- significant weight changes-  positive or negative  · Change in sleeping patterns- unable to sleep or sleeping all the time   · Unwillingness or inability to communicate  · Depression  · Unusual sadness, discouragement and loneliness  · Talk of wanting to die  · Neglect of personal appearance   · Rebelliousness- reckless behavior  · Withdrawal from people/activities they love  · Confusion- inability to concentrate     If you or a loved one observes any of these behaviors or has concerns about self-harm, here's what you can do:  · Talk about it- your feelings and reasons for harming yourself  · Remove any means that you might use to hurt yourself (examples: pills, rope, extension cords, firearm)  · Get professional help from the community (Mental Health, Substance Abuse, psychological counseling)  · Do not be alone:Call your Safe Contact- someone whom you trust who will be there for you.  · Call your local CRISIS HOTLINE 042-3253 or 923-106-9829  · Call your local Children's Mobile Crisis Response Team [facility] (669)  506-4010 or www.[facility]  · Call the toll free National Suicide Prevention Hotlines   · National Suicide Prevention Lifeline 718-465-BKRF (3370)  · National Mobile Iron Line Network 800-SUICIDE (096-7714)

## 2022-11-02 ENCOUNTER — PATIENT MESSAGE (OUTPATIENT)
Dept: HEALTH INFORMATION MANAGEMENT | Facility: OTHER | Age: 72
End: 2022-11-02

## 2022-12-14 ENCOUNTER — NON-PROVIDER VISIT (OUTPATIENT)
Dept: CARDIOLOGY | Facility: MEDICAL CENTER | Age: 72
End: 2022-12-14
Payer: MEDICARE

## 2022-12-14 DIAGNOSIS — I48.20 ATRIAL FIBRILLATION, CHRONIC (HCC): ICD-10-CM

## 2022-12-14 DIAGNOSIS — Z95.0 CARDIAC PACEMAKER IN SITU: ICD-10-CM

## 2022-12-14 DIAGNOSIS — I44.2 AV BLOCK, 3RD DEGREE (HCC): ICD-10-CM

## 2022-12-14 PROCEDURE — 93279 PRGRMG DEV EVAL PM/LDLS PM: CPT | Performed by: INTERNAL MEDICINE

## 2022-12-19 ENCOUNTER — APPOINTMENT (RX ONLY)
Dept: URBAN - METROPOLITAN AREA CLINIC 6 | Facility: CLINIC | Age: 72
Setting detail: DERMATOLOGY
End: 2022-12-19

## 2022-12-19 DIAGNOSIS — L82.1 OTHER SEBORRHEIC KERATOSIS: ICD-10-CM

## 2022-12-19 DIAGNOSIS — D22 MELANOCYTIC NEVI: ICD-10-CM

## 2022-12-19 DIAGNOSIS — D485 NEOPLASM OF UNCERTAIN BEHAVIOR OF SKIN: ICD-10-CM

## 2022-12-19 DIAGNOSIS — L57.0 ACTINIC KERATOSIS: ICD-10-CM

## 2022-12-19 DIAGNOSIS — Z71.89 OTHER SPECIFIED COUNSELING: ICD-10-CM

## 2022-12-19 DIAGNOSIS — L81.4 OTHER MELANIN HYPERPIGMENTATION: ICD-10-CM

## 2022-12-19 DIAGNOSIS — D18.0 HEMANGIOMA: ICD-10-CM

## 2022-12-19 PROBLEM — D22.5 MELANOCYTIC NEVI OF TRUNK: Status: ACTIVE | Noted: 2022-12-19

## 2022-12-19 PROBLEM — D48.5 NEOPLASM OF UNCERTAIN BEHAVIOR OF SKIN: Status: ACTIVE | Noted: 2022-12-19

## 2022-12-19 PROBLEM — D18.01 HEMANGIOMA OF SKIN AND SUBCUTANEOUS TISSUE: Status: ACTIVE | Noted: 2022-12-19

## 2022-12-19 PROCEDURE — ? MEDICATION COUNSELING

## 2022-12-19 PROCEDURE — 17003 DESTRUCT PREMALG LES 2-14: CPT

## 2022-12-19 PROCEDURE — ? PRESCRIPTION

## 2022-12-19 PROCEDURE — ? LIQUID NITROGEN

## 2022-12-19 PROCEDURE — 11102 TANGNTL BX SKIN SINGLE LES: CPT

## 2022-12-19 PROCEDURE — ? SEPARATE AND IDENTIFIABLE DOCUMENTATION

## 2022-12-19 PROCEDURE — 11103 TANGNTL BX SKIN EA SEP/ADDL: CPT

## 2022-12-19 PROCEDURE — ? COUNSELING

## 2022-12-19 PROCEDURE — ? BIOPSY BY SHAVE METHOD

## 2022-12-19 PROCEDURE — 17000 DESTRUCT PREMALG LESION: CPT | Mod: 59

## 2022-12-19 PROCEDURE — 99204 OFFICE O/P NEW MOD 45 MIN: CPT | Mod: 25

## 2022-12-19 RX ADMIN — FLUOROURACIL 1: 5 CREAM TOPICAL at 00:00

## 2022-12-19 ASSESSMENT — LOCATION DETAILED DESCRIPTION DERM
LOCATION DETAILED: RIGHT RADIAL DORSAL HAND
LOCATION DETAILED: RIGHT LATERAL TRAPEZIAL NECK
LOCATION DETAILED: LEFT PROXIMAL RADIAL DORSAL FOREARM
LOCATION DETAILED: RIGHT PROXIMAL DORSAL FOREARM
LOCATION DETAILED: LEFT SUPERIOR LATERAL BUCCAL CHEEK
LOCATION DETAILED: LEFT DISTAL DORSAL FOREARM
LOCATION DETAILED: STERNUM
LOCATION DETAILED: RIGHT DISTAL DORSAL FOREARM
LOCATION DETAILED: EPIGASTRIC SKIN
LOCATION DETAILED: LEFT ULNAR DORSAL HAND
LOCATION DETAILED: LEFT INFERIOR CENTRAL MALAR CHEEK
LOCATION DETAILED: LEFT MEDIAL INFERIOR CHEST
LOCATION DETAILED: RIGHT DORSAL WRIST
LOCATION DETAILED: PERIUMBILICAL SKIN

## 2022-12-19 ASSESSMENT — LOCATION SIMPLE DESCRIPTION DERM
LOCATION SIMPLE: RIGHT HAND
LOCATION SIMPLE: CHEST
LOCATION SIMPLE: LEFT CHEEK
LOCATION SIMPLE: LEFT HAND
LOCATION SIMPLE: POSTERIOR NECK
LOCATION SIMPLE: ABDOMEN
LOCATION SIMPLE: RIGHT WRIST
LOCATION SIMPLE: LEFT FOREARM
LOCATION SIMPLE: RIGHT FOREARM

## 2022-12-19 ASSESSMENT — LOCATION ZONE DERM
LOCATION ZONE: ARM
LOCATION ZONE: NECK
LOCATION ZONE: HAND
LOCATION ZONE: FACE
LOCATION ZONE: TRUNK

## 2022-12-19 NOTE — PROCEDURE: MEDICATION COUNSELING
Simponi Pregnancy And Lactation Text: The risk during pregnancy and breastfeeding is uncertain with this medication.
Azithromycin Counseling:  I discussed with the patient the risks of azithromycin including but not limited to GI upset, allergic reaction, drug rash, diarrhea, and yeast infections.
Doxepin Counseling:  Patient advised that the medication is sedating and not to drive a car after taking this medication. Patient informed of potential adverse effects including but not limited to dry mouth, urinary retention, and blurry vision.  The patient verbalized understanding of the proper use and possible adverse effects of doxepin.  All of the patient's questions and concerns were addressed.
Niacinamide Counseling: I recommended taking niacin or niacinamide, also know as vitamin B3, twice daily. Recent evidence suggests that taking vitamin B3 (500 mg twice daily) can reduce the risk of actinic keratoses and non-melanoma skin cancers. Side effects of vitamin B3 include flushing and headache.
Picato Counseling:  I discussed with the patient the risks of Picato including but not limited to erythema, scaling, itching, weeping, crusting, and pain.
Rifampin Pregnancy And Lactation Text: This medication is Pregnancy Category C and it isn't know if it is safe during pregnancy. It is also excreted in breast milk and should not be used if you are breast feeding.
Dapsone Pregnancy And Lactation Text: This medication is Pregnancy Category C and is not considered safe during pregnancy or breast feeding.
Prednisone Pregnancy And Lactation Text: This medication is Pregnancy Category C and it isn't know if it is safe during pregnancy. This medication is excreted in breast milk.
Dutasteride Pregnancy And Lactation Text: This medication is absolutely contraindicated in women, especially during pregnancy and breast feeding. Feminization of male fetuses is possible if taking while pregnant.
Topical Sulfur Applications Pregnancy And Lactation Text: This medication is Pregnancy Category C and has an unknown safety profile during pregnancy. It is unknown if this topical medication is excreted in breast milk.
Isotretinoin Counseling: Patient should get monthly blood tests, not donate blood, not drive at night if vision affected, not share medication, and not undergo elective surgery for 6 months after tx completed. Side effects reviewed, pt to contact office should one occur.
Ilumya Counseling: I discussed with the patient the risks of tildrakizumab including but not limited to immunosuppression, malignancy, posterior leukoencephalopathy syndrome, and serious infections.  The patient understands that monitoring is required including a PPD at baseline and must alert us or the primary physician if symptoms of infection or other concerning signs are noted.
Imiquimod Pregnancy And Lactation Text: This medication is Pregnancy Category C. It is unknown if this medication is excreted in breast milk.
Opioid Counseling: I discussed with the patient the potential side effects of opioids including but not limited to addiction, altered mental status, and depression. I stressed avoiding alcohol, benzodiazepines, muscle relaxants and sleep aids unless specifically okayed by a physician. The patient verbalized understanding of the proper use and possible adverse effects of opioids. All of the patient's questions and concerns were addressed. They were instructed to flush the remaining pills down the toilet if they did not need them for pain.
Itraconazole Counseling:  I discussed with the patient the risks of itraconazole including but not limited to liver damage, nausea/vomiting, neuropathy, and severe allergy.  The patient understands that this medication is best absorbed when taken with acidic beverages such as non-diet cola or ginger ale.  The patient understands that monitoring is required including baseline LFTs and repeat LFTs at intervals.  The patient understands that they are to contact us or the primary physician if concerning signs are noted.
Xeljanz Counseling: I discussed with the patient the risks of Xeljanz therapy including increased risk of infection, liver issues, headache, diarrhea, or cold symptoms. Live vaccines should be avoided. They were instructed to call if they have any problems.
Oxybutynin Counseling:  I discussed with the patient the risks of oxybutynin including but not limited to skin rash, drowsiness, dry mouth, difficulty urinating, and blurred vision.
Cellcept Counseling:  I discussed with the patient the risks of mycophenolate mofetil including but not limited to infection/immunosuppression, GI upset, hypokalemia, hypercholesterolemia, bone marrow suppression, lymphoproliferative disorders, malignancy, GI ulceration/bleed/perforation, colitis, interstitial lung disease, kidney failure, progressive multifocal leukoencephalopathy, and birth defects.  The patient understands that monitoring is required including a baseline creatinine and regular CBC testing. In addition, patient must alert us immediately if symptoms of infection or other concerning signs are noted.
Topical Retinoid counseling:  Patient advised to apply a pea-sized amount only at bedtime and wait 30 minutes after washing their face before applying.  If too drying, patient may add a non-comedogenic moisturizer. The patient verbalized understanding of the proper use and possible adverse effects of retinoids.  All of the patient's questions and concerns were addressed.
Tranexamic Acid Pregnancy And Lactation Text: It is unknown if this medication is safe during pregnancy or breast feeding.
Drysol Counseling:  I discussed with the patient the risks of drysol/aluminum chloride including but not limited to skin rash, itching, irritation, burning.
Doxepin Pregnancy And Lactation Text: This medication is Pregnancy Category C and it isn't known if it is safe during pregnancy. It is also excreted in breast milk and breast feeding isn't recommended.
Azithromycin Pregnancy And Lactation Text: This medication is considered safe during pregnancy and is also secreted in breast milk.
Skyrizi Counseling: I discussed with the patient the risks of risankizumab-rzaa including but not limited to immunosuppression, and serious infections.  The patient understands that monitoring is required including a PPD at baseline and must alert us or the primary physician if symptoms of infection or other concerning signs are noted.
Erythromycin Counseling:  I discussed with the patient the risks of erythromycin including but not limited to GI upset, allergic reaction, drug rash, diarrhea, increase in liver enzymes, and yeast infections.
Benzoyl Peroxide Pregnancy And Lactation Text: This medication is Pregnancy Category C. It is unknown if benzoyl peroxide is excreted in breast milk.
Xolair Pregnancy And Lactation Text: This medication is Pregnancy Category B and is considered safe during pregnancy. This medication is excreted in breast milk.
Adbry Pregnancy And Lactation Text: It is unknown if this medication will adversely affect pregnancy or breast feeding.
Wartpeel Counseling:  I discussed with the patient the risks of Wartpeel including but not limited to erythema, scaling, itching, weeping, crusting, and pain.
Opioid Pregnancy And Lactation Text: These medications can lead to premature delivery and should be avoided during pregnancy. These medications are also present in breast milk in small amounts.
Gabapentin Counseling: I discussed with the patient the risks of gabapentin including but not limited to dizziness, somnolence, fatigue and ataxia.
Klisyri Counseling:  I discussed with the patient the risks of Klisyri including but not limited to erythema, scaling, itching, weeping, crusting, and pain.
Isotretinoin Pregnancy And Lactation Text: This medication is Pregnancy Category X and is considered extremely dangerous during pregnancy. It is unknown if it is excreted in breast milk.
Niacinamide Pregnancy And Lactation Text: These medications are considered safe during pregnancy.
Sarecycline Counseling: Patient advised regarding possible photosensitivity and discoloration of the teeth, skin, lips, tongue and gums.  Patient instructed to avoid sunlight, if possible.  When exposed to sunlight, patients should wear protective clothing, sunglasses, and sunscreen.  The patient was instructed to call the office immediately if the following severe adverse effects occur:  hearing changes, easy bruising/bleeding, severe headache, or vision changes.  The patient verbalized understanding of the proper use and possible adverse effects of sarecycline.  All of the patient's questions and concerns were addressed.
Valtrex Counseling: I discussed with the patient the risks of valacyclovir including but not limited to kidney damage, nausea, vomiting and severe allergy.  The patient understands that if the infection seems to be worsening or is not improving, they are to call.
Xeladonisz Pregnancy And Lactation Text: This medication is Pregnancy Category D and is not considered safe during pregnancy.  The risk during breast feeding is also uncertain.
Drysol Pregnancy And Lactation Text: This medication is considered safe during pregnancy and breast feeding.
Finasteride Male Counseling: Finasteride Counseling:  I discussed with the patient the risks of use of finasteride including but not limited to decreased libido, decreased ejaculate volume, gynecomastia, and depression. Women should not handle medication.  All of the patient's questions and concerns were addressed.
Itraconazole Pregnancy And Lactation Text: This medication is Pregnancy Category C and it isn't know if it is safe during pregnancy. It is also excreted in breast milk.
Erythromycin Pregnancy And Lactation Text: This medication is Pregnancy Category B and is considered safe during pregnancy. It is also excreted in breast milk.
Cimzia Counseling:  I discussed with the patient the risks of Cimzia including but not limited to immunosuppression, allergic reactions and infections.  The patient understands that monitoring is required including a PPD at baseline and must alert us or the primary physician if symptoms of infection or other concerning signs are noted.
Oxybutynin Pregnancy And Lactation Text: This medication is Pregnancy Category B and is considered safe during pregnancy. It is unknown if it is excreted in breast milk.
Cellcept Pregnancy And Lactation Text: This medication is Pregnancy Category D and isn't considered safe during pregnancy. It is unknown if this medication is excreted in breast milk.
Cibinqo Counseling: I discussed with the patient the risks of Cibinqo therapy including but not limited to common cold, nausea, headache, cold sores, increased blood CPK levels, dizziness, UTIs, fatigue, acne, and vomitting. Live vaccines should be avoided.  This medication has been linked to serious infections; higher rate of mortality; malignancy and lymphoproliferative disorders; major adverse cardiovascular events; thrombosis; thrombocytopenia and lymphopenia; lipid elevations; and retinal detachment.
Carac Counseling:  I discussed with the patient the risks of Carac including but not limited to erythema, scaling, itching, weeping, crusting, and pain.
Nsaids Counseling: NSAID Counseling: I discussed with the patient that NSAIDs should be taken with food. Prolonged use of NSAIDs can result in the development of stomach ulcers.  Patient advised to stop taking NSAIDs if abdominal pain occurs.  The patient verbalized understanding of the proper use and possible adverse effects of NSAIDs.  All of the patient's questions and concerns were addressed.
Infliximab Counseling:  I discussed with the patient the risks of infliximab including but not limited to myelosuppression, immunosuppression, autoimmune hepatitis, demyelinating diseases, lymphoma, and serious infections.  The patient understands that monitoring is required including a PPD at baseline and must alert us or the primary physician if symptoms of infection or other concerning signs are noted.
Klisyri Pregnancy And Lactation Text: It is unknown if this medication can harm a developing fetus or if it is excreted in breast milk.
High Dose Vitamin A Counseling: Side effects reviewed, pt to contact office should one occur.
Bactrim Counseling:  I discussed with the patient the risks of sulfa antibiotics including but not limited to GI upset, allergic reaction, drug rash, diarrhea, dizziness, photosensitivity, and yeast infections.  Rarely, more serious reactions can occur including but not limited to aplastic anemia, agranulocytosis, methemoglobinemia, blood dyscrasias, liver or kidney failure, lung infiltrates or desquamative/blistering drug rashes.
Protopic Counseling: Patient may experience a mild burning sensation during topical application. Protopic is not approved in children less than 2 years of age. There have been case reports of hematologic and skin malignancies in patients using topical calcineurin inhibitors although causality is questionable.
Hydroxyzine Counseling: Patient advised that the medication is sedating and not to drive a car after taking this medication.  Patient informed of potential adverse effects including but not limited to dry mouth, urinary retention, and blurry vision.  The patient verbalized understanding of the proper use and possible adverse effects of hydroxyzine.  All of the patient's questions and concerns were addressed.
Finasteride Pregnancy And Lactation Text: This medication is absolutely contraindicated during pregnancy. It is unknown if it is excreted in breast milk.
Gabapentin Pregnancy And Lactation Text: This medication is Pregnancy Category C and isn't considered safe during pregnancy. It is excreted in breast milk.
Valtrex Pregnancy And Lactation Text: this medication is Pregnancy Category B and is considered safe during pregnancy. This medication is not directly found in breast milk but it's metabolite acyclovir is present.
Wartpeel Pregnancy And Lactation Text: This medication is Pregnancy Category X and contraindicated in pregnancy and in women who may become pregnant. It is unknown if this medication is excreted in breast milk.
Erivedge Counseling- I discussed with the patient the risks of Erivedge including but not limited to nausea, vomiting, diarrhea, constipation, weight loss, changes in the sense of taste, decreased appetite, muscle spasms, and hair loss.  The patient verbalized understanding of the proper use and possible adverse effects of Erivedge.  All of the patient's questions and concerns were addressed.
Cimzia Pregnancy And Lactation Text: This medication crosses the placenta but can be considered safe in certain situations. Cimzia may be excreted in breast milk.
Metronidazole Counseling:  I discussed with the patient the risks of metronidazole including but not limited to seizures, nausea/vomiting, a metallic taste in the mouth, nausea/vomiting and severe allergy.
Cyclophosphamide Counseling:  I discussed with the patient the risks of cyclophosphamide including but not limited to hair loss, hormonal abnormalities, decreased fertility, abdominal pain, diarrhea, nausea and vomiting, bone marrow suppression and infection. The patient understands that monitoring is required while taking this medication.
Arava Counseling:  Patient counseled regarding adverse effects of Arava including but not limited to nausea, vomiting, abnormalities in liver function tests. Patients may develop mouth sores, rash, diarrhea, and abnormalities in blood counts. The patient understands that monitoring is required including LFTs and blood counts.  There is a rare possibility of scarring of the liver and lung problems that can occur when taking methotrexate. Persistent nausea, loss of appetite, pale stools, dark urine, cough, and shortness of breath should be reported immediately. Patient advised to discontinue Arava treatment and consult with a physician prior to attempting conception. The patient will have to undergo a treatment to eliminate Arava from the body prior to conception.
Cibinqo Pregnancy And Lactation Text: It is unknown if this medication will adversely affect pregnancy or breast feeding.  You should not take this medication if you are currently pregnant or planning a pregnancy or while breastfeeding.
Tazorac Counseling:  Patient advised that medication is irritating and drying.  Patient may need to apply sparingly and wash off after an hour before eventually leaving it on overnight.  The patient verbalized understanding of the proper use and possible adverse effects of tazorac.  All of the patient's questions and concerns were addressed.
Elidel Counseling: Patient may experience a mild burning sensation during topical application. Elidel is not approved in children less than 2 years of age. There have been case reports of hematologic and skin malignancies in patients using topical calcineurin inhibitors although causality is questionable.
Sarecycline Pregnancy And Lactation Text: This medication is Pregnancy Category D and not consider safe during pregnancy. It is also excreted in breast milk.
Stelara Counseling:  I discussed with the patient the risks of ustekinumab including but not limited to immunosuppression, malignancy, posterior leukoencephalopathy syndrome, and serious infections.  The patient understands that monitoring is required including a PPD at baseline and must alert us or the primary physician if symptoms of infection or other concerning signs are noted.
Nsaids Pregnancy And Lactation Text: These medications are considered safe up to 30 weeks gestation. It is excreted in breast milk.
Acitretin Counseling:  I discussed with the patient the risks of acitretin including but not limited to hair loss, dry lips/skin/eyes, liver damage, hyperlipidemia, depression/suicidal ideation, photosensitivity.  Serious rare side effects can include but are not limited to pancreatitis, pseudotumor cerebri, bony changes, clot formation/stroke/heart attack.  Patient understands that alcohol is contraindicated since it can result in liver toxicity and significantly prolong the elimination of the drug by many years.
Protopic Pregnancy And Lactation Text: This medication is Pregnancy Category C. It is unknown if this medication is excreted in breast milk when applied topically.
Bactrim Pregnancy And Lactation Text: This medication is Pregnancy Category D and is known to cause fetal risk.  It is also excreted in breast milk.
Propranolol Counseling:  I discussed with the patient the risks of propranolol including but not limited to low heart rate, low blood pressure, low blood sugar, restlessness and increased cold sensitivity. They should call the office if they experience any of these side effects.
Ketoconazole Counseling:   Patient counseled regarding improving absorption with orange juice.  Adverse effects include but are not limited to breast enlargement, headache, diarrhea, nausea, upset stomach, liver function test abnormalities, taste disturbance, and stomach pain.  There is a rare possibility of liver failure that can occur when taking ketoconazole. The patient understands that monitoring of LFTs may be required, especially at baseline. The patient verbalized understanding of the proper use and possible adverse effects of ketoconazole.  All of the patient's questions and concerns were addressed.
Erivedge Pregnancy And Lactation Text: This medication is Pregnancy Category X and is absolutely contraindicated during pregnancy. It is unknown if it is excreted in breast milk.
Infliximab Pregnancy And Lactation Text: This medication is Pregnancy Category B and is considered safe during pregnancy. It is unknown if this medication is excreted in breast milk.
Minoxidil Counseling: Minoxidil is a topical medication which can increase blood flow where it is applied. It is uncertain how this medication increases hair growth. Side effects are uncommon and include stinging and allergic reactions.
Winlevi Counseling:  I discussed with the patient the risks of topical clascoterone including but not limited to erythema, scaling, itching, and stinging. Patient voiced their understanding.
Hydroxyzine Pregnancy And Lactation Text: This medication is not safe during pregnancy and should not be taken. It is also excreted in breast milk and breast feeding isn't recommended.
High Dose Vitamin A Pregnancy And Lactation Text: High dose vitamin A therapy is contraindicated during pregnancy and breast feeding.
Birth Control Pills Counseling: Birth Control Pill Counseling: I discussed with the patient the potential side effects of OCPs including but not limited to increased risk of stroke, heart attack, thrombophlebitis, deep venous thrombosis, hepatic adenomas, breast changes, GI upset, headaches, and depression.  The patient verbalized understanding of the proper use and possible adverse effects of OCPs. All of the patient's questions and concerns were addressed.
Tetracycline Counseling: Patient counseled regarding possible photosensitivity and increased risk for sunburn.  Patient instructed to avoid sunlight, if possible.  When exposed to sunlight, patients should wear protective clothing, sunglasses, and sunscreen.  The patient was instructed to call the office immediately if the following severe adverse effects occur:  hearing changes, easy bruising/bleeding, severe headache, or vision changes.  The patient verbalized understanding of the proper use and possible adverse effects of tetracycline.  All of the patient's questions and concerns were addressed. Patient understands to avoid pregnancy while on therapy due to potential birth defects.
Cyclophosphamide Pregnancy And Lactation Text: This medication is Pregnancy Category D and it isn't considered safe during pregnancy. This medication is excreted in breast milk.
Use Enhanced Medication Counseling?: No
Glycopyrrolate Counseling:  I discussed with the patient the risks of glycopyrrolate including but not limited to skin rash, drowsiness, dry mouth, difficulty urinating, and blurred vision.
Propranolol Pregnancy And Lactation Text: This medication is Pregnancy Category C and it isn't known if it is safe during pregnancy. It is excreted in breast milk.
Cosentyx Counseling:  I discussed with the patient the risks of Cosentyx including but not limited to worsening of Crohn's disease, immunosuppression, allergic reactions and infections.  The patient understands that monitoring is required including a PPD at baseline and must alert us or the primary physician if symptoms of infection or other concerning signs are noted.
Ketoconazole Pregnancy And Lactation Text: This medication is Pregnancy Category C and it isn't know if it is safe during pregnancy. It is also excreted in breast milk and breast feeding isn't recommended.
Olumiant Counseling: I discussed with the patient the risks of Olumiant therapy including but not limited to upper respiratory tract infections, shingles, cold sores, and nausea. Live vaccines should be avoided.  This medication has been linked to serious infections; higher rate of mortality; malignancy and lymphoproliferative disorders; major adverse cardiovascular events; thrombosis; gastrointestinal perforations; neutropenia; lymphopenia; anemia; liver enzyme elevations; and lipid elevations.
Qbrexza Counseling:  I discussed with the patient the risks of Qbrexza including but not limited to headache, mydriasis, blurred vision, dry eyes, nasal dryness, dry mouth, dry throat, dry skin, urinary hesitation, and constipation.  Local skin reactions including erythema, burning, stinging, and itching can also occur.
Cephalexin Counseling: I counseled the patient regarding use of cephalexin as an antibiotic for prophylactic and/or therapeutic purposes. Cephalexin (commonly prescribed under brand name Keflex) is a cephalosporin antibiotic which is active against numerous classes of bacteria, including most skin bacteria. Side effects may include nausea, diarrhea, gastrointestinal upset, rash, hives, yeast infections, and in rare cases, hepatitis, kidney disease, seizures, fever, confusion, neurologic symptoms, and others. Patients with severe allergies to penicillin medications are cautioned that there is about a 10% incidence of cross-reactivity with cephalosporins. When possible, patients with penicillin allergies should use alternatives to cephalosporins for antibiotic therapy.
Calcipotriene Counseling:  I discussed with the patient the risks of calcipotriene including but not limited to erythema, scaling, itching, and irritation.
Tazorac Pregnancy And Lactation Text: This medication is not safe during pregnancy. It is unknown if this medication is excreted in breast milk.
Metronidazole Pregnancy And Lactation Text: This medication is Pregnancy Category B and considered safe during pregnancy.  It is also excreted in breast milk.
Winlevi Pregnancy And Lactation Text: This medication is considered safe during pregnancy and breastfeeding.
Olanzapine Counseling- I discussed with the patient the common side effects of olanzapine including but are not limited to: lack of energy, dry mouth, increased appetite, sleepiness, tremor, constipation, dizziness, changes in behavior, or restlessness.  Explained that teenagers are more likely to experience headaches, abdominal pain, pain in the arms or legs, tiredness, and sleepiness.  Serious side effects include but are not limited: increased risk of death in elderly patients who are confused, have memory loss, or dementia-related psychosis; hyperglycemia; increased cholesterol and triglycerides; and weight gain.
Libtayo Counseling- I discussed with the patient the risks of Libtayo including but not limited to nausea, vomiting, diarrhea, and bone or muscle pain.  The patient verbalized understanding of the proper use and possible adverse effects of Libtayo.  All of the patient's questions and concerns were addressed.
Acitretin Pregnancy And Lactation Text: This medication is Pregnancy Category X and should not be given to women who are pregnant or may become pregnant in the future. This medication is excreted in breast milk.
Glycopyrrolate Pregnancy And Lactation Text: This medication is Pregnancy Category B and is considered safe during pregnancy. It is unknown if it is excreted breast milk.
Eucrisa Counseling: Patient may experience a mild burning sensation during topical application. Eucrisa is not approved in children less than 2 years of age.
Birth Control Pills Pregnancy And Lactation Text: This medication should be avoided if pregnant and for the first 30 days post-partum.
Rituxan Counseling:  I discussed with the patient the risks of Rituxan infusions. Side effects can include infusion reactions, severe drug rashes including mucocutaneous reactions, reactivation of latent hepatitis and other infections and rarely progressive multifocal leukoencephalopathy.  All of the patient's questions and concerns were addressed.
Minoxidil Pregnancy And Lactation Text: This medication has not been assigned a Pregnancy Risk Category but animal studies failed to show danger with the topical medication. It is unknown if the medication is excreted in breast milk.
Terbinafine Counseling: Patient counseling regarding adverse effects of terbinafine including but not limited to headache, diarrhea, rash, upset stomach, liver function test abnormalities, itching, taste/smell disturbance, nausea, abdominal pain, and flatulence.  There is a rare possibility of liver failure that can occur when taking terbinafine.  The patient understands that a baseline LFT and kidney function test may be required. The patient verbalized understanding of the proper use and possible adverse effects of terbinafine.  All of the patient's questions and concerns were addressed.
SSKI Counseling:  I discussed with the patient the risks of SSKI including but not limited to thyroid abnormalities, metallic taste, GI upset, fever, headache, acne, arthralgias, paraesthesias, lymphadenopathy, easy bleeding, arrhythmias, and allergic reaction.
Cyclosporine Counseling:  I discussed with the patient the risks of cyclosporine including but not limited to hypertension, gingival hyperplasia,myelosuppression, immunosuppression, liver damage, kidney damage, neurotoxicity, lymphoma, and serious infections. The patient understands that monitoring is required including baseline blood pressure, CBC, CMP, lipid panel and uric acid, and then 1-2 times monthly CMP and blood pressure.
Topical Clindamycin Counseling: Patient counseled that this medication may cause skin irritation or allergic reactions.  In the event of skin irritation, the patient was advised to reduce the amount of the drug applied or use it less frequently.   The patient verbalized understanding of the proper use and possible adverse effects of clindamycin.  All of the patient's questions and concerns were addressed.
Minocycline Counseling: Patient advised regarding possible photosensitivity and discoloration of the teeth, skin, lips, tongue and gums.  Patient instructed to avoid sunlight, if possible.  When exposed to sunlight, patients should wear protective clothing, sunglasses, and sunscreen.  The patient was instructed to call the office immediately if the following severe adverse effects occur:  hearing changes, easy bruising/bleeding, severe headache, or vision changes.  The patient verbalized understanding of the proper use and possible adverse effects of minocycline.  All of the patient's questions and concerns were addressed.
Clofazimine Counseling:  I discussed with the patient the risks of clofazimine including but not limited to skin and eye pigmentation, liver damage, nausea/vomiting, gastrointestinal bleeding and allergy.
Albendazole Counseling:  I discussed with the patient the risks of albendazole including but not limited to cytopenia, kidney damage, nausea/vomiting and severe allergy.  The patient understands that this medication is being used in an off-label manner.
Detail Level: Simple
Zoryve Counseling:  I discussed with the patient that Zoryve is not for use in the eyes, mouth or vagina. The most commonly reported side effects include diarrhea, headache, insomnia, application site pain, upper respiratory tract infections, and urinary tract infections.  All of the patient's questions and concerns were addressed.
Cephalexin Pregnancy And Lactation Text: This medication is Pregnancy Category B and considered safe during pregnancy.  It is also excreted in breast milk but can be used safely for shorter doses.
Qbrexza Pregnancy And Lactation Text: There is no available data on Qbrexza use in pregnant women.  There is no available data on Qbrexza use in lactation.
Taltz Counseling: I discussed with the patient the risks of ixekizumab including but not limited to immunosuppression, serious infections, worsening of inflammatory bowel disease and drug reactions.  The patient understands that monitoring is required including a PPD at baseline and must alert us or the primary physician if symptoms of infection or other concerning signs are noted.
Olumiant Pregnancy And Lactation Text: Based on animal studies, Olumiant may cause embryo-fetal harm when administered to pregnant women.  The medication should not be used in pregnancy.  Breastfeeding is not recommended during treatment.
Calcipotriene Pregnancy And Lactation Text: This medication has not been proven safe during pregnancy. It is unknown if this medication is excreted in breast milk.
Bexarotene Counseling:  I discussed with the patient the risks of bexarotene including but not limited to hair loss, dry lips/skin/eyes, liver abnormalities, hyperlipidemia, pancreatitis, depression/suicidal ideation, photosensitivity, drug rash/allergic reactions, hypothyroidism, anemia, leukopenia, infection, cataracts, and teratogenicity.  Patient understands that they will need regular blood tests to check lipid profile, liver function tests, white blood cell count, thyroid function tests and pregnancy test if applicable.
Rituxan Pregnancy And Lactation Text: This medication is Pregnancy Category C and it isn't know if it is safe during pregnancy. It is unknown if this medication is excreted in breast milk but similar antibodies are known to be excreted.
Hydroxychloroquine Counseling:  I discussed with the patient that a baseline ophthalmologic exam is needed at the start of therapy and every year thereafter while on therapy. A CBC may also be warranted for monitoring.  The side effects of this medication were discussed with the patient, including but not limited to agranulocytosis, aplastic anemia, seizures, rashes, retinopathy, and liver toxicity. Patient instructed to call the office should any adverse effect occur.  The patient verbalized understanding of the proper use and possible adverse effects of Plaquenil.  All the patient's questions and concerns were addressed.
Mirvaso Counseling: Mirvaso is a topical medication which can decrease superficial blood flow where applied. Side effects are uncommon and include stinging, redness and allergic reactions.
Libtayo Pregnancy And Lactation Text: This medication is contraindicated in pregnancy and when breast feeding.
Aklief counseling:  Patient advised to apply a pea-sized amount only at bedtime and wait 30 minutes after washing their face before applying.  If too drying, patient may add a non-comedogenic moisturizer.  The most commonly reported side effects including irritation, redness, scaling, dryness, stinging, burning, itching, and increased risk of sunburn.  The patient verbalized understanding of the proper use and possible adverse effects of retinoids.  All of the patient's questions and concerns were addressed.
VTAMA Counseling: I discussed with the patient that VTAMA is not for use in the eyes, mouth or mouth. They should call the office if they develop any signs of allergic reactions to VTAMA. The patient verbalized understanding of the proper use and possible adverse effects of VTAMA.  All of the patient's questions and concerns were addressed.
Olanzapine Pregnancy And Lactation Text: This medication is pregnancy category C.   There are no adequate and well controlled trials with olanzapine in pregnant females.  Olanzapine should be used during pregnancy only if the potential benefit justifies the potential risk to the fetus.   In a study in lactating healthy women, olanzapine was excreted in breast milk.  It is recommended that women taking olanzapine should not breast feed.
Albendazole Pregnancy And Lactation Text: This medication is Pregnancy Category C and it isn't known if it is safe during pregnancy. It is also excreted in breast milk.
Spironolactone Counseling: Patient advised regarding risks of diarrhea, abdominal pain, hyperkalemia, birth defects (for female patients), liver toxicity and renal toxicity. The patient may need blood work to monitor liver and kidney function and potassium levels while on therapy. The patient verbalized understanding of the proper use and possible adverse effects of spironolactone.  All of the patient's questions and concerns were addressed.
Rinvoq Counseling: I discussed with the patient the risks of Rinvoq therapy including but not limited to upper respiratory tract infections, shingles, cold sores, bronchitis, nausea, cough, fever, acne, and headache. Live vaccines should be avoided.  This medication has been linked to serious infections; higher rate of mortality; malignancy and lymphoproliferative disorders; major adverse cardiovascular events; thrombosis; thrombocytopenia, anemia, and neutropenia; lipid elevations; liver enzyme elevations; and gastrointestinal perforations.
Dupixent Counseling: I discussed with the patient the risks of dupilumab including but not limited to eye infection and irritation, cold sores, injection site reactions, worsening of asthma, allergic reactions and increased risk of parasitic infection.  Live vaccines should be avoided while taking dupilumab. Dupilumab will also interact with certain medications such as warfarin and cyclosporine. The patient understands that monitoring is required and they must alert us or the primary physician if symptoms of infection or other concerning signs are noted.
Zoryve Pregnancy And Lactation Text: It is unknown if this medication can cause problems during pregnancy and breastfeeding.
Sski Pregnancy And Lactation Text: This medication is Pregnancy Category D and isn't considered safe during pregnancy. It is excreted in breast milk.
Terbinafine Pregnancy And Lactation Text: This medication is Pregnancy Category B and is considered safe during pregnancy. It is also excreted in breast milk and breast feeding isn't recommended.
Oral Minoxidil Counseling- I discussed with the patient the risks of oral minoxidil including but not limited to shortness of breath, swelling of the feet or ankles, dizziness, lightheadedness, unwanted hair growth and allergic reaction.  The patient verbalized understanding of the proper use and possible adverse effects of oral minoxidil.  All of the patient's questions and concerns were addressed.
Odomzo Counseling- I discussed with the patient the risks of Odomzo including but not limited to nausea, vomiting, diarrhea, constipation, weight loss, changes in the sense of taste, decreased appetite, muscle spasms, and hair loss.  The patient verbalized understanding of the proper use and possible adverse effects of Odomzo.  All of the patient's questions and concerns were addressed.
Fluconazole Counseling:  Patient counseled regarding adverse effects of fluconazole including but not limited to headache, diarrhea, nausea, upset stomach, liver function test abnormalities, taste disturbance, and stomach pain.  There is a rare possibility of liver failure that can occur when taking fluconazole.  The patient understands that monitoring of LFTs and kidney function test may be required, especially at baseline. The patient verbalized understanding of the proper use and possible adverse effects of fluconazole.  All of the patient's questions and concerns were addressed.
Siliq Counseling:  I discussed with the patient the risks of Siliq including but not limited to new or worsening depression, suicidal thoughts and behavior, immunosuppression, malignancy, posterior leukoencephalopathy syndrome, and serious infections.  The patient understands that monitoring is required including a PPD at baseline and must alert us or the primary physician if symptoms of infection or other concerning signs are noted. There is also a special program designed to monitor depression which is required with Siliq.
Mirvaso Pregnancy And Lactation Text: This medication has not been assigned a Pregnancy Risk Category. It is unknown if the medication is excreted in breast milk.
Aklief Pregnancy And Lactation Text: It is unknown if this medication is safe to use during pregnancy.  It is unknown if this medication is excreted in breast milk.  Breastfeeding women should use the topical cream on the smallest area of the skin for the shortest time needed while breastfeeding.  Do not apply to nipple and areola.
Clindamycin Counseling: I counseled the patient regarding use of clindamycin as an antibiotic for prophylactic and/or therapeutic purposes. Clindamycin is active against numerous classes of bacteria, including skin bacteria. Side effects may include nausea, diarrhea, gastrointestinal upset, rash, hives, yeast infections, and in rare cases, colitis.
Rhofade Counseling: Rhofade is a topical medication which can decrease superficial blood flow where applied. Side effects are uncommon and include stinging, redness and allergic reactions.
Ivermectin Counseling:  Patient instructed to take medication on an empty stomach with a full glass of water.  Patient informed of potential adverse effects including but not limited to nausea, diarrhea, dizziness, itching, and swelling of the extremities or lymph nodes.  The patient verbalized understanding of the proper use and possible adverse effects of ivermectin.  All of the patient's questions and concerns were addressed.
Spironolactone Pregnancy And Lactation Text: This medication can cause feminization of the male fetus and should be avoided during pregnancy. The active metabolite is also found in breast milk.
Topical Ketoconazole Counseling: Patient counseled that this medication may cause skin irritation or allergic reactions.  In the event of skin irritation, the patient was advised to reduce the amount of the drug applied or use it less frequently.   The patient verbalized understanding of the proper use and possible adverse effects of ketoconazole.  All of the patient's questions and concerns were addressed.
Hydroxychloroquine Pregnancy And Lactation Text: This medication has been shown to cause fetal harm but it isn't assigned a Pregnancy Risk Category. There are small amounts excreted in breast milk.
Cantharidin Pregnancy And Lactation Text: The use of this medication during pregnancy or lactation is not recommended as there is insufficient data.
Quinolones Counseling:  I discussed with the patient the risks of fluoroquinolones including but not limited to GI upset, allergic reaction, drug rash, diarrhea, dizziness, photosensitivity, yeast infections, liver function test abnormalities, tendonitis/tendon rupture.
Thalidomide Counseling: I discussed with the patient the risks of thalidomide including but not limited to birth defects, anxiety, weakness, chest pain, dizziness, cough and severe allergy.
Colchicine Counseling:  Patient counseled regarding adverse effects including but not limited to stomach upset (nausea, vomiting, stomach pain, or diarrhea).  Patient instructed to limit alcohol consumption while taking this medication.  Colchicine may reduce blood counts especially with prolonged use.  The patient understands that monitoring of kidney function and blood counts may be required, especially at baseline. The patient verbalized understanding of the proper use and possible adverse effects of colchicine.  All of the patient's questions and concerns were addressed.
Methotrexate Counseling:  Patient counseled regarding adverse effects of methotrexate including but not limited to nausea, vomiting, abnormalities in liver function tests. Patients may develop mouth sores, rash, diarrhea, and abnormalities in blood counts. The patient understands that monitoring is required including LFT's and blood counts.  There is a rare possibility of scarring of the liver and lung problems that can occur when taking methotrexate. Persistent nausea, loss of appetite, pale stools, dark urine, cough, and shortness of breath should be reported immediately. Patient advised to discontinue methotrexate treatment at least three months before attempting to become pregnant.  I discussed the need for folate supplements while taking methotrexate.  These supplements can decrease side effects during methotrexate treatment. The patient verbalized understanding of the proper use and possible adverse effects of methotrexate.  All of the patient's questions and concerns were addressed.
Dupixent Pregnancy And Lactation Text: This medication likely crosses the placenta but the risk for the fetus is uncertain. This medication is excreted in breast milk.
Rinvoq Pregnancy And Lactation Text: Based on animal studies, Rinvoq may cause embryo-fetal harm when administered to pregnant women.  The medication should not be used in pregnancy.  Breastfeeding is not recommended during treatment and for 6 days after the last dose.
Hydroquinone Counseling:  Patient advised that medication may result in skin irritation, lightening (hypopigmentation), dryness, and burning.  In the event of skin irritation, the patient was advised to reduce the amount of the drug applied or use it less frequently.  Rarely, spots that are treated with hydroquinone can become darker (pseudoochronosis).  Should this occur, patient instructed to stop medication and call the office. The patient verbalized understanding of the proper use and possible adverse effects of hydroquinone.  All of the patient's questions and concerns were addressed.
Tremfya Counseling: I discussed with the patient the risks of guselkumab including but not limited to immunosuppression, serious infections, and drug reactions.  The patient understands that monitoring is required including a PPD at baseline and must alert us or the primary physician if symptoms of infection or other concerning signs are noted.
Opzelura Counseling:  I discussed with the patient the risks of Opzelura including but not limited to nasopharngitis, bronchitis, ear infection, eosinophila, hives, diarrhea, folliculitis, tonsillitis, and rhinorrhea.  Taken orally, this medication has been linked to serious infections; higher rate of mortality; malignancy and lymphoproliferative disorders; major adverse cardiovascular events; thrombosis; thrombocytopenia, anemia, and neutropenia; and lipid elevations.
Oral Minoxidil Pregnancy And Lactation Text: This medication should only be used when clearly needed if you are pregnant, attempting to become pregnant or breast feeding.
Clindamycin Pregnancy And Lactation Text: This medication can be used in pregnancy if certain situations. Clindamycin is also present in breast milk.
Zyclara Counseling:  I discussed with the patient the risks of imiquimod including but not limited to erythema, scaling, itching, weeping, crusting, and pain.  Patient understands that the inflammatory response to imiquimod is variable from person to person and was educated regarded proper titration schedule.  If flu-like symptoms develop, patient knows to discontinue the medication and contact us.
Cimetidine Counseling:  I discussed with the patient the risks of Cimetidine including but not limited to gynecomastia, headache, diarrhea, nausea, drowsiness, arrhythmias, pancreatitis, skin rashes, psychosis, bone marrow suppression and kidney toxicity.
Azelaic Acid Counseling: Patient counseled that medicine may cause skin irritation and to avoid applying near the eyes.  In the event of skin irritation, the patient was advised to reduce the amount of the drug applied or use it less frequently.   The patient verbalized understanding of the proper use and possible adverse effects of azelaic acid.  All of the patient's questions and concerns were addressed.
Humira Counseling:  I discussed with the patient the risks of adalimumab including but not limited to myelosuppression, immunosuppression, autoimmune hepatitis, demyelinating diseases, lymphoma, and serious infections.  The patient understands that monitoring is required including a PPD at baseline and must alert us or the primary physician if symptoms of infection or other concerning signs are noted.
Methotrexate Pregnancy And Lactation Text: This medication is Pregnancy Category X and is known to cause fetal harm. This medication is excreted in breast milk.
Low Dose Naltrexone Counseling- I discussed with the patient the potential risks and side effects of low dose naltrexone including but not limited to: more vivid dreams, headaches, nausea, vomiting, abdominal pain, fatigue, dizziness, and anxiety.
Azathioprine Counseling:  I discussed with the patient the risks of azathioprine including but not limited to myelosuppression, immunosuppression, hepatotoxicity, lymphoma, and infections.  The patient understands that monitoring is required including baseline LFTs, Creatinine, possible TPMP genotyping and weekly CBCs for the first month and then every 2 weeks thereafter.  The patient verbalized understanding of the proper use and possible adverse effects of azathioprine.  All of the patient's questions and concerns were addressed.
Doxycycline Counseling:  Patient counseled regarding possible photosensitivity and increased risk for sunburn.  Patient instructed to avoid sunlight, if possible.  When exposed to sunlight, patients should wear protective clothing, sunglasses, and sunscreen.  The patient was instructed to call the office immediately if the following severe adverse effects occur:  hearing changes, easy bruising/bleeding, severe headache, or vision changes.  The patient verbalized understanding of the proper use and possible adverse effects of doxycycline.  All of the patient's questions and concerns were addressed.
Solaraze Counseling:  I discussed with the patient the risks of Solaraze including but not limited to erythema, scaling, itching, weeping, crusting, and pain.
Sotyktu Counseling:  I discussed the most common side effects of Sotyktu including: common cold, sore throat, sinus infections, cold sores, canker sores, folliculitis, and acne.  I also discussed more serious side effects of Sotyktu including but not limited to: serious allergic reactions; increased risk for infections such as TB; cancers such as lymphomas; rhabdomyolysis and elevated CPK; and elevated triglycerides and liver enzymes. 
5-Fu Counseling: 5-Fluorouracil Counseling:  I discussed with the patient the risks of 5-fluorouracil including but not limited to erythema, scaling, itching, weeping, crusting, and pain.
Enbrel Counseling:  I discussed with the patient the risks of etanercept including but not limited to myelosuppression, immunosuppression, autoimmune hepatitis, demyelinating diseases, lymphoma, and infections.  The patient understands that monitoring is required including a PPD at baseline and must alert us or the primary physician if symptoms of infection or other concerning signs are noted.
Otezla Counseling: The side effects of Otezla were discussed with the patient, including but not limited to worsening or new depression, weight loss, diarrhea, nausea, upper respiratory tract infection, and headache. Patient instructed to call the office should any adverse effect occur.  The patient verbalized understanding of the proper use and possible adverse effects of Otezla.  All the patient's questions and concerns were addressed.
Griseofulvin Counseling:  I discussed with the patient the risks of griseofulvin including but not limited to photosensitivity, cytopenia, liver damage, nausea/vomiting and severe allergy.  The patient understands that this medication is best absorbed when taken with a fatty meal (e.g., ice cream or french fries).
Low Dose Naltrexone Pregnancy And Lactation Text: Naltrexone is pregnancy category C.  There have been no adequate and well-controlled studies in pregnant women.  It should be used in pregnancy only if the potential benefit justifies the potential risk to the fetus.   Limited data indicates that naltrexone is minimally excreted into breastmilk.
Imiquimod Counseling:  I discussed with the patient the risks of imiquimod including but not limited to erythema, scaling, itching, weeping, crusting, and pain.  Patient understands that the inflammatory response to imiquimod is variable from person to person and was educated regarded proper titration schedule.  If flu-like symptoms develop, patient knows to discontinue the medication and contact us.
Bexarotene Pregnancy And Lactation Text: This medication is Pregnancy Category X and should not be given to women who are pregnant or may become pregnant. This medication should not be used if you are breast feeding.
Simponi Counseling:  I discussed with the patient the risks of golimumab including but not limited to myelosuppression, immunosuppression, autoimmune hepatitis, demyelinating diseases, lymphoma, and serious infections.  The patient understands that monitoring is required including a PPD at baseline and must alert us or the primary physician if symptoms of infection or other concerning signs are noted.
Opzelura Pregnancy And Lactation Text: There is insufficient data to evaluate drug-associated risk for major birth defects, miscarriage, or other adverse maternal or fetal outcomes.  There is a pregnancy registry that monitors pregnancy outcomes in pregnant persons exposed to the medication during pregnancy.  It is unknown if this medication is excreted in breast milk.  Do not breastfeed during treatment and for about 4 weeks after the last dose.
Dutasteride Male Counseling: Dustasteride Counseling:  I discussed with the patient the risks of use of dutasteride including but not limited to decreased libido, decreased ejaculate volume, and gynecomastia. Women who can become pregnant should not handle medication.  All of the patient's questions and concerns were addressed.
Solaraze Pregnancy And Lactation Text: This medication is Pregnancy Category B and is considered safe. There is some data to suggest avoiding during the third trimester. It is unknown if this medication is excreted in breast milk.
Tranexamic Acid Counseling:  Patient advised of the small risk of bleeding problems with tranexamic acid. They were also instructed to call if they developed any nausea, vomiting or diarrhea. All of the patient's questions and concerns were addressed.
Topical Sulfur Applications Counseling: Topical Sulfur Counseling: Patient counseled that this medication may cause skin irritation or allergic reactions.  In the event of skin irritation, the patient was advised to reduce the amount of the drug applied or use it less frequently.   The patient verbalized understanding of the proper use and possible adverse effects of topical sulfur application.  All of the patient's questions and concerns were addressed.
Prednisone Counseling:  I discussed with the patient the risks of prolonged use of prednisone including but not limited to weight gain, insomnia, osteoporosis, mood changes, diabetes, susceptibility to infection, glaucoma and high blood pressure.  In cases where prednisone use is prolonged, patients should be monitored with blood pressure checks, serum glucose levels and an eye exam.  Additionally, the patient may need to be placed on GI prophylaxis, PCP prophylaxis, and calcium and vitamin D supplementation and/or a bisphosphonate.  The patient verbalized understanding of the proper use and the possible adverse effects of prednisone.  All of the patient's questions and concerns were addressed.
Rifampin Counseling: I discussed with the patient the risks of rifampin including but not limited to liver damage, kidney damage, red-orange body fluids, nausea/vomiting and severe allergy.
Dapsone Counseling: I discussed with the patient the risks of dapsone including but not limited to hemolytic anemia, agranulocytosis, rashes, methemoglobinemia, kidney failure, peripheral neuropathy, headaches, GI upset, and liver toxicity.  Patients who start dapsone require monitoring including baseline LFTs and weekly CBCs for the first month, then every month thereafter.  The patient verbalized understanding of the proper use and possible adverse effects of dapsone.  All of the patient's questions and concerns were addressed.
Sotyktu Pregnancy And Lactation Text: There is insufficient data to evaluate whether or not Sotyktu is safe to use during pregnancy.   It is not known if Sotyktu passes into breast milk and whether or not it is safe to use when breastfeeding.  
Benzoyl Peroxide Counseling: Patient counseled that medicine may cause skin irritation and bleach clothing.  In the event of skin irritation, the patient was advised to reduce the amount of the drug applied or use it less frequently.   The patient verbalized understanding of the proper use and possible adverse effects of benzoyl peroxide.  All of the patient's questions and concerns were addressed.
Otezla Pregnancy And Lactation Text: This medication is Pregnancy Category C and it isn't known if it is safe during pregnancy. It is unknown if it is excreted in breast milk.
Adbry Counseling: I discussed with the patient the risks of tralokinumab including but not limited to eye infection and irritation, cold sores, injection site reactions, worsening of asthma, allergic reactions and increased risk of parasitic infection.  Live vaccines should be avoided while taking tralokinumab. The patient understands that monitoring is required and they must alert us or the primary physician if symptoms of infection or other concerning signs are noted.
Griseofulvin Pregnancy And Lactation Text: This medication is Pregnancy Category X and is known to cause serious birth defects. It is unknown if this medication is excreted in breast milk but breast feeding should be avoided.
Doxycycline Pregnancy And Lactation Text: This medication is Pregnancy Category D and not consider safe during pregnancy. It is also excreted in breast milk but is considered safe for shorter treatment courses.
Xolair Counseling:  Patient informed of potential adverse effects including but not limited to fever, muscle aches, rash and allergic reactions.  The patient verbalized understanding of the proper use and possible adverse effects of Xolair.  All of the patient's questions and concerns were addressed.

## 2022-12-19 NOTE — PROCEDURE: BIOPSY BY SHAVE METHOD
Detail Level: Detailed
Depth Of Biopsy: dermis
Was A Bandage Applied: Yes
Size Of Lesion In Cm: 0.6
X Size Of Lesion In Cm: 0
Biopsy Type: H and E
Biopsy Method: Dermablade
Anesthesia Type: 1% lidocaine with epinephrine
Anesthesia Volume In Cc: 0.5
Hemostasis: Drysol
Wound Care: Petrolatum
Dressing: bandage
Destruction After The Procedure: No
Type Of Destruction Used: Curettage
Curettage Text: The wound bed was treated with curettage after the biopsy was performed.
Cryotherapy Text: The wound bed was treated with cryotherapy after the biopsy was performed.
Electrodesiccation Text: The wound bed was treated with electrodesiccation after the biopsy was performed.
Electrodesiccation And Curettage Text: The wound bed was treated with electrodesiccation and curettage after the biopsy was performed.
Silver Nitrate Text: The wound bed was treated with silver nitrate after the biopsy was performed.
Lab: [id number]
Lab Facility: 
Consent: Written consent was obtained and risks were reviewed including but not limited to scarring, infection, bleeding, scabbing, incomplete removal, nerve damage and allergy to anesthesia.
Post-Care Instructions: I reviewed with the patient in detail post-care instructions. Patient is to keep the biopsy site dry overnight, and then apply bacitracin twice daily until healed. Patient may apply hydrogen peroxide soaks to remove any crusting.
Notification Instructions: Patient will be notified of biopsy results. However, patient instructed to call the office if not contacted within 2 weeks.
Billing Type: Third-Party Bill
Information: Selecting Yes will display possible errors in your note based on the variables you have selected. This validation is only offered as a suggestion for you. PLEASE NOTE THAT THE VALIDATION TEXT WILL BE REMOVED WHEN YOU FINALIZE YOUR NOTE. IF YOU WANT TO FAX A PRELIMINARY NOTE YOU WILL NEED TO TOGGLE THIS TO 'NO' IF YOU DO NOT WANT IT IN YOUR FAXED NOTE.
Size Of Lesion In Cm: 2
Path Notes (To The Dermatopathologist): Portion of a larger lesion

## 2022-12-19 NOTE — PROCEDURE: LIQUID NITROGEN
Number Of Freeze-Thaw Cycles: 3 freeze-thaw cycles
Render Post-Care Instructions In Note?: no
Post-Care Instructions: I reviewed with the patient in detail post-care instructions. Patient is to wear sunprotection, and avoid picking at any of the treated lesions. Pt may apply Vaseline to crusted or scabbing areas.
Application Tool (Optional): Cry-AC
Duration Of Freeze Thaw-Cycle (Seconds): 3
Show Applicator Variable?: Yes
Detail Level: Detailed
Consent: The patient's consent was obtained including but not limited to risks of crusting, scabbing, blistering, scarring, darker or lighter pigmentary change, recurrence, incomplete removal and infection.

## 2022-12-19 NOTE — PROCEDURE: MIPS QUALITY
Detail Level: Detailed
Quality 431: Preventive Care And Screening: Unhealthy Alcohol Use - Screening: Patient not identified as an unhealthy alcohol user when screened for unhealthy alcohol use using a systematic screening method
Quality 402: Tobacco Use And Help With Quitting Among Adolescents: Patient screened for tobacco and is a smoker AND received Cessation Counseling
Quality 226: Preventive Care And Screening: Tobacco Use: Screening And Cessation Intervention: Patient screened for tobacco use, is a smoker AND received Cessation Counseling within the Previous 12 Months

## 2022-12-20 RX ORDER — FLUOROURACIL 5 MG/G
1 CREAM TOPICAL BID
Qty: 40 | Refills: 1 | Status: ERX | COMMUNITY
Start: 2022-12-19

## 2023-02-15 ENCOUNTER — RX ONLY (OUTPATIENT)
Age: 73
Setting detail: RX ONLY
End: 2023-02-15

## 2023-02-15 RX ORDER — IMIQUIMOD 12.5 MG/.25G
1 CREAM TOPICAL QD
Qty: 12 | Refills: 2 | Status: ERX | COMMUNITY
Start: 2023-02-14

## 2023-02-18 ENCOUNTER — APPOINTMENT (RX ONLY)
Dept: URBAN - METROPOLITAN AREA CLINIC 22 | Facility: CLINIC | Age: 73
Setting detail: DERMATOLOGY
End: 2023-02-18

## 2023-02-18 DIAGNOSIS — L57.0 ACTINIC KERATOSIS: ICD-10-CM

## 2023-02-18 PROBLEM — D04.39 CARCINOMA IN SITU OF SKIN OF OTHER PARTS OF FACE: Status: ACTIVE | Noted: 2023-02-18

## 2023-02-18 PROCEDURE — 13132 CMPLX RPR F/C/C/M/N/AX/G/H/F: CPT | Mod: 59

## 2023-02-18 PROCEDURE — 17000 DESTRUCT PREMALG LESION: CPT

## 2023-02-18 PROCEDURE — 17311 MOHS 1 STAGE H/N/HF/G: CPT

## 2023-02-18 PROCEDURE — ? MOHS SURGERY

## 2023-02-18 PROCEDURE — ? DIAGNOSIS COMMENT

## 2023-02-18 PROCEDURE — 17312 MOHS ADDL STAGE: CPT

## 2023-02-18 PROCEDURE — ? LIQUID NITROGEN

## 2023-02-18 ASSESSMENT — LOCATION DETAILED DESCRIPTION DERM: LOCATION DETAILED: RIGHT INFERIOR CENTRAL MALAR CHEEK

## 2023-02-18 ASSESSMENT — LOCATION ZONE DERM: LOCATION ZONE: FACE

## 2023-02-18 ASSESSMENT — LOCATION SIMPLE DESCRIPTION DERM: LOCATION SIMPLE: RIGHT CHEEK

## 2023-02-18 NOTE — PROCEDURE: LIQUID NITROGEN
Show Aperture Variable?: Yes
Consent: The patient's consent was obtained including but not limited to risks of crusting, scabbing, blistering, scarring, darker or lighter pigmentary change, recurrence, incomplete removal and infection.
Number Of Freeze-Thaw Cycles: 1 freeze-thaw cycle
Detail Level: Detailed
Duration Of Freeze Thaw-Cycle (Seconds): 5
Post-Care Instructions: I reviewed with the patient in detail post-care instructions. Patient is to wear sunprotection, and avoid picking at any of the treated lesions. Pt may apply Vaseline to crusted or scabbing areas.
Application Tool (Optional): Liquid Nitrogen Sprayer
Render Note In Bullet Format When Appropriate: No

## 2023-02-18 NOTE — PROCEDURE: MOHS SURGERY
Mohs Case Number: [id number]
Previous Accession (Optional): [id number].
Biopsy Photograph Reviewed: Yes
Referring Physician (Optional): Aubree Ireland, APRN
Consent Type: Consent 1 (Standard)
Eye Shield Used: No
Surgeon Performing Repair (Optional): Raul Lugo M.D.
Initial Size Of Lesion: 0.7
Number Of Stages: 3
Primary Defect Length In Cm (Final Defect Size - Required For Flaps/Grafts): 4.3
Primary Defect Width In Cm (Final Defect Size - Required For Flaps/Grafts): 1.5
Repair Type: Complex Repair
Oculoplastic Surgeon Procedure Text (A): After obtaining clear surgical margins the patient was sent to oculoplastics for surgical repair.  The patient understands they will receive post-surgical care and follow-up from the referring physician's office.
Otolaryngologist Procedure Text (A): After obtaining clear surgical margins the patient was sent to otolaryngology for surgical repair.  The patient understands they will receive post-surgical care and follow-up from the referring physician's office.
Plastic Surgeon Procedure Text (A): After obtaining clear surgical margins the patient was sent to plastics for surgical repair.  The patient understands they will receive post-surgical care and follow-up from the referring physician's office.
Mid-Level Procedure Text (A): After obtaining clear surgical margins the patient was sent to a mid-level provider for surgical repair.  The patient understands they will receive post-surgical care and follow-up from the mid-level provider.
Provider Procedure Text (A): After obtaining clear surgical margins the defect was repaired by another provider.
Asc Procedure Text (A): After obtaining clear surgical margins the patient was sent to an ASC for surgical repair.  The patient understands they will receive post-surgical care and follow-up from the ASC physician.
Simple / Intermediate / Complex Repair - Final Wound Length In Cm: 7.3
Suturegard Retention Suture: 2-0 Nylon
Retention Suture Bite Size: 3 mm
Length To Time In Minutes Device Was In Place: 10
Number Of Hemigard Strips Per Side: 1
Width Of Defect Perpendicular To Closure In Cm (Required): 1.7
Distance Of Undermining In Cm (Required): 1.8
Undermining Type: Entire Wound
Debridement Text: The wound edges were debrided prior to proceeding with the closure to facilitate wound healing.
Helical Rim Text: The closure involved the helical rim.
Vermilion Border Text: The closure involved the vermilion border.
Nostril Rim Text: The closure involved the nostril rim.
Retention Suture Text: Retention sutures were placed to support the closure and prevent dehiscence.
Secondary Defect Length In Cm (Required For Flaps): 0
Area H Indication Text: Tumors in this location are included in Area H (eyelids, eyebrows, nose, lips, chin, ear, pre-auricular, post-auricular, temple, genitalia, hands, feet, ankles and areola).  Tissue conservation is critical in these anatomic locations.
Area M Indication Text: Tumors in this location are included in Area M (cheek, forehead, scalp, neck, jawline and pretibial skin).  Mohs surgery is indicated for tumors in these anatomic locations.
Area L Indication Text: Tumors in this location are included in Area L (trunk and extremities).  Mohs surgery is indicated for larger tumors, or tumors with aggressive histologic features, in these anatomic locations.
Tumor Debulked?: curette
Depth Of Tumor Invasion (For Histology): epidermis
Perineural Invasion (For Histology - Be Specific If Possible): absent
Presence Of Scar Tissue (For Histology): present
Surgical Defect Width In Cm (Optional): 1.0
Special Stains Stage 1 - Results: Base On Clearance Noted Above
Stage 2: Additional Anesthesia Volume In Cc: 0.5
Stage 2: Additional Anesthesia Type: 1% lidocaine with epinephrine and a 1:10 solution of 8.4% sodium bicarbonate
Surgical Defect Length In Cm (Optional): 1.6
Stage 3: Additional Anesthesia Volume In Cc: 2
Surgical Defect Length In Cm (Optional): 2.9
Stage 4: Additional Anesthesia Volume In Cc: 1.4
Stage 4: Additional Anesthesia Type: 1% lidocaine with epinephrine
Include Tumor Staging In Mohs Note?: Please Select the Appropriate Response
Staging Info: By selecting yes to the question above you will include information on AJCC 8 tumor staging in your Mohs note. Information on tumor staging will be automatically added for SCCs on the head and neck. AJCC 8 includes tumor size, tumor depth, perineural involvement and bone invasion.
Tumor Depth: Less than 6mm from granular layer and no invasion beyond the subcutaneous fat
Medical Necessity Statement: Based on my medical judgement, Mohs surgery is the most appropriate treatment for this cancer compared to other treatments.
Alternatives Discussed Intro (Do Not Add Period): I discussed alternative treatments to Mohs surgery and specifically discussed the risks and benefits of
Consent 1/Introductory Paragraph: The rationale for Mohs was explained to the patient and consent was obtained. The risks, benefits and alternatives to therapy were discussed in detail. Specifically, the risks of infection, scarring, bleeding, prolonged wound healing, incomplete removal, allergy to anesthesia, nerve injury and recurrence were addressed. Prior to the procedure, the treatment site was clearly identified and confirmed by the patient. All components of Universal Protocol/PAUSE Rule completed.
Consent 2/Introductory Paragraph: Mohs surgery was explained to the patient and consent was obtained. The risks, benefits and alternatives to therapy were discussed in detail. Specifically, the risks of infection, scarring, bleeding, prolonged wound healing, incomplete removal, allergy to anesthesia, nerve injury and recurrence were addressed. Prior to the procedure, the treatment site was clearly identified and confirmed by the patient. All components of Universal Protocol/PAUSE Rule completed.
Consent 3/Introductory Paragraph: I gave the patient a chance to ask questions they had about the procedure.  Following this I explained the Mohs procedure and consent was obtained. The risks, benefits and alternatives to therapy were discussed in detail. Specifically, the risks of infection, scarring, bleeding, prolonged wound healing, incomplete removal, allergy to anesthesia, nerve injury and recurrence were addressed. Prior to the procedure, the treatment site was clearly identified and confirmed by the patient. All components of Universal Protocol/PAUSE Rule completed.
Consent (Temporal Branch)/Introductory Paragraph: The rationale for Mohs was explained to the patient and consent was obtained. The risks, benefits and alternatives to therapy were discussed in detail. Specifically, the risks of damage to the temporal branch of the facial nerve, infection, scarring, bleeding, prolonged wound healing, incomplete removal, allergy to anesthesia, and recurrence were addressed. Prior to the procedure, the treatment site was clearly identified and confirmed by the patient. All components of Universal Protocol/PAUSE Rule completed.
Consent (Marginal Mandibular)/Introductory Paragraph: The rationale for Mohs was explained to the patient and consent was obtained. The risks, benefits and alternatives to therapy were discussed in detail. Specifically, the risks of damage to the marginal mandibular branch of the facial nerve, infection, scarring, bleeding, prolonged wound healing, incomplete removal, allergy to anesthesia, and recurrence were addressed. Prior to the procedure, the treatment site was clearly identified and confirmed by the patient. All components of Universal Protocol/PAUSE Rule completed.
Consent (Spinal Accessory)/Introductory Paragraph: The rationale for Mohs was explained to the patient and consent was obtained. The risks, benefits and alternatives to therapy were discussed in detail. Specifically, the risks of damage to the spinal accessory nerve, infection, scarring, bleeding, prolonged wound healing, incomplete removal, allergy to anesthesia, and recurrence were addressed. Prior to the procedure, the treatment site was clearly identified and confirmed by the patient. All components of Universal Protocol/PAUSE Rule completed.
Consent (Near Eyelid Margin)/Introductory Paragraph: The rationale for Mohs was explained to the patient and consent was obtained. The risks, benefits and alternatives to therapy were discussed in detail. Specifically, the risks of ectropion or eyelid deformity, infection, scarring, bleeding, prolonged wound healing, incomplete removal, allergy to anesthesia, nerve injury and recurrence were addressed. Prior to the procedure, the treatment site was clearly identified and confirmed by the patient. All components of Universal Protocol/PAUSE Rule completed.
Consent (Ear)/Introductory Paragraph: The rationale for Mohs was explained to the patient and consent was obtained. The risks, benefits and alternatives to therapy were discussed in detail. Specifically, the risks of ear deformity, infection, scarring, bleeding, prolonged wound healing, incomplete removal, allergy to anesthesia, nerve injury and recurrence were addressed. Prior to the procedure, the treatment site was clearly identified and confirmed by the patient. All components of Universal Protocol/PAUSE Rule completed.
Consent (Nose)/Introductory Paragraph: The rationale for Mohs was explained to the patient and consent was obtained. The risks, benefits and alternatives to therapy were discussed in detail. Specifically, the risks of nasal deformity, changes in the flow of air through the nose, infection, scarring, bleeding, prolonged wound healing, incomplete removal, allergy to anesthesia, nerve injury and recurrence were addressed. Prior to the procedure, the treatment site was clearly identified and confirmed by the patient. All components of Universal Protocol/PAUSE Rule completed.
Consent (Lip)/Introductory Paragraph: The rationale for Mohs was explained to the patient and consent was obtained. The risks, benefits and alternatives to therapy were discussed in detail. Specifically, the risks of lip deformity, changes in the oral aperture, infection, scarring, bleeding, prolonged wound healing, incomplete removal, allergy to anesthesia, nerve injury and recurrence were addressed. Prior to the procedure, the treatment site was clearly identified and confirmed by the patient. All components of Universal Protocol/PAUSE Rule completed.
Consent (Scalp)/Introductory Paragraph: The rationale for Mohs was explained to the patient and consent was obtained. The risks, benefits and alternatives to therapy were discussed in detail. Specifically, the risks of changes in hair growth pattern secondary to repair, infection, scarring, bleeding, prolonged wound healing, incomplete removal, allergy to anesthesia, nerve injury and recurrence were addressed. Prior to the procedure, the treatment site was clearly identified and confirmed by the patient. All components of Universal Protocol/PAUSE Rule completed.
Detail Level: Detailed
Postop Diagnosis: same
Anesthesia Volume In Cc: 14.4
Additional Anesthesia Volume In Cc: 6
Hemostasis: Electricator
Estimated Blood Loss (Cc): minimal
Repair Anesthesia Method: local infiltration
Repair Anesthesia Type: 1% lidocaine with 1:100,000 epinephrine and a 1:10 solution of 8.4% sodium bicarbonate
Anesthesia Volume In Cc: 7.5
Brow Lift Text: A midfrontal incision was made medially to the defect to allow access to the tissues just superior to the left eyebrow. Following careful dissection inferiorly in a supraperiosteal plane to the level of the left eyebrow, several 3-0 monocryl sutures were used to resuspend the eyebrow orbicularis oculi muscular unit to the superior frontal bone periosteum. This resulted in an appropriate reapproximation of static eyebrow symmetry and correction of the left brow ptosis.
Deep Sutures: 4-0 Maxon
Epidermal Sutures: 6-0 Surgipro
Epidermal Closure: running
Suturegard Intro: Intraoperative tissue expansion was performed, utilizing the SUTUREGARD device, in order to reduce wound tension.
Suturegard Body: The suture ends were repeatedly re-tightened and re-clamped to achieve the desired tissue expansion.
Hemigard Intro: Due to skin fragility and wound tension, it was decided to use HEMIGARD adhesive retention suture devices to permit a linear closure. The skin was cleaned and dried for a 6cm distance away from the wound. Excessive hair, if present, was removed to allow for adhesion.
Hemigard Postcare Instructions: The HEMIGARD strips are to remain completely dry for at least 5-7 days.
Donor Site Anesthesia Type: same as repair anesthesia
Epidermal Closure Graft Donor Site (Optional): simple interrupted
Graft Donor Site Bandage (Optional-Leave Blank If You Don't Want In Note): Steri-strips and a pressure bandage were applied to the donor site.
Closure 2 Information: This tab is for additional flaps and grafts, including complex repair and grafts and complex repair and flaps. You can also specify a different location for the additional defect, if the location is the same you do not need to select a new one. We will insert the automated text for the repair you select below just as we do for solitary flaps and grafts. Please note that at this time if you select a location with a different insurance zone you will need to override the ICD10 and CPT if appropriate.
Closure 3 Information: This tab is for additional flaps and grafts above and beyond our usual structured repairs.  Please note if you enter information here it will not currently bill and you will need to add the billing information manually.
Wound Care: Petrolatum
Dressing: pressure dressing with telfa
Dressing (No Sutures): dry sterile dressing
Suture Removal: 7 days
Unna Boot Text: An Unna boot was placed to help immobilize the limb and facilitate more rapid healing.
Home Suture Removal Text: Patient was provided instructions on removing sutures and will remove their sutures at home.  If they have any questions or difficulties they will call the office.
Post-Care Instructions: I reviewed with the patient in detail post-care instructions. Patient is not to engage in any heavy lifting, exercise, or swimming for the next 14 days. Should the patient develop any fevers, chills, bleeding, severe pain patient will contact the office immediately.
Pain Refusal Text: I offered to prescribe pain medication but the patient refused to take this medication.
Mauc Instructions: By selecting yes to the question below the MAUC number will be added into the note.  This will be calculated automatically based on the diagnosis chosen, the size entered, the body zone selected (H,M,L) and the specific indications you chose. You will also have the option to override the Mohs AUC if you disagree with the automatically calculated number and this option is found in the Case Summary tab.
Where Do You Want The Question To Include Opioid Counseling Located?: Case Summary Tab
Eye Protection Verbiage: Before proceeding with the stage, a plastic scleral shield was inserted. The globe was anesthetized with a few drops of 1% lidocaine with 1:100,000 epinephrine. Then, an appropriate sized scleral shield was chosen and coated with lacrilube ointment. The shield was gently inserted and left in place for the duration of each stage. After the stage was completed, the shield was gently removed.
Mohs Method Verbiage: An incision at a 45 degree angle following the standard Mohs approach was done and the specimen was harvested as a microscopic controlled layer.
Surgeon/Pathologist Verbiage (Will Incorporate Name Of Surgeon From Intro If Not Blank): operated in two distinct and integrated capacities as the surgeon and pathologist.
Mohs Histo Method Verbiage: Each section was then chromacoded and processed in the Mohs lab using the Mohs protocol and submitted for frozen section.
Subsequent Stages Histo Method Verbiage: Using a similar technique to that described above, a thin layer of tissue was removed from all areas where tumor was visible on the previous stage.  The tissue was again oriented, mapped, dyed, and processed as above.
Mohs Rapid Report Verbiage: The area of clinically evident tumor was marked with skin marking ink and appropriately hatched.  The initial incision was made following the Mohs approach through the skin.  The specimen was taken to the lab, divided into the necessary number of pieces, chromacoded and processed according to the Mohs protocol.  This was repeated in successive stages until a tumor free defect was achieved.
Complex Repair Preamble Text (Leave Blank If You Do Not Want): Extensive wide undermining was performed.
Intermediate Repair Preamble Text (Leave Blank If You Do Not Want): Undermining was performed with blunt dissection.
Non-Graft Cartilage Fenestration Text: The cartilage was fenestrated with a 2mm punch biopsy to help facilitate healing.
Graft Cartilage Fenestration Text: The cartilage was fenestrated with a 2mm punch biopsy to help facilitate graft survival and healing.
Secondary Intention Text (Leave Blank If You Do Not Want): The defect will heal with secondary intention.
No Repair - Repaired With Adjacent Surgical Defect Text (Leave Blank If You Do Not Want): After obtaining clear surgical margins the defect was repaired concurrently with another surgical defect which was in close approximation.
Adjacent Tissue Transfer Text: The defect edges were debeveled with a #15 scalpel blade. Given the location of the defect and the proximity to free margins an adjacent tissue transfer was deemed most appropriate. Using a sterile surgical marker, an appropriate flap was drawn incorporating the defect and placing the expected incisions within the relaxed skin tension lines where possible. The area thus outlined was incised deep to adipose tissue with a #15 scalpel blade. The skin margins were undermined to an appropriate distance in all directions utilizing iris scissors.
Advancement Flap (Single) Text: The defect edges were debeveled with a #15 scalpel blade.  Given the location of the defect and the proximity to free margins a single advancement flap was deemed most appropriate.  Using a sterile surgical marker, an appropriate advancement flap was drawn incorporating the defect and placing the expected incisions within the relaxed skin tension lines where possible.    The area thus outlined was incised deep to adipose tissue with a #15 scalpel blade.  The skin margins were undermined to an appropriate distance in all directions utilizing iris scissors.
Advancement Flap (Double) Text: The defect edges were debeveled with a #15 scalpel blade.  Given the location of the defect and the proximity to free margins a double advancement flap was deemed most appropriate.  Using a sterile surgical marker, the appropriate advancement flaps were drawn incorporating the defect and placing the expected incisions within the relaxed skin tension lines where possible.    The area thus outlined was incised deep to adipose tissue with a #15 scalpel blade.  The skin margins were undermined to an appropriate distance in all directions utilizing iris scissors.
Burow's Advancement Flap Text: The defect edges were debeveled with a #15 scalpel blade.  Given the location of the defect and the proximity to free margins a Burow's advancement flap was deemed most appropriate.  Using a sterile surgical marker, the appropriate advancement flap was drawn incorporating the defect and placing the expected incisions within the relaxed skin tension lines where possible.    The area thus outlined was incised deep to adipose tissue with a #15 scalpel blade.  The skin margins were undermined to an appropriate distance in all directions utilizing iris scissors.
Chonodrocutaneous Helical Advancement Flap Text: The defect edges were debeveled with a #15 scalpel blade. Given the location of the defect and the proximity to free margins a chondrocutaneous helical advancement flap was deemed most appropriate. Using a sterile surgical marker, the appropriate advancement flap was drawn incorporating the defect and placing the expected incisions within the relaxed skin tension lines where possible. The area thus outlined was incised deep to adipose tissue with a #15 scalpel blade. The skin margins were undermined to an appropriate distance in all directions utilizing iris scissors. Following this, the designed flap was advanced into the primary defect and sutured into place.
Crescentic Advancement Flap Text: The defect edges were debeveled with a #15 scalpel blade.  Given the location of the defect and the proximity to free margins a crescentic advancement flap was deemed most appropriate.  Using a sterile surgical marker, the appropriate advancement flap was drawn incorporating the defect and placing the expected incisions within the relaxed skin tension lines where possible.    The area thus outlined was incised deep to adipose tissue with a #15 scalpel blade.  The skin margins were undermined to an appropriate distance in all directions utilizing iris scissors.
A-T Advancement Flap Text: The defect edges were debeveled with a #15 scalpel blade.  Given the location of the defect, shape of the defect and the proximity to free margins an A-T advancement flap was deemed most appropriate.  Using a sterile surgical marker, an appropriate advancement flap was drawn incorporating the defect and placing the expected incisions within the relaxed skin tension lines where possible.    The area thus outlined was incised deep to adipose tissue with a #15 scalpel blade.  The skin margins were undermined to an appropriate distance in all directions utilizing iris scissors.
O-T Advancement Flap Text: The defect edges were debeveled with a #15 scalpel blade.  Given the location of the defect, shape of the defect and the proximity to free margins an O-T advancement flap was deemed most appropriate.  Using a sterile surgical marker, an appropriate advancement flap was drawn incorporating the defect and placing the expected incisions within the relaxed skin tension lines where possible.    The area thus outlined was incised deep to adipose tissue with a #15 scalpel blade.  The skin margins were undermined to an appropriate distance in all directions utilizing iris scissors.
O-L Flap Text: The defect edges were debeveled with a #15 scalpel blade.  Given the location of the defect, shape of the defect and the proximity to free margins an O-L flap was deemed most appropriate.  Using a sterile surgical marker, an appropriate advancement flap was drawn incorporating the defect and placing the expected incisions within the relaxed skin tension lines where possible.    The area thus outlined was incised deep to adipose tissue with a #15 scalpel blade.  The skin margins were undermined to an appropriate distance in all directions utilizing iris scissors.
O-Z Flap Text: The defect edges were debeveled with a #15 scalpel blade. Given the location of the defect, shape of the defect and the proximity to free margins an O-Z flap was deemed most appropriate. Using a sterile surgical marker, an appropriate transposition flap was drawn incorporating the defect and placing the expected incisions within the relaxed skin tension lines where possible. The area thus outlined was incised deep to adipose tissue with a #15 scalpel blade. The skin margins were undermined to an appropriate distance in all directions utilizing iris scissors. Following this, the designed flap was placed into the primary defect and sutured into place.
Double O-Z Flap Text: The defect edges were debeveled with a #15 scalpel blade. Given the location of the defect, shape of the defect and the proximity to free margins a Double O-Z flap was deemed most appropriate. Using a sterile surgical marker, an appropriate transposition flap was drawn incorporating the defect and placing the expected incisions within the relaxed skin tension lines where possible. The area thus outlined was incised deep to adipose tissue with a #15 scalpel blade. The skin margins were undermined to an appropriate distance in all directions utilizing iris scissors. Following this, the designed flap was placed into the primary defect and sutured into place.
V-Y Flap Text: The defect edges were debeveled with a #15 scalpel blade.  Given the location of the defect, shape of the defect and the proximity to free margins a V-Y flap was deemed most appropriate.  Using a sterile surgical marker, an appropriate advancement flap was drawn incorporating the defect and placing the expected incisions within the relaxed skin tension lines where possible.    The area thus outlined was incised deep to adipose tissue with a #15 scalpel blade.  The skin margins were undermined to an appropriate distance in all directions utilizing iris scissors.
Advancement-Rotation Flap Text: The defect edges were debeveled with a #15 scalpel blade.  Given the location of the defect, shape of the defect and the proximity to free margins an advancement-rotation flap was deemed most appropriate.  Using a sterile surgical marker, an appropriate flap was drawn incorporating the defect and placing the expected incisions within the relaxed skin tension lines where possible. The area thus outlined was incised deep to adipose tissue with a #15 scalpel blade.  The skin margins were undermined to an appropriate distance in all directions utilizing iris scissors.
Mercedes Flap Text: The defect edges were debeveled with a #15 scalpel blade.  Given the location of the defect, shape of the defect and the proximity to free margins a Mercedes flap was deemed most appropriate.  Using a sterile surgical marker, an appropriate advancement flap was drawn incorporating the defect and placing the expected incisions within the relaxed skin tension lines where possible. The area thus outlined was incised deep to adipose tissue with a #15 scalpel blade.  The skin margins were undermined to an appropriate distance in all directions utilizing iris scissors.
Modified Advancement Flap Text: The defect edges were debeveled with a #15 scalpel blade.  Given the location of the defect, shape of the defect and the proximity to free margins a modified advancement flap was deemed most appropriate.  Using a sterile surgical marker, an appropriate advancement flap was drawn incorporating the defect and placing the expected incisions within the relaxed skin tension lines where possible.    The area thus outlined was incised deep to adipose tissue with a #15 scalpel blade.  The skin margins were undermined to an appropriate distance in all directions utilizing iris scissors.
Mucosal Advancement Flap Text: Given the location of the defect, shape of the defect and the proximity to free margins a mucosal advancement flap was deemed most appropriate. Incisions were made with a 15 blade scalpel in the appropriate fashion along the cutaneous vermilion border and the mucosal lip. The remaining actinically damaged mucosal tissue was excised.  The mucosal advancement flap was then elevated to the gingival sulcus with care taken to preserve the neurovascular structures and advanced into the primary defect. Care was taken to ensure that precise realignment of the vermilion border was achieved.
Peng Advancement Flap Text: The defect edges were debeveled with a #15 scalpel blade. Given the location of the defect, shape of the defect and the proximity to free margins a Peng advancement flap was deemed most appropriate. Using a sterile surgical marker, an appropriate advancement flap was drawn incorporating the defect and placing the expected incisions within the relaxed skin tension lines where possible. The area thus outlined was incised deep to adipose tissue with a #15 scalpel blade. The skin margins were undermined to an appropriate distance in all directions utilizing iris scissors. Following this, the designed flap was advanced into the primary defect and sutured into place.
Hatchet Flap Text: The defect edges were debeveled with a #15 scalpel blade.  Given the location of the defect, shape of the defect and the proximity to free margins a hatchet flap was deemed most appropriate.  Using a sterile surgical marker, an appropriate hatchet flap was drawn incorporating the defect and placing the expected incisions within the relaxed skin tension lines where possible.    The area thus outlined was incised deep to adipose tissue with a #15 scalpel blade.  The skin margins were undermined to an appropriate distance in all directions utilizing iris scissors.
Rotation Flap Text: The defect edges were debeveled with a #15 scalpel blade.  Given the location of the defect, shape of the defect and the proximity to free margins a rotation flap was deemed most appropriate.  Using a sterile surgical marker, an appropriate rotation flap was drawn incorporating the defect and placing the expected incisions within the relaxed skin tension lines where possible.    The area thus outlined was incised deep to adipose tissue with a #15 scalpel blade.  The skin margins were undermined to an appropriate distance in all directions utilizing iris scissors.
Spiral Flap Text: The defect edges were debeveled with a #15 scalpel blade.  Given the location of the defect, shape of the defect and the proximity to free margins a spiral flap was deemed most appropriate.  Using a sterile surgical marker, an appropriate rotation flap was drawn incorporating the defect and placing the expected incisions within the relaxed skin tension lines where possible. The area thus outlined was incised deep to adipose tissue with a #15 scalpel blade.  The skin margins were undermined to an appropriate distance in all directions utilizing iris scissors.
Staged Advancement Flap Text: The defect edges were debeveled with a #15 scalpel blade. Given the location of the defect, shape of the defect and the proximity to free margins a staged advancement flap was deemed most appropriate. Using a sterile surgical marker, an appropriate advancement flap was drawn incorporating the defect and placing the expected incisions within the relaxed skin tension lines where possible. The area thus outlined was incised deep to adipose tissue with a #15 scalpel blade. The skin margins were undermined to an appropriate distance in all directions utilizing iris scissors. Following this, the designed flap was placed into the primary defect and sutured into place.
Star Wedge Flap Text: The defect edges were debeveled with a #15 scalpel blade.  Given the location of the defect, shape of the defect and the proximity to free margins a star wedge flap was deemed most appropriate.  Using a sterile surgical marker, an appropriate rotation flap was drawn incorporating the defect and placing the expected incisions within the relaxed skin tension lines where possible. The area thus outlined was incised deep to adipose tissue with a #15 scalpel blade.  The skin margins were undermined to an appropriate distance in all directions utilizing iris scissors.
Transposition Flap Text: The defect edges were debeveled with a #15 scalpel blade.  Given the location of the defect and the proximity to free margins a transposition flap was deemed most appropriate.  Using a sterile surgical marker, an appropriate transposition flap was drawn incorporating the defect.    The area thus outlined was incised deep to adipose tissue with a #15 scalpel blade.  The skin margins were undermined to an appropriate distance in all directions utilizing iris scissors.
Muscle Hinge Flap Text: The defect edges were debeveled with a #15 scalpel blade.  Given the size, depth and location of the defect and the proximity to free margins a muscle hinge flap was deemed most appropriate.  Using a sterile surgical marker, an appropriate hinge flap was drawn incorporating the defect. The area thus outlined was incised with a #15 scalpel blade.  The skin margins were undermined to an appropriate distance in all directions utilizing iris scissors.
Mustarde Flap Text: The defect edges were debeveled with a #15 scalpel blade.  Given the size, depth and location of the defect and the proximity to free margins a Mustarde flap was deemed most appropriate. Using a sterile surgical marker, an appropriate flap was drawn incorporating the defect. The area thus outlined was incised with a #15 scalpel blade. The skin margins were undermined to an appropriate distance in all directions utilizing iris scissors. Following this, the designed flap was placed in the primary defect and sutured into place.
Nasal Turnover Hinge Flap Text: The defect edges were debeveled with a #15 scalpel blade.  Given the size, depth, location of the defect and the defect being full thickness a nasal turnover hinge flap was deemed most appropriate. Using a sterile surgical marker, an appropriate hinge flap was drawn incorporating the defect. The area thus outlined was incised with a #15 scalpel blade. The flap was designed to recreate the nasal mucosal lining and the alar rim. The skin margins were undermined to an appropriate distance in all directions utilizing iris scissors. Following this, the designed flap was placed into the primary defect and sutured into place
Nasalis-Muscle-Based Myocutaneous Island Pedicle Flap Text: Using a #15 blade, an incision was made around the donor flap to the level of the nasalis muscle. Wide lateral undermining was then performed in both the subcutaneous plane above the nasalis muscle, and in a submuscular plane just above periosteum. This allowed the formation of a free nasalis muscle axial pedicle (based on the angular artery) which was still attached to the actual cutaneous flap, increasing its mobility and vascular viability. Hemostasis was obtained with pinpoint electrocoagulation. The flap was mobilized into position and the pivotal anchor points positioned and stabilized with buried interrupted sutures. Subcutaneous and dermal tissues were closed in a multilayered fashion with sutures. Tissue redundancies were excised, and the epidermal edges were apposed without significant tension and sutured with sutures.
Orbicularis Oris Muscle Flap Text: The defect edges were debeveled with a #15 scalpel blade.  Given that the defect affected the competency of the oral sphincter an orbicularis oris muscle flap was deemed most appropriate to restore this competency and normal muscle function.  Using a sterile surgical marker, an appropriate flap was drawn incorporating the defect. The area thus outlined was incised with a #15 scalpel blade. Following this, the designed flap was placed into the primary defect and sutured into place.
Melolabial Transposition Flap Text: The defect edges were debeveled with a #15 scalpel blade.  Given the location of the defect and the proximity to free margins a melolabial flap was deemed most appropriate.  Using a sterile surgical marker, an appropriate melolabial transposition flap was drawn incorporating the defect.    The area thus outlined was incised deep to adipose tissue with a #15 scalpel blade.  The skin margins were undermined to an appropriate distance in all directions utilizing iris scissors.
Rhombic Flap Text: The defect edges were debeveled with a #15 scalpel blade.  Given the location of the defect and the proximity to free margins a rhombic flap was deemed most appropriate.  Using a sterile surgical marker, an appropriate rhombic flap was drawn incorporating the defect.    The area thus outlined was incised deep to adipose tissue with a #15 scalpel blade.  The skin margins were undermined to an appropriate distance in all directions utilizing iris scissors.
Rhomboid Transposition Flap Text: The defect edges were debeveled with a #15 scalpel blade. Given the location of the defect and the proximity to free margins a rhomboid transposition flap was deemed most appropriate. Using a sterile surgical marker, an appropriate rhomboid flap was drawn incorporating the defect. The area thus outlined was incised deep to adipose tissue with a #15 scalpel blade. The skin margins were undermined to an appropriate distance in all directions utilizing iris scissors. Following this, the designed flap was placed into the primary defect and sutured into place.
Bi-Rhombic Flap Text: The defect edges were debeveled with a #15 scalpel blade.  Given the location of the defect and the proximity to free margins a bi-rhombic flap was deemed most appropriate.  Using a sterile surgical marker, an appropriate rhombic flap was drawn incorporating the defect. The area thus outlined was incised deep to adipose tissue with a #15 scalpel blade.  The skin margins were undermined to an appropriate distance in all directions utilizing iris scissors.
Helical Rim Advancement Flap Text: The defect edges were debeveled with a #15 blade scalpel.  Given the location of the defect and the proximity to free margins (helical rim) a double helical rim advancement flap was deemed most appropriate.  Using a sterile surgical marker, the appropriate advancement flaps were drawn incorporating the defect and placing the expected incisions between the helical rim and antihelix where possible.  The area thus outlined was incised through and through with a #15 scalpel blade.  With a skin hook and iris scissors, the flaps were gently and sharply undermined and freed up.
Bilateral Helical Rim Advancement Flap Text: The defect edges were debeveled with a #15 blade scalpel.  Given the location of the defect and the proximity to free margins (helical rim) a bilateral helical rim advancement flap was deemed most appropriate.  Using a sterile surgical marker, the appropriate advancement flaps were drawn incorporating the defect and placing the expected incisions between the helical rim and antihelix where possible.  The area thus outlined was incised through and through with a #15 scalpel blade.  With a skin hook and iris scissors, the flaps were gently and sharply undermined and freed up.
Ear Star Wedge Flap Text: The defect edges were debeveled with a #15 blade scalpel.  Given the location of the defect and the proximity to free margins (helical rim) an ear star wedge flap was deemed most appropriate.  Using a sterile surgical marker, the appropriate flap was drawn incorporating the defect and placing the expected incisions between the helical rim and antihelix where possible.  The area thus outlined was incised through and through with a #15 scalpel blade.
Banner Transposition Flap Text: The defect edges were debeveled with a #15 scalpel blade.  Given the location of the defect and the proximity to free margins a Banner transposition flap was deemed most appropriate.  Using a sterile surgical marker, an appropriate flap drawn around the defect. The area thus outlined was incised deep to adipose tissue with a #15 scalpel blade.  The skin margins were undermined to an appropriate distance in all directions utilizing iris scissors.
Bilobed Flap Text: The defect edges were debeveled with a #15 scalpel blade.  Given the location of the defect and the proximity to free margins a bilobe flap was deemed most appropriate.  Using a sterile surgical marker, an appropriate bilobe flap drawn around the defect.    The area thus outlined was incised deep to adipose tissue with a #15 scalpel blade.  The skin margins were undermined to an appropriate distance in all directions utilizing iris scissors.
Bilobed Transposition Flap Text: The defect edges were debeveled with a #15 scalpel blade.  Given the location of the defect and the proximity to free margins a bilobed transposition flap was deemed most appropriate.  Using a sterile surgical marker, an appropriate bilobe flap drawn around the defect.    The area thus outlined was incised deep to adipose tissue with a #15 scalpel blade.  The skin margins were undermined to an appropriate distance in all directions utilizing iris scissors.
Trilobed Flap Text: The defect edges were debeveled with a #15 scalpel blade.  Given the location of the defect and the proximity to free margins a trilobed flap was deemed most appropriate.  Using a sterile surgical marker, an appropriate trilobed flap drawn around the defect.    The area thus outlined was incised deep to adipose tissue with a #15 scalpel blade.  The skin margins were undermined to an appropriate distance in all directions utilizing iris scissors.
Dorsal Nasal Flap Text: The defect edges were debeveled with a #15 scalpel blade.  Given the location of the defect and the proximity to free margins a dorsal nasal flap was deemed most appropriate.  Using a sterile surgical marker, an appropriate dorsal nasal flap was drawn around the defect.    The area thus outlined was incised deep to adipose tissue with a #15 scalpel blade.  The skin margins were undermined to an appropriate distance in all directions utilizing iris scissors.
Island Pedicle Flap Text: The defect edges were debeveled with a #15 scalpel blade.  Given the location of the defect, shape of the defect and the proximity to free margins an island pedicle advancement flap was deemed most appropriate.  Using a sterile surgical marker, an appropriate advancement flap was drawn incorporating the defect, outlining the appropriate donor tissue and placing the expected incisions within the relaxed skin tension lines where possible.    The area thus outlined was incised deep to adipose tissue with a #15 scalpel blade.  The skin margins were undermined to an appropriate distance in all directions around the primary defect and laterally outward around the island pedicle utilizing iris scissors.  There was minimal undermining beneath the pedicle flap.
Island Pedicle Flap With Canthal Suspension Text: The defect edges were debeveled with a #15 scalpel blade.  Given the location of the defect, shape of the defect and the proximity to free margins an island pedicle advancement flap was deemed most appropriate.  Using a sterile surgical marker, an appropriate advancement flap was drawn incorporating the defect, outlining the appropriate donor tissue and placing the expected incisions within the relaxed skin tension lines where possible. The area thus outlined was incised deep to adipose tissue with a #15 scalpel blade.  The skin margins were undermined to an appropriate distance in all directions around the primary defect and laterally outward around the island pedicle utilizing iris scissors.  There was minimal undermining beneath the pedicle flap. A suspension suture was placed in the canthal tendon to prevent tension and prevent ectropion.
Alar Island Pedicle Flap Text: The defect edges were debeveled with a #15 scalpel blade.  Given the location of the defect, shape of the defect and the proximity to the alar rim an island pedicle advancement flap was deemed most appropriate.  Using a sterile surgical marker, an appropriate advancement flap was drawn incorporating the defect, outlining the appropriate donor tissue and placing the expected incisions within the nasal ala running parallel to the alar rim. The area thus outlined was incised with a #15 scalpel blade.  The skin margins were undermined minimally to an appropriate distance in all directions around the primary defect and laterally outward around the island pedicle utilizing iris scissors.  There was minimal undermining beneath the pedicle flap.
Double Island Pedicle Flap Text: The defect edges were debeveled with a #15 scalpel blade.  Given the location of the defect, shape of the defect and the proximity to free margins a double island pedicle advancement flap was deemed most appropriate.  Using a sterile surgical marker, an appropriate advancement flap was drawn incorporating the defect, outlining the appropriate donor tissue and placing the expected incisions within the relaxed skin tension lines where possible.    The area thus outlined was incised deep to adipose tissue with a #15 scalpel blade.  The skin margins were undermined to an appropriate distance in all directions around the primary defect and laterally outward around the island pedicle utilizing iris scissors.  There was minimal undermining beneath the pedicle flap.
Island Pedicle Flap-Requiring Vessel Identification Text: The defect edges were debeveled with a #15 scalpel blade.  Given the location of the defect, shape of the defect and the proximity to free margins an island pedicle advancement flap was deemed most appropriate.  Using a sterile surgical marker, an appropriate advancement flap was drawn, based on the axial vessel mentioned above, incorporating the defect, outlining the appropriate donor tissue and placing the expected incisions within the relaxed skin tension lines where possible.    The area thus outlined was incised deep to adipose tissue with a #15 scalpel blade.  The skin margins were undermined to an appropriate distance in all directions around the primary defect and laterally outward around the island pedicle utilizing iris scissors.  There was minimal undermining beneath the pedicle flap.
Keystone Flap Text: The defect edges were debeveled with a #15 scalpel blade.  Given the location of the defect, shape of the defect a keystone flap was deemed most appropriate.  Using a sterile surgical marker, an appropriate keystone flap was drawn incorporating the defect, outlining the appropriate donor tissue and placing the expected incisions within the relaxed skin tension lines where possible. The area thus outlined was incised deep to adipose tissue with a #15 scalpel blade.  The skin margins were undermined to an appropriate distance in all directions around the primary defect and laterally outward around the flap utilizing iris scissors.
O-T Plasty Text: The defect edges were debeveled with a #15 scalpel blade.  Given the location of the defect, shape of the defect and the proximity to free margins an O-T plasty was deemed most appropriate.  Using a sterile surgical marker, an appropriate O-T plasty was drawn incorporating the defect and placing the expected incisions within the relaxed skin tension lines where possible.    The area thus outlined was incised deep to adipose tissue with a #15 scalpel blade.  The skin margins were undermined to an appropriate distance in all directions utilizing iris scissors.
O-Z Plasty Text: The defect edges were debeveled with a #15 scalpel blade.  Given the location of the defect, shape of the defect and the proximity to free margins an O-Z plasty (double transposition flap) was deemed most appropriate.  Using a sterile surgical marker, the appropriate transposition flaps were drawn incorporating the defect and placing the expected incisions within the relaxed skin tension lines where possible.    The area thus outlined was incised deep to adipose tissue with a #15 scalpel blade.  The skin margins were undermined to an appropriate distance in all directions utilizing iris scissors.  Hemostasis was achieved with electrocautery.  The flaps were then transposed into place, one clockwise and the other counterclockwise, and anchored with interrupted buried subcutaneous sutures.
Double O-Z Plasty Text: The defect edges were debeveled with a #15 scalpel blade. Given the location of the defect, shape of the defect and the proximity to free margins a Double O-Z plasty (double transposition flap) was deemed most appropriate. Using a sterile surgical marker, the appropriate transposition flaps were drawn incorporating the defect and placing the expected incisions within the relaxed skin tension lines where possible. The area thus outlined was incised deep to adipose tissue with a #15 scalpel blade. The skin margins were undermined to an appropriate distance in all directions utilizing iris scissors.  Hemostasis was achieved with electrocautery.  The flaps were then transposed into place, one clockwise and the other counterclockwise, and anchored with interrupted buried subcutaneous sutures.
V-Y Plasty Text: The defect edges were debeveled with a #15 scalpel blade.  Given the location of the defect, shape of the defect and the proximity to free margins an V-Y advancement flap was deemed most appropriate.  Using a sterile surgical marker, an appropriate advancement flap was drawn incorporating the defect and placing the expected incisions within the relaxed skin tension lines where possible.    The area thus outlined was incised deep to adipose tissue with a #15 scalpel blade.  The skin margins were undermined to an appropriate distance in all directions utilizing iris scissors.
H Plasty Text: Given the location of the defect, shape of the defect and the proximity to free margins a H-plasty was deemed most appropriate for repair.  Using a sterile surgical marker, the appropriate advancement arms of the H-plasty were drawn incorporating the defect and placing the expected incisions within the relaxed skin tension lines where possible. The area thus outlined was incised deep to adipose tissue with a #15 scalpel blade. The skin margins were undermined to an appropriate distance in all directions utilizing iris scissors.  The opposing advancement arms were then advanced into place in opposite direction and anchored with interrupted buried subcutaneous sutures.
W Plasty Text: The lesion was extirpated to the level of the fat with a #15 scalpel blade.  Given the location of the defect, shape of the defect and the proximity to free margins a W-plasty was deemed most appropriate for repair.  Using a sterile surgical marker, the appropriate transposition arms of the W-plasty were drawn incorporating the defect and placing the expected incisions within the relaxed skin tension lines where possible.    The area thus outlined was incised deep to adipose tissue with a #15 scalpel blade.  The skin margins were undermined to an appropriate distance in all directions utilizing iris scissors.  The opposing transposition arms were then transposed into place in opposite direction and anchored with interrupted buried subcutaneous sutures.
Z Plasty Text: The lesion was extirpated to the level of the fat with a #15 scalpel blade.  Given the location of the defect, shape of the defect and the proximity to free margins a Z-plasty was deemed most appropriate for repair.  Using a sterile surgical marker, the appropriate transposition arms of the Z-plasty were drawn incorporating the defect and placing the expected incisions within the relaxed skin tension lines where possible.    The area thus outlined was incised deep to adipose tissue with a #15 scalpel blade.  The skin margins were undermined to an appropriate distance in all directions utilizing iris scissors.  The opposing transposition arms were then transposed into place in opposite direction and anchored with interrupted buried subcutaneous sutures.
Zygomaticofacial Flap Text: Given the location of the defect, shape of the defect and the proximity to free margins a zygomaticofacial flap was deemed most appropriate for repair. Using a sterile surgical marker, the appropriate flap was drawn incorporating the defect and placing the expected incisions within the relaxed skin tension lines where possible. The area thus outlined was incised deep to adipose tissue with a #15 scalpel blade with preservation of a vascular pedicle.  The skin margins were undermined to an appropriate distance in all directions utilizing iris scissors.  The flap was then placed into the defect and anchored with interrupted buried subcutaneous sutures.
Cheek Interpolation Flap Text: A decision was made to reconstruct the defect utilizing an interpolation axial flap and a staged reconstruction.  A telfa template was made of the defect.  This telfa template was then used to outline the Cheek Interpolation flap.  The donor area for the pedicle flap was then injected with anesthesia.  The flap was excised through the skin and subcutaneous tissue down to the layer of the underlying musculature.  The interpolation flap was carefully excised within this deep plane to maintain its blood supply.  The edges of the donor site were undermined.   The donor site was closed in a primary fashion.  The pedicle was then rotated into position and sutured.  Once the tube was sutured into place, adequate blood supply was confirmed with blanching and refill.  The pedicle was then wrapped with xeroform gauze and dressed appropriately with a telfa and gauze bandage to ensure continued blood supply and protect the attached pedicle.
Cheek-To-Nose Interpolation Flap Text: A decision was made to reconstruct the defect utilizing an interpolation axial flap and a staged reconstruction.  A telfa template was made of the defect.  This telfa template was then used to outline the Cheek-To-Nose Interpolation flap.  The donor area for the pedicle flap was then injected with anesthesia.  The flap was excised through the skin and subcutaneous tissue down to the layer of the underlying musculature.  The interpolation flap was carefully excised within this deep plane to maintain its blood supply.  The edges of the donor site were undermined.   The donor site was closed in a primary fashion.  The pedicle was then rotated into position and sutured.  Once the tube was sutured into place, adequate blood supply was confirmed with blanching and refill.  The pedicle was then wrapped with xeroform gauze and dressed appropriately with a telfa and gauze bandage to ensure continued blood supply and protect the attached pedicle.
Interpolation Flap Text: A decision was made to reconstruct the defect utilizing an interpolation axial flap and a staged reconstruction.  A telfa template was made of the defect.  This telfa template was then used to outline the interpolation flap.  The donor area for the pedicle flap was then injected with anesthesia.  The flap was excised through the skin and subcutaneous tissue down to the layer of the underlying musculature.  The interpolation flap was carefully excised within this deep plane to maintain its blood supply.  The edges of the donor site were undermined.   The donor site was closed in a primary fashion.  The pedicle was then rotated into position and sutured.  Once the tube was sutured into place, adequate blood supply was confirmed with blanching and refill.  The pedicle was then wrapped with xeroform gauze and dressed appropriately with a telfa and gauze bandage to ensure continued blood supply and protect the attached pedicle.
Melolabial Interpolation Flap Text: A decision was made to reconstruct the defect utilizing an interpolation axial flap and a staged reconstruction.  A telfa template was made of the defect.  This telfa template was then used to outline the melolabial interpolation flap.  The donor area for the pedicle flap was then injected with anesthesia.  The flap was excised through the skin and subcutaneous tissue down to the layer of the underlying musculature.  The pedicle flap was carefully excised within this deep plane to maintain its blood supply.  The edges of the donor site were undermined.   The donor site was closed in a primary fashion.  The pedicle was then rotated into position and sutured.  Once the tube was sutured into place, adequate blood supply was confirmed with blanching and refill.  The pedicle was then wrapped with xeroform gauze and dressed appropriately with a telfa and gauze bandage to ensure continued blood supply and protect the attached pedicle.
Mastoid Interpolation Flap Text: A decision was made to reconstruct the defect utilizing an interpolation axial flap and a staged reconstruction.  A telfa template was made of the defect.  This telfa template was then used to outline the mastoid interpolation flap.  The donor area for the pedicle flap was then injected with anesthesia.  The flap was excised through the skin and subcutaneous tissue down to the layer of the underlying musculature.  The pedicle flap was carefully excised within this deep plane to maintain its blood supply.  The edges of the donor site were undermined.   The donor site was closed in a primary fashion.  The pedicle was then rotated into position and sutured.  Once the tube was sutured into place, adequate blood supply was confirmed with blanching and refill.  The pedicle was then wrapped with xeroform gauze and dressed appropriately with a telfa and gauze bandage to ensure continued blood supply and protect the attached pedicle.
Posterior Auricular Interpolation Flap Text: A decision was made to reconstruct the defect utilizing an interpolation axial flap and a staged reconstruction.  A telfa template was made of the defect.  This telfa template was then used to outline the posterior auricular interpolation flap.  The donor area for the pedicle flap was then injected with anesthesia.  The flap was excised through the skin and subcutaneous tissue down to the layer of the underlying musculature.  The pedicle flap was carefully excised within this deep plane to maintain its blood supply.  The edges of the donor site were undermined.   The donor site was closed in a primary fashion.  The pedicle was then rotated into position and sutured.  Once the tube was sutured into place, adequate blood supply was confirmed with blanching and refill.  The pedicle was then wrapped with xeroform gauze and dressed appropriately with a telfa and gauze bandage to ensure continued blood supply and protect the attached pedicle.
Paramedian Forehead Flap Text: A decision was made to reconstruct the defect utilizing an interpolation axial flap and a staged reconstruction.  A telfa template was made of the defect.  This telfa template was then used to outline the paramedian forehead pedicle flap.  The donor area for the pedicle flap was then injected with anesthesia.  The flap was excised through the skin and subcutaneous tissue down to the layer of the underlying musculature.  The pedicle flap was carefully excised within this deep plane to maintain its blood supply.  The edges of the donor site were undermined.   The donor site was closed in a primary fashion.  The pedicle was then rotated into position and sutured.  Once the tube was sutured into place, adequate blood supply was confirmed with blanching and refill.  The pedicle was then wrapped with xeroform gauze and dressed appropriately with a telfa and gauze bandage to ensure continued blood supply and protect the attached pedicle.
Abbe Flap (Upper To Lower Lip) Text: The defect of the lower lip was assessed and measured.  Given the location and size of the defect, an Abbe flap was deemed most appropriate. Using a sterile surgical marker, an appropriate Abbe flap was measured and drawn on the upper lip. Local anesthesia was then infiltrated.  A scalpel was then used to incise the upper lip through and through the skin, vermilion, muscle and mucosa, leaving the flap pedicled on the opposite side.  The flap was then rotated and transferred to the lower lip defect.  The flap was then sutured into place with a three layer technique, closing the orbicularis oris muscle layer with subcutaneous buried sutures, followed by a mucosal layer and an epidermal layer.
Abbe Flap (Lower To Upper Lip) Text: The defect of the upper lip was assessed and measured.  Given the location and size of the defect, an Abbe flap was deemed most appropriate. Using a sterile surgical marker, an appropriate Abbe flap was measured and drawn on the lower lip. Local anesthesia was then infiltrated. A scalpel was then used to incise the upper lip through and through the skin, vermilion, muscle and mucosa, leaving the flap pedicled on the opposite side.  The flap was then rotated and transferred to the lower lip defect.  The flap was then sutured into place with a three layer technique, closing the orbicularis oris muscle layer with subcutaneous buried sutures, followed by a mucosal layer and an epidermal layer.
Estlander Flap (Upper To Lower Lip) Text: The defect of the lower lip was assessed and measured.  Given the location and size of the defect, an Estlander flap was deemed most appropriate. Using a sterile surgical marker, an appropriate Estlander flap was measured and drawn on the upper lip. Local anesthesia was then infiltrated. A scalpel was then used to incise the lateral aspect of the flap, through skin, muscle and mucosa, leaving the flap pedicled medially.  The flap was then rotated and positioned to fill the lower lip defect.  The flap was then sutured into place with a three layer technique, closing the orbicularis oris muscle layer with subcutaneous buried sutures, followed by a mucosal layer and an epidermal layer.
Cheiloplasty (Less Than 50%) Text: A decision was made to reconstruct the defect with a  cheiloplasty.  The defect was undermined extensively.  Additional obicularis oris muscle was excised with a 15 blade scalpel.  The defect was converted into a full thickness wedge, of less than 50% of the vertical height of the lip, to facilite a better cosmetic result.  Small vessels were then tied off with 5-0 monocyrl. The obicularis oris, superficial fascia, adipose and dermis were then reapproximated.  After the deeper layers were approximated the epidermis was reapproximated with particular care given to realign the vermilion border.
Cheiloplasty (Complex) Text: A decision was made to reconstruct the defect with a  cheiloplasty.  The defect was undermined extensively.  Additional obicularis oris muscle was excised with a 15 blade scalpel.  The defect was converted into a full thickness wedge to facilite a better cosmetic result.  Small vessels were then tied off with 5-0 monocyrl. The obicularis oris, superficial fascia, adipose and dermis were then reapproximated.  After the deeper layers were approximated the epidermis was reapproximated with particular care given to realign the vermilion border.
Ear Wedge Repair Text: A wedge excision was completed by carrying down an excision through the full thickness of the ear and cartilage with an inward facing Burow's triangle. The wound was then closed in a layered fashion.
Full Thickness Lip Wedge Repair (Flap) Text: Given the location of the defect and the proximity to free margins a full thickness wedge repair was deemed most appropriate.  Using a sterile surgical marker, the appropriate repair was drawn incorporating the defect and placing the expected incisions perpendicular to the vermilion border.  The vermilion border was also meticulously outlined to ensure appropriate reapproximation during the repair.  The area thus outlined was incised through and through with a #15 scalpel blade.  The muscularis and dermis were reaproximated with deep sutures following hemostasis. Care was taken to realign the vermilion border before proceeding with the superficial closure.  Once the vermilion was realigned the superfical and mucosal closure was finished.
Ftsg Text: The defect edges were debeveled with a #15 scalpel blade.  Given the location of the defect, shape of the defect and the proximity to free margins a full thickness skin graft was deemed most appropriate.  Using a sterile surgical marker, the primary defect shape was transferred to the donor site. The area thus outlined was incised deep to adipose tissue with a #15 scalpel blade.  The harvested graft was then trimmed of adipose tissue until only dermis and epidermis was left.  The skin margins of the secondary defect were undermined to an appropriate distance in all directions utilizing iris scissors.  The secondary defect was closed with interrupted buried subcutaneous sutures.  The skin edges were then re-apposed with running  sutures.  The skin graft was then placed in the primary defect and oriented appropriately.
Split-Thickness Skin Graft Text: The defect edges were debeveled with a #15 scalpel blade.  Given the location of the defect, shape of the defect and the proximity to free margins a split thickness skin graft was deemed most appropriate.  Using a sterile surgical marker, the primary defect shape was transferred to the donor site. The split thickness graft was then harvested.  The skin graft was then placed in the primary defect and oriented appropriately.
Burow's Graft Text: The defect edges were debeveled with a #15 scalpel blade. Given the location of the defect, shape of the defect, the proximity to free margins and the presence of a standing cone deformity a Burow's skin graft was deemed most appropriate. The standing cone was removed and this tissue was then trimmed to the shape of the primary defect. The adipose tissue was also removed until only dermis and epidermis were left.  The skin margins of the secondary defect were undermined to an appropriate distance in all directions utilizing iris scissors.  The secondary defect was closed with interrupted buried subcutaneous sutures.  The skin edges were then re-apposed with running  sutures.  The skin graft was then placed in the primary defect and oriented appropriately.
Cartilage Graft Text: The defect edges were debeveled with a #15 scalpel blade.  Given the location of the defect, shape of the defect, the fact the defect involved a full thickness cartilage defect a cartilage graft was deemed most appropriate.  An appropriate donor site was identified, cleansed, and anesthetized. The cartilage graft was then harvested and transferred to the recipient site, oriented appropriately and then sutured into place.  The secondary defect was then repaired using a primary closure.
Composite Graft Text: The defect edges were debeveled with a #15 scalpel blade.  Given the location of the defect, shape of the defect, the proximity to free margins and the fact the defect was full thickness a composite graft was deemed most appropriate.  The defect was outline and then transferred to the donor site.  A full thickness graft was then excised from the donor site. The graft was then placed in the primary defect, oriented appropriately and then sutured into place.  The secondary defect was then repaired using a primary closure.
Epidermal Autograft Text: The defect edges were debeveled with a #15 scalpel blade.  Given the location of the defect, shape of the defect and the proximity to free margins an epidermal autograft was deemed most appropriate.  Using a sterile surgical marker, the primary defect shape was transferred to the donor site. The epidermal graft was then harvested.  The skin graft was then placed in the primary defect and oriented appropriately.
Dermal Autograft Text: The defect edges were debeveled with a #15 scalpel blade.  Given the location of the defect, shape of the defect and the proximity to free margins a dermal autograft was deemed most appropriate.  Using a sterile surgical marker, the primary defect shape was transferred to the donor site. The area thus outlined was incised deep to adipose tissue with a #15 scalpel blade.  The harvested graft was then trimmed of adipose and epidermal tissue until only dermis was left.  The skin graft was then placed in the primary defect and oriented appropriately.
Skin Substitute Text: The defect edges were debeveled with a #15 scalpel blade.  Given the location of the defect, shape of the defect and the proximity to free margins a skin substitute graft was deemed most appropriate.  The graft material was trimmed to fit the size of the defect. The graft was then placed in the primary defect and oriented appropriately.
Tissue Cultured Epidermal Autograft Text: The defect edges were debeveled with a #15 scalpel blade.  Given the location of the defect, shape of the defect and the proximity to free margins a tissue cultured epidermal autograft was deemed most appropriate.  The graft was then trimmed to fit the size of the defect.  The graft was then placed in the primary defect and oriented appropriately.
Xenograft Text: The defect edges were debeveled with a #15 scalpel blade.  Given the location of the defect, shape of the defect and the proximity to free margins a xenograft was deemed most appropriate.  The graft was then trimmed to fit the size of the defect.  The graft was then placed in the primary defect and oriented appropriately.
Purse String (Simple) Text: Given the location of the defect and the characteristics of the surrounding skin a purse string closure was deemed most appropriate.  Undermining was performed circumfirentially around the surgical defect.  A purse string suture was then placed and tightened.
Purse String (Intermediate) Text: Given the location of the defect and the characteristics of the surrounding skin a purse string intermediate closure was deemed most appropriate.  Undermining was performed circumfirentially around the surgical defect.  A purse string suture was then placed and tightened.
Partial Purse String (Simple) Text: Given the location of the defect and the characteristics of the surrounding skin a simple purse string closure was deemed most appropriate.  Undermining was performed circumfirentially around the surgical defect.  A purse string suture was then placed and tightened. Wound tension only allowed a partial closure of the circular defect.
Partial Purse String (Intermediate) Text: Given the location of the defect and the characteristics of the surrounding skin an intermediate purse string closure was deemed most appropriate.  Undermining was performed circumfirentially around the surgical defect.  A purse string suture was then placed and tightened. Wound tension only allowed a partial closure of the circular defect.
Localized Dermabrasion With Wire Brush Text: The patient was draped in routine manner.  Localized dermabrasion using 3 x 17 mm wire brush was performed in routine manner to papillary dermis. This spot dermabrasion is being performed to complete skin cancer reconstruction. It also will eliminate the other sun damaged precancerous cells that are known to be part of the regional effect of a lifetime's worth of sun exposure. This localized dermabrasion is therapeutic and should not be considered cosmetic in any regard.
Tarsorrhaphy Text: A tarsorrhaphy was performed using Frost sutures.
Complex Repair And Flap Additional Text (Will Appearing After The Standard Complex Repair Text): The complex repair was not sufficient to completely close the primary defect. The remaining additional defect was repaired with the flap mentioned below.
Complex Repair And Graft Additional Text (Will Appearing After The Standard Complex Repair Text): The complex repair was not sufficient to completely close the primary defect. The remaining additional defect was repaired with the graft mentioned below.
Manual Repair Warning Statement: We plan on removing the manually selected variable below in favor of our much easier automatic structured text blocks found in the previous tab. We decided to do this to help make the flow better and give you the full power of structured data. Manual selection is never going to be ideal in our platform and I would encourage you to avoid using manual selection from this point on, especially since I will be sunsetting this feature. It is important that you do one of two things with the customized text below. First, you can save all of the text in a word file so you can have it for future reference. Second, transfer the text to the appropriate area in the Library tab. Lastly, if there is a flap or graft type which we do not have you need to let us know right away so I can add it in before the variable is hidden. No need to panic, we plan to give you roughly 6 months to make the change.
Same Histology In Subsequent Stages Text: The pattern and morphology of the tumor is as described in the first stage.
No Residual Tumor Seen Histology Text: There were no malignant cells seen in the sections examined.
Inflammation Suggestive Of Cancer Camouflage Histology Text: There was a dense lymphocytic infiltrate which prevented adequate histologic evaluation of adjacent structures.
Bcc Histology Text: There were numerous aggregates of basaloid cells.
Bcc Infiltrative Histology Text: There were numerous aggregates of basaloid cells demonstrating an infiltrative pattern.
Mart-1 - Positive Histology Text: MART-1 staining demonstrates areas of higher density and clustering of melanocytes with Pagetoid spread upwards within the epidermis. The surgical margins are positive for tumor cells.
Mart-1 - Negative Histology Text: MART-1 staining demonstrates a normal density and pattern of melanocytes along the dermal-epidermal junction. The surgical margins are negative for tumor cells.
Information: Selecting Yes will display possible errors in your note based on the variables you have selected. This validation is only offered as a suggestion for you. PLEASE NOTE THAT THE VALIDATION TEXT WILL BE REMOVED WHEN YOU FINALIZE YOUR NOTE. IF YOU WANT TO FAX A PRELIMINARY NOTE YOU WILL NEED TO TOGGLE THIS TO 'NO' IF YOU DO NOT WANT IT IN YOUR FAXED NOTE.

## 2023-02-20 ENCOUNTER — NON-PROVIDER VISIT (OUTPATIENT)
Dept: CARDIOLOGY | Facility: MEDICAL CENTER | Age: 73
End: 2023-02-20
Payer: MEDICARE

## 2023-02-20 PROCEDURE — 93294 REM INTERROG EVL PM/LDLS PM: CPT | Performed by: INTERNAL MEDICINE

## 2023-02-21 NOTE — CARDIAC REMOTE MONITOR - SCAN
Device transmission reviewed. Device demonstrated appropriate function.       Electronically Signed by: Grisel Escalante M.D.    3/6/2023  8:34 AM

## 2023-02-24 ENCOUNTER — APPOINTMENT (RX ONLY)
Dept: URBAN - METROPOLITAN AREA CLINIC 22 | Facility: CLINIC | Age: 73
Setting detail: DERMATOLOGY
End: 2023-02-24

## 2023-02-24 DIAGNOSIS — Z48.817 ENCOUNTER FOR SURGICAL AFTERCARE FOLLOWING SURGERY ON THE SKIN AND SUBCUTANEOUS TISSUE: ICD-10-CM

## 2023-02-24 PROCEDURE — ? POST-OP WOUND EVALUATION

## 2023-02-24 PROCEDURE — 99024 POSTOP FOLLOW-UP VISIT: CPT

## 2023-02-24 ASSESSMENT — LOCATION SIMPLE DESCRIPTION DERM: LOCATION SIMPLE: LEFT CHEEK

## 2023-02-24 ASSESSMENT — LOCATION ZONE DERM: LOCATION ZONE: FACE

## 2023-02-24 ASSESSMENT — LOCATION DETAILED DESCRIPTION DERM: LOCATION DETAILED: LEFT INFERIOR LATERAL MALAR CHEEK

## 2023-02-24 NOTE — PROCEDURE: POST-OP WOUND EVALUATION
Detail Level: Detailed
Add 23811 Cpt? (Important Note: In 2017 The Use Of 27398 Is Being Tracked By Cms To Determine Future Global Period Reimbursement For Global Periods): yes
Wound Evaluated By (Optional): Raul Lugo MD
Wound Diameter In Cm(Optional): 0
Wound Crusting?: clean
Sutures?: intact
Wound Edema?: mild
Wound Color?: pink
Any New Or Residual Neoplasm?: No

## 2023-03-03 ENCOUNTER — HOSPITAL ENCOUNTER (OUTPATIENT)
Dept: LAB | Facility: MEDICAL CENTER | Age: 73
End: 2023-03-03
Attending: INTERNAL MEDICINE
Payer: MEDICARE

## 2023-03-03 ENCOUNTER — OFFICE VISIT (OUTPATIENT)
Dept: CARDIOLOGY | Facility: MEDICAL CENTER | Age: 73
End: 2023-03-03
Payer: MEDICARE

## 2023-03-03 VITALS
WEIGHT: 220.6 LBS | DIASTOLIC BLOOD PRESSURE: 66 MMHG | SYSTOLIC BLOOD PRESSURE: 128 MMHG | OXYGEN SATURATION: 96 % | HEIGHT: 70 IN | RESPIRATION RATE: 16 BRPM | HEART RATE: 95 BPM | BODY MASS INDEX: 31.58 KG/M2

## 2023-03-03 DIAGNOSIS — Z95.1 S/P CABG (CORONARY ARTERY BYPASS GRAFT): ICD-10-CM

## 2023-03-03 DIAGNOSIS — I25.10 CORONARY ARTERY DISEASE, OCCLUSIVE: ICD-10-CM

## 2023-03-03 DIAGNOSIS — Z95.0 CARDIAC PACEMAKER IN SITU: ICD-10-CM

## 2023-03-03 DIAGNOSIS — I50.31 ACUTE HEART FAILURE WITH PRESERVED EJECTION FRACTION (HCC): ICD-10-CM

## 2023-03-03 DIAGNOSIS — N18.9 CHRONIC KIDNEY DISEASE, UNSPECIFIED CKD STAGE: ICD-10-CM

## 2023-03-03 DIAGNOSIS — Z98.890 S/P TRICUSPID VALVE REPAIR: ICD-10-CM

## 2023-03-03 DIAGNOSIS — Z98.890 S/P MVR (MITRAL VALVE REPAIR): ICD-10-CM

## 2023-03-03 DIAGNOSIS — I48.20 ATRIAL FIBRILLATION, CHRONIC (HCC): ICD-10-CM

## 2023-03-03 LAB
ALBUMIN SERPL BCP-MCNC: 4.2 G/DL (ref 3.2–4.9)
ALBUMIN/GLOB SERPL: 1.4 G/DL
ALP SERPL-CCNC: 162 U/L (ref 30–99)
ALT SERPL-CCNC: 5 U/L (ref 2–50)
ANION GAP SERPL CALC-SCNC: 12 MMOL/L (ref 7–16)
AST SERPL-CCNC: 8 U/L (ref 12–45)
BILIRUB SERPL-MCNC: 1.5 MG/DL (ref 0.1–1.5)
BUN SERPL-MCNC: 46 MG/DL (ref 8–22)
CALCIUM ALBUM COR SERPL-MCNC: 9.8 MG/DL (ref 8.5–10.5)
CALCIUM SERPL-MCNC: 10 MG/DL (ref 8.5–10.5)
CHLORIDE SERPL-SCNC: 103 MMOL/L (ref 96–112)
CO2 SERPL-SCNC: 27 MMOL/L (ref 20–33)
CREAT SERPL-MCNC: 2.71 MG/DL (ref 0.5–1.4)
EKG IMPRESSION: NORMAL
ERYTHROCYTE [DISTWIDTH] IN BLOOD BY AUTOMATED COUNT: 66.1 FL (ref 35.9–50)
GFR SERPLBLD CREATININE-BSD FMLA CKD-EPI: 24 ML/MIN/1.73 M 2
GLOBULIN SER CALC-MCNC: 3.1 G/DL (ref 1.9–3.5)
GLUCOSE SERPL-MCNC: 91 MG/DL (ref 65–99)
HCT VFR BLD AUTO: 41.5 % (ref 42–52)
HGB BLD-MCNC: 12.9 G/DL (ref 14–18)
MAGNESIUM SERPL-MCNC: 2.3 MG/DL (ref 1.5–2.5)
MCH RBC QN AUTO: 27.1 PG (ref 27–33)
MCHC RBC AUTO-ENTMCNC: 31.1 G/DL (ref 33.7–35.3)
MCV RBC AUTO: 87.2 FL (ref 81.4–97.8)
PLATELET # BLD AUTO: 272 K/UL (ref 164–446)
PMV BLD AUTO: 10 FL (ref 9–12.9)
POTASSIUM SERPL-SCNC: 4.2 MMOL/L (ref 3.6–5.5)
PROT SERPL-MCNC: 7.3 G/DL (ref 6–8.2)
RBC # BLD AUTO: 4.76 M/UL (ref 4.7–6.1)
SODIUM SERPL-SCNC: 142 MMOL/L (ref 135–145)
WBC # BLD AUTO: 9 K/UL (ref 4.8–10.8)

## 2023-03-03 PROCEDURE — 85027 COMPLETE CBC AUTOMATED: CPT

## 2023-03-03 PROCEDURE — 36415 COLL VENOUS BLD VENIPUNCTURE: CPT

## 2023-03-03 PROCEDURE — 83735 ASSAY OF MAGNESIUM: CPT

## 2023-03-03 PROCEDURE — 93000 ELECTROCARDIOGRAM COMPLETE: CPT | Performed by: INTERNAL MEDICINE

## 2023-03-03 PROCEDURE — 80053 COMPREHEN METABOLIC PANEL: CPT

## 2023-03-03 PROCEDURE — 99215 OFFICE O/P EST HI 40 MIN: CPT | Performed by: INTERNAL MEDICINE

## 2023-03-03 ASSESSMENT — ENCOUNTER SYMPTOMS
PALPITATIONS: 0
BRUISES/BLEEDS EASILY: 0
COUGH: 0
SHORTNESS OF BREATH: 1
MYALGIAS: 0
LOSS OF CONSCIOUSNESS: 0
DIZZINESS: 0

## 2023-03-03 ASSESSMENT — FIBROSIS 4 INDEX: FIB4 SCORE: 0.84

## 2023-03-03 NOTE — PROGRESS NOTES
Chief Complaint   Patient presents with    Congestive Heart Failure     F/V Dx: ACC/AHA stage C heart failure with preserved ejection fraction (HCC)    Atrial Fibrillation     F/v Dx: Atrial fibrillation, chronic (HCC)       Subjective     Zander Stewart is a 73 y.o. male who presents today for follow-up follow-up cardiac care.    The patient has chronic atrial fibrillation, HFpEF 45-50%, S/P MV repair (32 mm ring), TV repair (30 mm ring), CABG LIMA-LAD, Maze procedure and JANINE excision 3/25/2022, OAC on warfarin, PPM 4/1/2022, HTN, HLD, HOWARD on CPAP, CKD, chronic anticoagulation, hypertension and hyperlipidemia with a history of CKD, polycystic kidney disease, obstructive sleep apnea with intolerance to CPAP therapy.    Presents with his wife today    Not seen since 7/13/2022 appointment when the patient  was referred to EP but did not follow-up (See 7/28/2020 Mychart note) and canceled general cardiology 3-month follow-up. He presents today with chronic progressive symptoms of SIM, exercise fatigue, LE edema and increasing abdominal girth with weight gain, poor appetite.  Remains compliant with his current medications which includes torsemide 40 mg bid and occasional metolazone 2.5 mg with some diuretic response.    Since 2/15/2022 appointment the patient underwent MV repair (32 mm ring), TV repair (30 mm ring), CABG LIMA-LAD, Maze procedure and JANINE excision 3/25/2022 [facility] [place], [place] subsequent PPM 4/1/2022.  Here for follow-up.  Discharged on aspirin, metoprolol, furosemide 40 mg bid over the past 2 weeks has had progressive bilateral LE edema with abdominal swelling, some orthopnea and generalized fatigue and exercise intolerance with some shortness of breath.  Sleeping between his bed and in his recliner.  Has not followed up with nephrology.  Taking furosemide 40 mg twice daily.    H/O HOWARD diagnosed 20 years ago probably with [facility] but did not tolerate CPAP.   Lost to follow-up.     Previously followed by Dr. Drake Westbrook, electrophysiologist and Dr. Scot Hernández, retired cardiologist    Past Medical History:   Diagnosis Date    Arthritis 04/23/2021    Osteo- Fingers/Hands    Atrial fibrillation (HCC)     Atrial flutter (HCC)     Breath shortness 04/23/2021    In the past, is a current problem frequently    Chickenpox     Chronic anticoagulation     Heart burn 04/23/2021    GERD    Polycystic kidney, unspecified type     Pure hypercholesterolemia     Sleep apnea     on oxygen, unable to tolerate CPAP.    Unspecified essential hypertension     Unspecified sleep apnea      Past Surgical History:   Procedure Laterality Date    TONSILLECTOMY  1982    APPENDECTOMY  1966    APPENDECTOMY      OTHER      Cardiac Cath    OTHER      Kidney Surgery    OTHER      Nose Surgery    OTHER      Tonsillectomy with Adenoidectomy     Family History   Problem Relation Age of Onset    Heart Disease Neg Hx      Social History     Socioeconomic History    Marital status:      Spouse name: Not on file    Number of children: Not on file    Years of education: Not on file    Highest education level: Not on file   Occupational History    Not on file   Tobacco Use    Smoking status: Every Day     Packs/day: 1.00     Types: Cigars, Cigarettes    Smokeless tobacco: Never   Vaping Use    Vaping Use: Never used   Substance and Sexual Activity    Alcohol use: Yes     Comment: rarely    Drug use: No    Sexual activity: Not on file   Other Topics Concern    Not on file   Social History Narrative    Not on file     Social Determinants of Health     Financial Resource Strain: Not on file   Food Insecurity: Not on file   Transportation Needs: Not on file   Physical Activity: Not on file   Stress: Not on file   Social Connections: Not on file   Intimate Partner Violence: Not on file   Housing Stability: Not on file     No Known Allergies  Outpatient Encounter Medications as of 3/3/2023   Medication Sig  "Dispense Refill    torsemide (DEMADEX) 20 MG Tab Take 40 mg by mouth 2 times a day.      metOLazone (ZAROXOLYN) 2.5 MG Tab Take 1 Tablet by mouth every day. 30 Tablet 11    metoprolol tartrate (LOPRESSOR) 25 MG Tab Take 12.5 mg by mouth 2 times a day.      aspirin 81 MG EC tablet Take 81 mg by mouth every day.      potassium chloride ER (KLOR-CON) 10 MEQ tablet Take 10 mEq by mouth every 48 hours.      omeprazole (PRILOSEC) 20 MG CPDR Take 20 mg by mouth 2 times a day.      zolpidem (AMBIEN) 10 MG TABS Take 10 mg by mouth at bedtime as needed.      losartan (COZAAR) 25 MG Tab Take 1 Tablet by mouth every day. (Patient not taking: Reported on 3/3/2023) 90 Tablet 3     No facility-administered encounter medications on file as of 3/3/2023.     Review of Systems   Constitutional:  Positive for malaise/fatigue.   Respiratory:  Positive for shortness of breath. Negative for cough.    Cardiovascular:  Positive for leg swelling. Negative for chest pain and palpitations.   Musculoskeletal:  Negative for myalgias.   Neurological:  Negative for dizziness and loss of consciousness.   Endo/Heme/Allergies:  Does not bruise/bleed easily.            Objective     /66 (BP Location: Left arm, Patient Position: Sitting, BP Cuff Size: Adult)   Pulse 95   Resp 16   Ht 1.778 m (5' 10\")   Wt 100 kg (220 lb 9.6 oz)   SpO2 96%   BMI 31.65 kg/m²   BP Readings from Last 5 Encounters:   03/03/23 128/66   07/20/22 (!) 140/80   07/13/22 108/70   07/06/22 (!) 160/104   06/24/22 (!) 132/90      Pulse Readings from Last 5 Encounters:   03/03/23 95   07/20/22 (!) 110   07/13/22 (!) 104   07/06/22 (!) 111   06/24/22 92      Wt Readings from Last 5 Encounters:   03/03/23 100 kg (220 lb 9.6 oz)   07/20/22 96.2 kg (212 lb)   07/13/22 99.8 kg (220 lb)   07/06/22 107 kg (235 lb)   06/24/22 108 kg (237 lb)       Physical Exam  Vitals reviewed.   Constitutional:       General: He is not in acute distress.     Appearance: He is well-developed. "   Eyes:      Conjunctiva/sclera: Conjunctivae normal.      Pupils: Pupils are equal, round, and reactive to light.   Neck:      Vascular: No JVD.      Comments: Marked elevated jugular venous pressure at 90 degrees with prominent venous pulsations  Cardiovascular:      Rate and Rhythm: Normal rate. Rhythm irregular.      Pulses:           Carotid pulses are 2+ on the right side and 2+ on the left side.     Heart sounds: Murmur heard.     No friction rub. No gallop.   Pulmonary:      Effort: Pulmonary effort is normal. No accessory muscle usage or respiratory distress.      Breath sounds: Rales present. No wheezing.   Abdominal:      Palpations: Abdomen is soft.      Comments: Ascites   Musculoskeletal:      Cervical back: Normal range of motion and neck supple.      Right lower leg: Edema present.      Left lower leg: Edema present.      Comments:     Skin:     General: Skin is warm and dry.      Findings: No rash.      Nails: There is no clubbing.   Neurological:      Mental Status: He is alert and oriented to person, place, and time.   Psychiatric:         Behavior: Behavior normal.          CARDIAC CATHETERIZATION 7/17/1997.  1.  Left ventricular ejection fraction 64%.  2.  Normal coronary arteries.    ECHOCARDIOGRAM 06/06/2013  Left ejection fraction 55-60%.  Mild concentric left ventricular hypertrophy.  Mild mitral regurgitation.  Severe left atrial dilatation.  Moderate right atrial dilatation.     ECHOCARDIOGRAM 09/02/2020  No prior study is available for comparison.   Normal left ventricular chamber size.  Mildly reduced left ventricular systolic function.  Left ventricular ejection fraction is visually estimated to be 45-50%.  Aortic sclerosis without stenosis.  Mild mitral regurgitation.  Normal right ventricular size and systolic function.  Right heart pressures are normal.  Severely dilated left atrium.  Rythym is atrial fibrillation.     TRANSESOPHAGEAL ECHOCARDIOGRAM 4/27/2021.  Mild to moderately  reduced left ventricular systolic function.  Left ventricular ejection fraction is visually estimated to be 40-45%.  Mild aortic insufficiency.  Mild degenerative mitral valve leaftets.  Moderate mitral regurgitation, central with 2 jets.    ECHOCARDIOGRAM 12/8/2021  Normal left ventricular chamber size.  Mildly reduced left ventricular systolic function.  The left ventricular ejection fraction is visually estimated to be 50%;   variable due to atrial fibrillation.  Mild concentric left ventricular hypertrophy.  Aortic sclerosis without stenosis.  Mild aortic insufficiency.  Moderate mitral regurgitation; possibly more severe.  Estimated right ventricular systolic pressure is 65 mmHg.  Dilated right ventricle with reduced right ventricular systolic   function.  Severe biatrial enlargement.  Rhythm is atrial fibrillation.  Compared to prior echo on 12/08/2020; no significant change.       EKG 04/25/2013 atrial fibrillation, rate 83.    EKG 7/6/2022 atrial fibrillation, rate 106, personally interpreted.    EKG 3/3/2023 atrial fibrillation, rate 90s, LAFB, incomplete RBBB, personally interpreted    Assessment & Plan     1. Atrial fibrillation, chronic (HCC)  EKG    EC-ECHOCARDIOGRAM COMPLETE W/O CONT    TSH+FREE T4    REFERRAL TO CARDIOLOGY      2. Acute heart failure with preserved ejection fraction (HCC)  EC-ECHOCARDIOGRAM COMPLETE W/O CONT    Comp Metabolic Panel    CBC WITHOUT DIFFERENTIAL    MAGNESIUM    REFERRAL TO CARDIOLOGY      3. S/P CABG (coronary artery bypass graft)        4. S/P MVR (mitral valve repair)        5. S/P tricuspid valve repair        6. Cardiac pacemaker in situ        7. Coronary artery disease, occlusive        8. Chronic kidney disease, unspecified CKD stage            Medical Decision Making: Today's Assessment/Status/Plan:   Assessment  Atrial fibrillation, chronic  HFpEF, EF 45-50%.  S/P MV repair (32 mm ring), TV repair (30 mm ring), CABG LIMA-LAD, Maze procedure and JANINE excision  3/25/2022 [facility] [place], [place]   PPM 4/1/2022 [facility]  Hypertension.  Dyslipidemia.  HOWARD on CPAP.  CKD.  Obesity.  LE edema aggravated by diltiazem.  H/O HOWARD, diagnosed 20 years ago, currently untreated.     Recommendation and recommendation  HFpEF 40-50%, currently the patient has significant decompensated biventricular heart failure, I discussed the significance of this finding and recommended hospitalization for IV diuresis but the patient declined, will have him increase torsemide to 60 mg bid, metolazone 2.5 mg qod starting today, KCl 20 M EQ daily, stat CMP, echocardiogram ordered.  Atrial fibrillation, permanent, post Maze, EKG today shows atrial fibrillation rate 95 with LAFB and IRBBB, continue metoprolol, on no anticoagulation after JANINE excision, again will refer back to EP for rhythm control recommendations.  S/P MV repair, TR repair, CABG LIMA-LAD, JANINE excision, Maze, echocardiogram pending  CAD/CABG, clinically stable, continue aspirin, atorvastatin, metoprolol  Hypertension,, normal, at goal, continue losartan 25 mg daily, weekly BMP  Dyslipidemia, well continue atorvastatin  CKD: Nephrology, Followed by [facility]  PPM, reviewed 12/14/2022 interrogation showing 100% mode switch, 15% V paced  RTC 3 days  The patient was instructed that in the event that he does not improve with oral diuresis or becomes more short of breath or feels worse that he should promptly go to the ER or call EMS.      010[address] exacerbation of prior problem and comorbidities, (decompensated biventricular heart failure, Afib, PPM) requiring diagnostic data (EKG), with review of data (EKG, lab, PPM interrogation), ordering tests (echocardiogram, CMP) and high risk medication monitoring with decision making, coordinating care, discussion with patient and wife regarding decompensated CHF, atrial fibrillation, CKD

## 2023-03-06 ENCOUNTER — OFFICE VISIT (OUTPATIENT)
Dept: CARDIOLOGY | Facility: MEDICAL CENTER | Age: 73
End: 2023-03-06
Payer: MEDICARE

## 2023-03-06 ENCOUNTER — HOSPITAL ENCOUNTER (OUTPATIENT)
Dept: CARDIOLOGY | Facility: MEDICAL CENTER | Age: 73
End: 2023-03-06
Attending: INTERNAL MEDICINE
Payer: MEDICARE

## 2023-03-06 VITALS
OXYGEN SATURATION: 95 % | BODY MASS INDEX: 29.56 KG/M2 | SYSTOLIC BLOOD PRESSURE: 122 MMHG | DIASTOLIC BLOOD PRESSURE: 70 MMHG | HEIGHT: 70 IN | HEART RATE: 100 BPM | RESPIRATION RATE: 16 BRPM | WEIGHT: 206.5 LBS

## 2023-03-06 DIAGNOSIS — I50.31 ACUTE HEART FAILURE WITH PRESERVED EJECTION FRACTION (HCC): ICD-10-CM

## 2023-03-06 DIAGNOSIS — I50.813 ACUTE ON CHRONIC RIGHT-SIDED HEART FAILURE (HCC): ICD-10-CM

## 2023-03-06 DIAGNOSIS — Z98.890 S/P MVR (MITRAL VALVE REPAIR): ICD-10-CM

## 2023-03-06 DIAGNOSIS — Z98.890 S/P TRICUSPID VALVE REPAIR: ICD-10-CM

## 2023-03-06 DIAGNOSIS — Z95.1 S/P CABG (CORONARY ARTERY BYPASS GRAFT): ICD-10-CM

## 2023-03-06 DIAGNOSIS — I48.20 ATRIAL FIBRILLATION, CHRONIC (HCC): ICD-10-CM

## 2023-03-06 DIAGNOSIS — I10 ESSENTIAL HYPERTENSION: Chronic | ICD-10-CM

## 2023-03-06 DIAGNOSIS — I25.10 CORONARY ARTERY DISEASE, OCCLUSIVE: ICD-10-CM

## 2023-03-06 LAB
LV EJECT FRACT  99904: 35
LV EJECT FRACT MOD 2C 99903: 24.5
LV EJECT FRACT MOD 4C 99902: 54.1
LV EJECT FRACT MOD BP 99901: 39.43

## 2023-03-06 PROCEDURE — 93306 TTE W/DOPPLER COMPLETE: CPT | Mod: 26 | Performed by: INTERNAL MEDICINE

## 2023-03-06 PROCEDURE — 93306 TTE W/DOPPLER COMPLETE: CPT

## 2023-03-06 PROCEDURE — 99214 OFFICE O/P EST MOD 30 MIN: CPT | Performed by: INTERNAL MEDICINE

## 2023-03-06 RX ORDER — METOPROLOL SUCCINATE 25 MG/1
25 TABLET, EXTENDED RELEASE ORAL DAILY
Qty: 90 TABLET | Refills: 3 | Status: SHIPPED | OUTPATIENT
Start: 2023-03-06

## 2023-03-06 RX ORDER — TORSEMIDE 20 MG/1
60 TABLET ORAL 2 TIMES DAILY
Qty: 360 TABLET | Refills: 3 | Status: SHIPPED
Start: 2023-03-06 | End: 2023-03-22 | Stop reason: DRUGHIGH

## 2023-03-06 ASSESSMENT — ENCOUNTER SYMPTOMS
LOSS OF CONSCIOUSNESS: 0
BRUISES/BLEEDS EASILY: 0
PALPITATIONS: 0
DIZZINESS: 0
SHORTNESS OF BREATH: 1
MYALGIAS: 0
COUGH: 0

## 2023-03-06 ASSESSMENT — FIBROSIS 4 INDEX: FIB4 SCORE: 0.96

## 2023-03-07 RX ORDER — TORSEMIDE 20 MG/1
TABLET ORAL
Qty: 120 TABLET | Refills: 5 | OUTPATIENT
Start: 2023-03-07

## 2023-03-07 NOTE — PROGRESS NOTES
Chief Complaint   Patient presents with    Atrial Fibrillation    Congestive Heart Failure     F/v dx: ACC/AHA stage C heart failure with reduced ejection fraction (HCC)    Coronary Artery Disease       Subjective     Zander Stewart is a 73 y.o. male who presents today for follow-up follow-up cardiac care.    The patient has chronic atrial fibrillation, HFpEF 45-50%, S/P MV repair (32 mm ring), TV repair (30 mm ring), CABG LIMA-LAD, Maze procedure and JANINE excision 3/25/2022, OAC on warfarin, PPM 4/1/2022, HTN, HLD, HOWARD on CPAP, CKD, chronic anticoagulation, hypertension and hyperlipidemia with a history of CKD, polycystic kidney disease, obstructive sleep apnea with intolerance to CPAP therapy.    Presents with his wife today    Since 3/3/2023 appointment from last week the patient is symptomatically improved.  Had increased torsemide to 60 mg bid and change metolazone to 2.5 mg qod with 14 pound weight loss.    Not seen since 7/13/2022 appointment when the patient  was referred to EP but did not follow-up (See 7/28/2020 Mychart note) and canceled general cardiology 3-month follow-up.  He presents today with chronic progressive symptoms of SIM, exercise fatigue, LE edema and increasing abdominal girth with weight gain, poor appetite.  Remains compliant with his current medications which includes torsemide 40 mg bid and occasional metolazone 2.5 mg with some diuretic response.    Since 2/15/2022 appointment the patient underwent MV repair (32 mm ring), TV repair (30 mm ring), CABG LIMA-LAD, Maze procedure and JANINE excision 3/25/2022 [facility], [place], [place] subsequent PPM 4/1/2022.  Here for follow-up.  Discharged on aspirin, metoprolol, furosemide 40 mg bid over the past 2 weeks has had progressive bilateral LE edema with abdominal swelling, some orthopnea and generalized fatigue and exercise intolerance with some shortness of breath.  Sleeping between his bed and in his recliner.  Has not followed  up with nephrology.  Taking furosemide 40 mg twice daily.    H/O HOWARD diagnosed 20 years ago probably with [facility] but did not tolerate CPAP.  Lost to follow-up.     Previously followed by Dr. Drake Westbrook, electrophysiologist and Dr. Scot Hernández, retired cardiologist    Past Medical History:   Diagnosis Date    Arthritis 04/23/2021    Osteo- Fingers/Hands    Atrial fibrillation (HCC)     Atrial flutter (HCC)     Breath shortness 04/23/2021    In the past, is a current problem frequently    Chickenpox     Chronic anticoagulation     Heart burn 04/23/2021    GERD    Polycystic kidney, unspecified type     Pure hypercholesterolemia     Sleep apnea     on oxygen, unable to tolerate CPAP.    Unspecified essential hypertension     Unspecified sleep apnea      Past Surgical History:   Procedure Laterality Date    TONSILLECTOMY  1982    APPENDECTOMY  1966    APPENDECTOMY      OTHER      Cardiac Cath    OTHER      Kidney Surgery    OTHER      Nose Surgery    OTHER      Tonsillectomy with Adenoidectomy     Family History   Problem Relation Age of Onset    Heart Disease Neg Hx      Social History     Socioeconomic History    Marital status:      Spouse name: Not on file    Number of children: Not on file    Years of education: Not on file    Highest education level: Not on file   Occupational History    Not on file   Tobacco Use    Smoking status: Every Day     Packs/day: 1.00     Types: Cigars, Cigarettes    Smokeless tobacco: Never   Vaping Use    Vaping Use: Never used   Substance and Sexual Activity    Alcohol use: Yes     Comment: rarely    Drug use: No    Sexual activity: Not on file   Other Topics Concern    Not on file   Social History Narrative    Not on file     Social Determinants of Health     Financial Resource Strain: Not on file   Food Insecurity: Not on file   Transportation Needs: Not on file   Physical Activity: Not on file   Stress: Not on file   Social Connections: Not on file  "  Intimate Partner Violence: Not on file   Housing Stability: Not on file     No Known Allergies  Outpatient Encounter Medications as of 3/6/2023   Medication Sig Dispense Refill    torsemide (DEMADEX) 20 MG Tab Take 3 Tablets by mouth 2 times a day. 360 Tablet 3    metoprolol SR (TOPROL XL) 25 MG TABLET SR 24 HR Take 1 Tablet by mouth every day. 90 Tablet 3    metOLazone (ZAROXOLYN) 2.5 MG Tab Take 1 Tablet by mouth every day. 30 Tablet 11    aspirin 81 MG EC tablet Take 81 mg by mouth every day.      potassium chloride ER (KLOR-CON) 10 MEQ tablet Take 10 mEq by mouth every 48 hours.      omeprazole (PRILOSEC) 20 MG CPDR Take 20 mg by mouth 2 times a day.      zolpidem (AMBIEN) 10 MG TABS Take 10 mg by mouth at bedtime as needed.      losartan (COZAAR) 25 MG Tab Take 1 Tablet by mouth every day. (Patient not taking: Reported on 3/3/2023) 90 Tablet 3    [DISCONTINUED] torsemide (DEMADEX) 20 MG Tab Take 40 mg by mouth 2 times a day.      [DISCONTINUED] metoprolol tartrate (LOPRESSOR) 25 MG Tab Take 12.5 mg by mouth 2 times a day.       No facility-administered encounter medications on file as of 3/6/2023.     Review of Systems   Constitutional:  Positive for malaise/fatigue.   Respiratory:  Positive for shortness of breath. Negative for cough.    Cardiovascular:  Positive for leg swelling. Negative for chest pain and palpitations.   Musculoskeletal:  Negative for myalgias.   Neurological:  Negative for dizziness and loss of consciousness.   Endo/Heme/Allergies:  Does not bruise/bleed easily.            Objective     /70 (BP Location: Left arm, Patient Position: Sitting, BP Cuff Size: Adult)   Pulse 100   Resp 16   Ht 1.778 m (5' 10\")   Wt 93.7 kg (206 lb 8 oz)   SpO2 95%   BMI 29.63 kg/m²   BP Readings from Last 5 Encounters:   03/06/23 122/70   03/03/23 128/66   07/20/22 (!) 140/80   07/13/22 108/70   07/06/22 (!) 160/104      Pulse Readings from Last 5 Encounters:   03/06/23 100   03/03/23 95   07/20/22 " (!) 110   07/13/22 (!) 104   07/06/22 (!) 111      Wt Readings from Last 5 Encounters:   03/06/23 93.7 kg (206 lb 8 oz)   03/03/23 100 kg (220 lb 9.6 oz)   07/20/22 96.2 kg (212 lb)   07/13/22 99.8 kg (220 lb)   07/06/22 107 kg (235 lb)       Physical Exam  Vitals reviewed.   Constitutional:       General: He is not in acute distress.     Appearance: He is well-developed.   Eyes:      Conjunctiva/sclera: Conjunctivae normal.      Pupils: Pupils are equal, round, and reactive to light.   Neck:      Vascular: No JVD.      Comments: JVP still elevated but significantly improved  Cardiovascular:      Rate and Rhythm: Normal rate. Rhythm irregular.      Pulses:           Carotid pulses are 2+ on the right side and 2+ on the left side.     Heart sounds: Murmur heard.     No friction rub. No gallop.   Pulmonary:      Effort: Pulmonary effort is normal. No accessory muscle usage or respiratory distress.      Breath sounds: No wheezing or rales.   Abdominal:      Palpations: Abdomen is soft.      Comments: Ascites   Musculoskeletal:      Cervical back: Normal range of motion and neck supple.      Right lower leg: Edema present.      Left lower leg: Edema present.      Comments: Edema improved   Skin:     General: Skin is warm and dry.      Findings: No rash.      Nails: There is no clubbing.   Neurological:      Mental Status: He is alert and oriented to person, place, and time.   Psychiatric:         Behavior: Behavior normal.          CARDIAC CATHETERIZATION 7/17/1997.  1.  Left ventricular ejection fraction 64%.  2.  Normal coronary arteries.    ECHOCARDIOGRAM 06/06/2013  Left ejection fraction 55-60%.  Mild concentric left ventricular hypertrophy.  Mild mitral regurgitation.  Severe left atrial dilatation.  Moderate right atrial dilatation.     ECHOCARDIOGRAM 09/02/2020  No prior study is available for comparison.   Normal left ventricular chamber size.  Mildly reduced left ventricular systolic function.  Left  ventricular ejection fraction is visually estimated to be 45-50%.  Aortic sclerosis without stenosis.  Mild mitral regurgitation.  Normal right ventricular size and systolic function.  Right heart pressures are normal.  Severely dilated left atrium.  Rythym is atrial fibrillation.     TRANSESOPHAGEAL ECHOCARDIOGRAM 4/27/2021.  Mild to moderately reduced left ventricular systolic function.  Left ventricular ejection fraction is visually estimated to be 40-45%.  Mild aortic insufficiency.  Mild degenerative mitral valve leaftets.  Moderate mitral regurgitation, central with 2 jets.    ECHOCARDIOGRAM 12/8/2021  Normal left ventricular chamber size.  Mildly reduced left ventricular systolic function.  The left ventricular ejection fraction is visually estimated to be 50%;   variable due to atrial fibrillation.  Mild concentric left ventricular hypertrophy.  Aortic sclerosis without stenosis.  Mild aortic insufficiency.  Moderate mitral regurgitation; possibly more severe.  Estimated right ventricular systolic pressure is 65 mmHg.  Dilated right ventricle with reduced right ventricular systolic   function.  Severe biatrial enlargement.  Rhythm is atrial fibrillation.  Compared to prior echo on 12/08/2020; no significant change.    ECHOCARDIOGRAM 3/6/2023  Normal left ventricular chamber size.  Moderately reduced left ventricular systolic function.  The left ventricular ejection fraction is visually estimated to be 35%.  Mild to moderate aortic insufficiency.  Known mitral valve repair which is functioning normally with   appropriate transvalvular gradient.  Mild mitral regurgitation.  Estimated right ventricular systolic pressure is 40 mmHg.  Dilated inferior vena cava with inspiratory collapse.  Severel biatrial enlargement..  The right ventricle is dilated.  Reduced right ventricular systolic function.  Compared to the images of the prior study on 12/08/2021 left   ventricular function is reduced; right ventricular  function remains   dilated with reduced function.     EKG 04/25/2013 atrial fibrillation, rate 83.    EKG 7/6/2022 atrial fibrillation, rate 106, personally interpreted.    EKG 3/3/2023 atrial fibrillation, rate 90s, LAFB, incomplete RBBB, personally interpreted    Assessment & Plan     1. Acute heart failure with preserved ejection fraction (HCC)  REFERRAL TO CARDIOLOGY      2. Acute on chronic right-sided heart failure (HCC)        3. Atrial fibrillation, chronic (HCC)        4. Essential hypertension        5. Coronary artery disease, occlusive        6. S/P CABG (coronary artery bypass graft)        7. S/P MVR (mitral valve repair)        8. S/P tricuspid valve repair            Medical Decision Making: Today's Assessment/Status/Plan:   Assessment  Atrial fibrillation, chronic  HFpEF, EF 45-50%, biventricular with right heart failure.  S/P MV repair (32 mm ring), TV repair (30 mm ring), CABG LIMA-LAD, Maze procedure and JANINE excision 3/25/2022 [place], [place]   PPM 4/1/2022 [facility]  CKD  Hypertension.  Dyslipidemia.  HOWARD on CPAP.  Obesity.  LE edema aggravated by diltiazem.  H/O HOWARD, diagnosed 20 years ago, currently untreated.     Recommendation and recommendation  HFpEF 35-40%, symptomatically improved with enhanced diuresis, continue torsemide 60 mg bid, metolazone 2.5 mg qod to achieve an additional 10 pound weight loss, switch metoprolol to tartrate 12.5 mg bid to metoprolol XL 25 mg qd maximize once heart failure resolved, consider introducing spironolactone dapagliflozin and restarting losartan once renal function and BP stabilizes  Echocardiogram images reviewed with patient and wife with overall LV contractility reduced compared to prior study  Atrial fibrillation, permanent, post Maze, on no anticoagulation after JANINE excision, referred back to EP for rhythm control recommendations if at all possible, otherwise optimize heart rate control and will  continue metoprolol and maximize dose once heart failure resolves.  S/P MV repair, TR repair, CABG LIMA-LAD, JANINE excision, Maze.  CAD/CABG, clinically stable, continue aspirin, atorvastatin, metoprolol  Hypertension, normal, at goal currently on metoprolol only.  Dyslipidemia, well continue atorvastatin  CKD: Nephrology, Followed by [facility]  PPM, reviewed 12/14/2022 interrogation showing 100% mode switch, 15% V paced  EP clinic referral to see if possible to reestablish rhythm control   Refer to heart failure clinic for long-term care for regular close follow up.

## 2023-03-10 ENCOUNTER — TELEPHONE (OUTPATIENT)
Dept: HEALTH INFORMATION MANAGEMENT | Facility: OTHER | Age: 73
End: 2023-03-10
Payer: MEDICARE

## 2023-03-21 ENCOUNTER — HOSPITAL ENCOUNTER (OUTPATIENT)
Dept: LAB | Facility: MEDICAL CENTER | Age: 73
End: 2023-03-21
Attending: INTERNAL MEDICINE
Payer: MEDICARE

## 2023-03-21 DIAGNOSIS — I50.31 ACUTE HEART FAILURE WITH PRESERVED EJECTION FRACTION (HFPEF) (HCC): ICD-10-CM

## 2023-03-21 LAB
ANION GAP SERPL CALC-SCNC: 15 MMOL/L (ref 7–16)
BUN SERPL-MCNC: 59 MG/DL (ref 8–22)
CALCIUM SERPL-MCNC: 9.6 MG/DL (ref 8.5–10.5)
CHLORIDE SERPL-SCNC: 101 MMOL/L (ref 96–112)
CO2 SERPL-SCNC: 24 MMOL/L (ref 20–33)
CREAT SERPL-MCNC: 3.08 MG/DL (ref 0.5–1.4)
GFR SERPLBLD CREATININE-BSD FMLA CKD-EPI: 21 ML/MIN/1.73 M 2
GLUCOSE SERPL-MCNC: 98 MG/DL (ref 65–99)
POTASSIUM SERPL-SCNC: 3.7 MMOL/L (ref 3.6–5.5)
SODIUM SERPL-SCNC: 140 MMOL/L (ref 135–145)

## 2023-03-21 PROCEDURE — 84443 ASSAY THYROID STIM HORMONE: CPT

## 2023-03-21 PROCEDURE — 80048 BASIC METABOLIC PNL TOTAL CA: CPT

## 2023-03-21 PROCEDURE — 36415 COLL VENOUS BLD VENIPUNCTURE: CPT

## 2023-03-21 PROCEDURE — 84439 ASSAY OF FREE THYROXINE: CPT

## 2023-03-22 DIAGNOSIS — I50.30 ACC/AHA STAGE C HEART FAILURE WITH PRESERVED EJECTION FRACTION (HCC): ICD-10-CM

## 2023-03-22 DIAGNOSIS — N18.32 STAGE 3B CHRONIC KIDNEY DISEASE: ICD-10-CM

## 2023-03-22 DIAGNOSIS — I10 ESSENTIAL HYPERTENSION: ICD-10-CM

## 2023-03-22 DIAGNOSIS — I50.31 ACUTE HEART FAILURE WITH PRESERVED EJECTION FRACTION (HCC): ICD-10-CM

## 2023-03-22 LAB
T4 FREE SERPL-MCNC: 1.41 NG/DL (ref 0.93–1.7)
TSH SERPL DL<=0.005 MIU/L-ACNC: 4.08 UIU/ML (ref 0.38–5.33)

## 2023-03-22 RX ORDER — TORSEMIDE 20 MG/1
40 TABLET ORAL 2 TIMES DAILY
Qty: 60 TABLET | Refills: 3 | Status: SHIPPED
Start: 2023-03-22 | End: 2023-03-23

## 2023-03-22 NOTE — RESULT ENCOUNTER NOTE
Please notify Zander that I have reviewed lab results  Thyroid tests are now normal  Agree with change in spironolactone dosage  Potassium level is normla

## 2023-03-23 ENCOUNTER — OFFICE VISIT (OUTPATIENT)
Dept: CARDIOLOGY | Facility: MEDICAL CENTER | Age: 73
End: 2023-03-23
Attending: INTERNAL MEDICINE
Payer: MEDICARE

## 2023-03-23 ENCOUNTER — TELEPHONE (OUTPATIENT)
Dept: CARDIOLOGY | Facility: MEDICAL CENTER | Age: 73
End: 2023-03-23

## 2023-03-23 VITALS
OXYGEN SATURATION: 98 % | RESPIRATION RATE: 18 BRPM | WEIGHT: 207 LBS | BODY MASS INDEX: 29.63 KG/M2 | SYSTOLIC BLOOD PRESSURE: 128 MMHG | DIASTOLIC BLOOD PRESSURE: 80 MMHG | HEART RATE: 92 BPM | HEIGHT: 70 IN

## 2023-03-23 DIAGNOSIS — Z98.890 S/P TRICUSPID VALVE REPAIR: ICD-10-CM

## 2023-03-23 DIAGNOSIS — D68.69 HYPERCOAGULABILITY DUE TO ATRIAL FIBRILLATION (HCC): ICD-10-CM

## 2023-03-23 DIAGNOSIS — I51.89 LEFT VENTRICULAR DIASTOLIC DYSFUNCTION, NYHA CLASS 3: ICD-10-CM

## 2023-03-23 DIAGNOSIS — I48.20 ATRIAL FIBRILLATION, CHRONIC (HCC): ICD-10-CM

## 2023-03-23 DIAGNOSIS — N18.32 STAGE 3B CHRONIC KIDNEY DISEASE: ICD-10-CM

## 2023-03-23 DIAGNOSIS — Z98.890 S/P MVR (MITRAL VALVE REPAIR): ICD-10-CM

## 2023-03-23 DIAGNOSIS — I10 ESSENTIAL HYPERTENSION: ICD-10-CM

## 2023-03-23 DIAGNOSIS — I50.30 ACC/AHA STAGE C HEART FAILURE WITH PRESERVED EJECTION FRACTION (HCC): ICD-10-CM

## 2023-03-23 DIAGNOSIS — Z95.0 CARDIAC PACEMAKER IN SITU: ICD-10-CM

## 2023-03-23 DIAGNOSIS — I25.10 CORONARY ARTERY DISEASE, OCCLUSIVE: ICD-10-CM

## 2023-03-23 DIAGNOSIS — I50.31 ACUTE HEART FAILURE WITH PRESERVED EJECTION FRACTION (HCC): ICD-10-CM

## 2023-03-23 DIAGNOSIS — Z95.1 S/P CABG (CORONARY ARTERY BYPASS GRAFT): ICD-10-CM

## 2023-03-23 DIAGNOSIS — R06.09 DYSPNEA ON EXERTION: ICD-10-CM

## 2023-03-23 DIAGNOSIS — M79.89 LEG SWELLING: ICD-10-CM

## 2023-03-23 DIAGNOSIS — Z79.899 HIGH RISK MEDICATION USE: ICD-10-CM

## 2023-03-23 DIAGNOSIS — I48.91 HYPERCOAGULABILITY DUE TO ATRIAL FIBRILLATION (HCC): ICD-10-CM

## 2023-03-23 DIAGNOSIS — I50.20 ACC/AHA STAGE C SYSTOLIC CONGESTIVE HEART FAILURE (HCC): ICD-10-CM

## 2023-03-23 PROCEDURE — 99214 OFFICE O/P EST MOD 30 MIN: CPT | Performed by: INTERNAL MEDICINE

## 2023-03-23 RX ORDER — TORSEMIDE 20 MG/1
40 TABLET ORAL 2 TIMES DAILY
Qty: 360 TABLET | Refills: 3 | Status: SHIPPED | OUTPATIENT
Start: 2023-03-23 | End: 2023-09-26 | Stop reason: SDUPTHER

## 2023-03-23 RX ORDER — METOPROLOL SUCCINATE 25 MG/1
TABLET, EXTENDED RELEASE ORAL
COMMUNITY
Start: 2023-03-07 | End: 2023-03-23

## 2023-03-23 ASSESSMENT — MINNESOTA LIVING WITH HEART FAILURE QUESTIONNAIRE (MLHF)
MAKING YOU FEEL DEPRESSED: 3
HAVING TO SIT OR LIE DOWN DURING THE DAY: 1
TIRED, FATIGUED OR LOW ON ENERGY: 3
LOSS OF SELF CONTROL IN YOUR LIFE: 3
MAKING YOU STAY IN A HOSPITAL: 0
FEELING LIKE A BURDEN TO FAMILY AND FRIENDS: 3
DIFFICULTY GOING AWAY FROM HOME: 2
DIFFICULTY WORKING TO EARN A LIVING: 0
DIFFICULTY TO CONCENTRATE OR REMEMBERING THINGS: 3
TOTAL_SCORE: 51
GIVING YOU SIDE EFFECTS FROM TREATMENTS: 2
WALKING ABOUT OR CLIMBING STAIRS DIFFICULT: 2
WORKING AROUND THE HOUSE OR YARD DIFFICULT: 3
MAKING YOU SHORT OF BREATH: 3
DIFFICULTY WITH SEXUAL ACTIVITIES: 3
MAKING YOU WORRY: 3
DIFFICULTY SLEEPING WELL AT NIGHT: 2
EATING LESS FOODS YOU LIKE: 3
COSTING YOU MONEY FOR MEDICAL CARE: 3
DIFFICULTY WITH RECREATIONAL PASTIMES, SPORTS, HOBBIES: 3
SWELLING IN ANKLES OR LEGS: 3
DIFFICULTY SOCIALIZING WITH FAMILY OR FRIENDS: 3

## 2023-03-23 ASSESSMENT — ENCOUNTER SYMPTOMS
FEVER: 0
SHORTNESS OF BREATH: 1
DIAPHORESIS: 0
DEPRESSION: 0
BRUISES/BLEEDS EASILY: 0
BLURRED VISION: 0
PALPITATIONS: 0
HEADACHES: 0
SENSORY CHANGE: 0
FALLS: 0
MEMORY LOSS: 0
ABDOMINAL PAIN: 0
DOUBLE VISION: 0
MYALGIAS: 0
COUGH: 0
DIZZINESS: 0

## 2023-03-23 ASSESSMENT — FIBROSIS 4 INDEX: FIB4 SCORE: 0.96

## 2023-03-23 ASSESSMENT — 6 MINUTE WALK TEST (6MWT): TOTAL DISTANCE WALKED (METERS): 0

## 2023-03-23 NOTE — PROGRESS NOTES
Chief Complaint   Patient presents with    Atrial Fibrillation    New Patient     F/V Dx: Acute heart failure with preserved ejection fraction (HCC    CHF (Chronic)     F/v Dx: ACC/AHA stage C heart failure with preserved ejection fraction (HCC)       Subjective     Maury Stewart is a 73 y.o. male who presents today for cardiac care and management in heart failure clinic due to recent reduction in LVEF, heart failure with reduced ejection fraction, NYHA class III, valvular disease status post repair of mitral valve and tricuspid valve, permanent atrial fibrillation, status post permanent pacemaker placement, CAD s/p CABG LIMA to LAD.    I personally interpreted the images of his recent transthoracic echocardiogram which show LV function of 35%, moderate aortic regurgitation, mild mitral regurgitation, estimated RVSP of 40 mmHg, known mitral repair which is functioning properly.  This was done in March 2023.    I personally interpreted the blood test result which showed GFR of 21, potassium of 3.7.    Patient still gets winded with daily living activities and exertion. No symptoms at rest.    Past Medical History:   Diagnosis Date    Arthritis 04/23/2021    Osteo- Fingers/Hands    Atrial fibrillation (HCC)     Atrial flutter (HCC)     Breath shortness 04/23/2021    In the past, is a current problem frequently    Chickenpox     Chronic anticoagulation     Heart burn 04/23/2021    GERD    Polycystic kidney, unspecified type     Pure hypercholesterolemia     Sleep apnea     on oxygen, unable to tolerate CPAP.    Unspecified essential hypertension     Unspecified sleep apnea      Past Surgical History:   Procedure Laterality Date    TONSILLECTOMY  1982    APPENDECTOMY  1966    APPENDECTOMY      OTHER      Cardiac Cath    OTHER      Kidney Surgery    OTHER      Nose Surgery    OTHER      Tonsillectomy with Adenoidectomy     Family History   Problem Relation Age of Onset    Heart Disease Neg Hx      Social History      Socioeconomic History    Marital status:      Spouse name: Not on file    Number of children: Not on file    Years of education: Not on file    Highest education level: Not on file   Occupational History    Not on file   Tobacco Use    Smoking status: Every Day     Packs/day: 1.00     Types: Cigars, Cigarettes    Smokeless tobacco: Never   Vaping Use    Vaping Use: Never used   Substance and Sexual Activity    Alcohol use: Yes     Comment: rarely    Drug use: No    Sexual activity: Not on file   Other Topics Concern    Not on file   Social History Narrative    Not on file     Social Determinants of Health     Financial Resource Strain: Not on file   Food Insecurity: Not on file   Transportation Needs: Not on file   Physical Activity: Not on file   Stress: Not on file   Social Connections: Not on file   Intimate Partner Violence: Not on file   Housing Stability: Not on file     No Known Allergies  Outpatient Encounter Medications as of 3/23/2023   Medication Sig Dispense Refill    torsemide (DEMADEX) 20 MG Tab Take 2 Tablets by mouth 2 times a day. 60 Tablet 3    metoprolol SR (TOPROL XL) 25 MG TABLET SR 24 HR Take 1 Tablet by mouth every day. 90 Tablet 3    metOLazone (ZAROXOLYN) 2.5 MG Tab Take 1 Tablet by mouth every day. 30 Tablet 11    aspirin 81 MG EC tablet Take 81 mg by mouth every day.      omeprazole (PRILOSEC) 20 MG CPDR Take 20 mg by mouth 2 times a day.      zolpidem (AMBIEN) 10 MG TABS Take 10 mg by mouth at bedtime as needed.      [DISCONTINUED] metoprolol SR (TOPROL XL) 25 MG TABLET SR 24 HR  (Patient not taking: Reported on 3/23/2023)      [DISCONTINUED] losartan (COZAAR) 25 MG Tab Take 1 Tablet by mouth every day. (Patient not taking: Reported on 3/3/2023) 90 Tablet 3    [DISCONTINUED] potassium chloride ER (KLOR-CON) 10 MEQ tablet Take 10 mEq by mouth every 48 hours. (Patient not taking: Reported on 3/23/2023)       No facility-administered encounter medications on file as of 3/23/2023.  "    Review of Systems   Constitutional:  Negative for diaphoresis and fever.   HENT:  Negative for nosebleeds.    Eyes:  Negative for blurred vision and double vision.   Respiratory:  Positive for shortness of breath. Negative for cough.    Cardiovascular:  Positive for leg swelling. Negative for chest pain and palpitations.   Gastrointestinal:  Negative for abdominal pain.   Genitourinary:  Negative for dysuria and frequency.   Musculoskeletal:  Negative for falls and myalgias.   Skin:  Negative for rash.   Neurological:  Negative for dizziness, sensory change and headaches.   Endo/Heme/Allergies:  Does not bruise/bleed easily.   Psychiatric/Behavioral:  Negative for depression and memory loss.             Objective     /80 (BP Location: Left arm, Patient Position: Sitting, BP Cuff Size: Adult)   Pulse 92   Resp 18   Ht 1.778 m (5' 10\")   Wt 93.9 kg (207 lb)   SpO2 98%   BMI 29.70 kg/m²     Physical Exam  Vitals and nursing note reviewed.   Constitutional:       General: He is not in acute distress.     Appearance: He is not diaphoretic.   HENT:      Head: Normocephalic and atraumatic.      Right Ear: External ear normal.      Left Ear: External ear normal.      Nose: No congestion or rhinorrhea.   Eyes:      General:         Right eye: No discharge.         Left eye: No discharge.   Neck:      Thyroid: No thyromegaly.      Vascular: No JVD.   Cardiovascular:      Rate and Rhythm: Normal rate and regular rhythm.      Pulses: Normal pulses.   Pulmonary:      Effort: No respiratory distress.   Abdominal:      General: There is no distension.      Tenderness: There is no abdominal tenderness.   Musculoskeletal:         General: No swelling or tenderness.      Right lower leg: Edema present.      Left lower leg: Edema present.   Skin:     General: Skin is warm and dry.   Neurological:      Mental Status: He is alert and oriented to person, place, and time.      Cranial Nerves: No cranial nerve deficit. "   Psychiatric:         Behavior: Behavior normal.              Assessment & Plan     1. ACC/AHA stage C systolic congestive heart failure (HCC)  Basic Metabolic Panel    proBrain Natriuretic Peptide, NT      2. Left ventricular diastolic dysfunction, NYHA class 3  Basic Metabolic Panel    proBrain Natriuretic Peptide, NT      3. Coronary artery disease, occlusive        4. S/P CABG (coronary artery bypass graft)        5. Stage 3b chronic kidney disease (HCC)        6. S/P tricuspid valve repair        7. S/P MVR (mitral valve repair)        8. Cardiac pacemaker in situ        9. Atrial fibrillation, chronic (HCC)        10. Hypercoagulability due to atrial fibrillation (HCC)        11. Dyspnea on exertion  Basic Metabolic Panel    proBrain Natriuretic Peptide, NT      12. High risk medication use  Basic Metabolic Panel      13. Leg swelling  US-EXTREMITY VENOUS LOWER BILAT          Medical Decision Making: Today's Assessment/Status/Plan:   Dilated Cardiomyopathy, LVEF of 35%:  Chronically illed condition which requires ongoing close monitoring and treatment to improve survival rate along with decreasing risk of clinical decompensation sudden cardiac death and hospitalization.    Today, based on physical examination findings, patient is euvolemic. No JVD, lungs are clear to auscultation, no pitting edema in bilateral lower extremities, no ascites.     Dry weight is 207 lbs.    Based on recent data on SGLT2 and heart failure with reduced ejection fraction, patient will be benefited from Jardiance 10 mg p.o. once a day for further reduction in mortality and hospitalization with absolute risk reduction of 5.2%.  Therefore, I will start patient on Jardiance 10 mg p.o. once a day.  Risks and benefits were explained to patient and patient has agreed to proceed.    Due to low GFR, ARNI, ARB, ACE-I and Spironolactone are all contraindicated.     Will increase Metoprolol SR (Toprol XL) To 50 mg po daily.     Diuretic with  Torsemide 40 mg 2x daily along with Metolazone 2.5 mg daily.    Patient would like to defer Cardiomems.     We will consider ICD placement for primary prevention in 3 months if left ventricular systolic function remains less than 35% after optimization of evidence based heart failure medical regimen.    We will also check lower extremities venous duplex to assess the anatomy for her lower extremities venous system.    Coronary arterial disease s/p CABG SEAMAN to LAD:  Continue asa, BB, and statin at current dose.    Persistent atrial fibrillation:  No anticoagulation via s/p JANINE excision.  Will see EP in future for rhythm control.    Hypertension:  Optimize control using cardiomyopathy medical regimen as well.    Hyperlipidemia:  Optimize statin as within guidelines of CAD treatment as above.       Of note, during the care of this patient, I spent a significant amount of time explaining the nature of the disease process, reviewing all possible imaging studies, blood test results to patient.  The overall care of this patient require a higher level of care than usual due to the multiple comorbidities along with ongoing issues of congestive heart failure that put this patient at risk for sudden cardiac death, increased mortality, and hospitalization.  This patient also requires close monitoring with at least monthly blood test to monitor renal function and electrolytes due to the ongoing dynamic changes of medical therapy titration protocol to ensure optimal benefits for the overall care of this patient.

## 2023-03-23 NOTE — TELEPHONE ENCOUNTER
TT    Caller: [facility]    Topic/issue: Jeni called and wanted to get a clarification on the dosage and quantity for torsemide (DEMADEX) 20 MG Tab. It was sent over for Sig - Route: Take 2 Tablets by mouth 2 times a day, but only for a 30 day supply and not 90 days for the 120 tablets.    Callback Number: [phone]   [address] [facility] [address]   Phone:  [phone]  Fax:  [phone]   HADLEY #:  [id number]

## 2023-03-28 ENCOUNTER — TELEPHONE (OUTPATIENT)
Dept: CARDIOLOGY | Facility: MEDICAL CENTER | Age: 73
End: 2023-03-28
Payer: MEDICARE

## 2023-03-28 NOTE — TELEPHONE ENCOUNTER
TT    Caller: Zander Stewart    Topic/issue: JARDIANCE 10MG    Patient states that he was supposed to get this new medication. Patient states that he has not received it yet from his preferred pharmacy. Please advise.    Thank you,  Gab GARCÍA    Callback Number: [phone] (home)

## 2023-05-15 ENCOUNTER — HOSPITAL ENCOUNTER (OUTPATIENT)
Dept: LAB | Facility: MEDICAL CENTER | Age: 73
End: 2023-05-15
Attending: PHYSICIAN ASSISTANT
Payer: MEDICARE

## 2023-05-15 ENCOUNTER — HOSPITAL ENCOUNTER (OUTPATIENT)
Dept: LAB | Facility: MEDICAL CENTER | Age: 73
End: 2023-05-15
Attending: INTERNAL MEDICINE
Payer: MEDICARE

## 2023-05-15 LAB
ALBUMIN SERPL BCP-MCNC: 4.1 G/DL (ref 3.2–4.9)
ALT SERPL-CCNC: 8 U/L (ref 2–50)
AST SERPL-CCNC: 9 U/L (ref 12–45)
BASOPHILS # BLD AUTO: 0.9 % (ref 0–1.8)
BASOPHILS # BLD: 0.08 K/UL (ref 0–0.12)
CREAT SERPL-MCNC: 2.72 MG/DL (ref 0.5–1.4)
EOSINOPHIL # BLD AUTO: 0.58 K/UL (ref 0–0.51)
EOSINOPHIL NFR BLD: 6.7 % (ref 0–6.9)
ERYTHROCYTE [DISTWIDTH] IN BLOOD BY AUTOMATED COUNT: 62.1 FL (ref 35.9–50)
ERYTHROCYTE [SEDIMENTATION RATE] IN BLOOD BY WESTERGREN METHOD: 7 MM/HOUR (ref 0–20)
FSH SERPL-ACNC: 11.1 MIU/ML (ref 1.5–12.4)
GFR SERPLBLD CREATININE-BSD FMLA CKD-EPI: 24 ML/MIN/1.73 M 2
HCT VFR BLD AUTO: 43.3 % (ref 42–52)
HGB BLD-MCNC: 13.6 G/DL (ref 14–18)
IMM GRANULOCYTES # BLD AUTO: 0.04 K/UL (ref 0–0.11)
IMM GRANULOCYTES NFR BLD AUTO: 0.5 % (ref 0–0.9)
LH SERPL-ACNC: 12.4 IU/L (ref 1.7–8.6)
LYMPHOCYTES # BLD AUTO: 1.38 K/UL (ref 1–4.8)
LYMPHOCYTES NFR BLD: 15.9 % (ref 22–41)
MCH RBC QN AUTO: 28.2 PG (ref 27–33)
MCHC RBC AUTO-ENTMCNC: 31.4 G/DL (ref 33.7–35.3)
MCV RBC AUTO: 89.6 FL (ref 81.4–97.8)
MONOCYTES # BLD AUTO: 0.67 K/UL (ref 0–0.85)
MONOCYTES NFR BLD AUTO: 7.7 % (ref 0–13.4)
NEUTROPHILS # BLD AUTO: 5.94 K/UL (ref 1.82–7.42)
NEUTROPHILS NFR BLD: 68.3 % (ref 44–72)
NRBC # BLD AUTO: 0 K/UL
NRBC BLD-RTO: 0 /100 WBC
PLATELET # BLD AUTO: 244 K/UL (ref 164–446)
PMV BLD AUTO: 10.7 FL (ref 9–12.9)
PROLACTIN SERPL-MCNC: 8.31 NG/ML (ref 2.1–17.7)
PSA SERPL-MCNC: 0.44 NG/ML (ref 0–4)
RBC # BLD AUTO: 4.83 M/UL (ref 4.7–6.1)
RHEUMATOID FACT SER IA-ACNC: 60 IU/ML (ref 0–14)
URATE SERPL-MCNC: 11.3 MG/DL (ref 2.5–8.3)
WBC # BLD AUTO: 8.7 K/UL (ref 4.8–10.8)

## 2023-05-15 PROCEDURE — 86431 RHEUMATOID FACTOR QUANT: CPT

## 2023-05-15 PROCEDURE — 84146 ASSAY OF PROLACTIN: CPT

## 2023-05-15 PROCEDURE — 84450 TRANSFERASE (AST) (SGOT): CPT

## 2023-05-15 PROCEDURE — 83002 ASSAY OF GONADOTROPIN (LH): CPT

## 2023-05-15 PROCEDURE — 36415 COLL VENOUS BLD VENIPUNCTURE: CPT

## 2023-05-15 PROCEDURE — 82565 ASSAY OF CREATININE: CPT

## 2023-05-15 PROCEDURE — 86200 CCP ANTIBODY: CPT

## 2023-05-15 PROCEDURE — 83001 ASSAY OF GONADOTROPIN (FSH): CPT

## 2023-05-15 PROCEDURE — 82040 ASSAY OF SERUM ALBUMIN: CPT

## 2023-05-15 PROCEDURE — 84153 ASSAY OF PSA TOTAL: CPT | Mod: GA

## 2023-05-15 PROCEDURE — 84460 ALANINE AMINO (ALT) (SGPT): CPT

## 2023-05-15 PROCEDURE — 84550 ASSAY OF BLOOD/URIC ACID: CPT

## 2023-05-15 PROCEDURE — 85652 RBC SED RATE AUTOMATED: CPT

## 2023-05-15 PROCEDURE — 85025 COMPLETE CBC W/AUTO DIFF WBC: CPT

## 2023-05-17 LAB — CCP IGG SERPL-ACNC: 3 UNITS (ref 0–19)

## 2023-05-22 ENCOUNTER — HOSPITAL ENCOUNTER (OUTPATIENT)
Dept: RADIOLOGY | Facility: MEDICAL CENTER | Age: 73
End: 2023-05-22
Attending: INTERNAL MEDICINE
Payer: MEDICARE

## 2023-05-22 DIAGNOSIS — M79.89 LEG SWELLING: ICD-10-CM

## 2023-05-22 PROCEDURE — 93970 EXTREMITY STUDY: CPT | Mod: 26 | Performed by: INTERNAL MEDICINE

## 2023-05-22 PROCEDURE — 93970 EXTREMITY STUDY: CPT

## 2023-05-23 ENCOUNTER — TELEPHONE (OUTPATIENT)
Dept: CARDIOLOGY | Facility: MEDICAL CENTER | Age: 73
End: 2023-05-23
Payer: MEDICARE

## 2023-05-23 NOTE — TELEPHONE ENCOUNTER
Remote Transmission 5/23/2023:    1-AF w/RVR episode 4/22/2023@9:23 pm lasting 51 secs.  1-AF w/RVR ?? NSVT episode 5/11/2023@7:34 pm lasting 7 secs.    Full report scanned into media.

## 2023-05-25 ENCOUNTER — TELEPHONE (OUTPATIENT)
Dept: CARDIOLOGY | Facility: MEDICAL CENTER | Age: 73
End: 2023-05-25
Payer: MEDICARE

## 2023-05-25 DIAGNOSIS — N18.32 STAGE 3B CHRONIC KIDNEY DISEASE: ICD-10-CM

## 2023-05-25 DIAGNOSIS — Z98.890 S/P TRICUSPID VALVE REPAIR: ICD-10-CM

## 2023-05-25 DIAGNOSIS — Z95.0 CARDIAC PACEMAKER IN SITU: ICD-10-CM

## 2023-05-25 DIAGNOSIS — I50.30 ACC/AHA STAGE C HEART FAILURE WITH PRESERVED EJECTION FRACTION (HCC): ICD-10-CM

## 2023-05-25 DIAGNOSIS — I48.0 PAF (PAROXYSMAL ATRIAL FIBRILLATION) (HCC): ICD-10-CM

## 2023-05-25 DIAGNOSIS — Z98.890 S/P MVR (MITRAL VALVE REPAIR): ICD-10-CM

## 2023-05-25 DIAGNOSIS — I51.89 LEFT VENTRICULAR DIASTOLIC DYSFUNCTION, NYHA CLASS 3: ICD-10-CM

## 2023-05-25 DIAGNOSIS — Z95.1 S/P CABG (CORONARY ARTERY BYPASS GRAFT): ICD-10-CM

## 2023-05-25 DIAGNOSIS — I25.10 CORONARY ARTERY DISEASE, OCCLUSIVE: ICD-10-CM

## 2023-05-25 PROCEDURE — 99443 PR PHYSICIAN TELEPHONE EVALUATION 21-30 MIN: CPT | Mod: 95 | Performed by: INTERNAL MEDICINE

## 2023-05-25 NOTE — TELEPHONE ENCOUNTER
Telephone Appointment Visit    This telephone visit was initiated by the patient and they verbally consented. Patient is on home [facility].    Reason for Call:   Patient is found to have PAF on device transmission.    HPI:    Maury Stewart is a 73 y.o. male who presents today for cardiac care and management in heart failure clinic due to recent reduction in LVEF, heart failure with reduced ejection fraction, NYHA class III, valvular disease status post repair of mitral valve and tricuspid valve, permanent atrial fibrillation, status post permanent pacemaker placement, CAD s/p CABG LIMA to LAD.    Found to have PAF on interrogation.       Labs / Images Reviewed:   Found to have PAF on device interrogation.     Assessment and Plan:     1. PAF (paroxysmal atrial fibrillation) ([facility])    2. ACC/AHA stage C heart failure with preserved ejection fraction ([facility])    3. Left ventricular diastolic dysfunction, NYHA class 3    4. S/P CABG (coronary artery bypass graft)    5. Stage 3b chronic kidney disease ([facility])    6. Coronary artery disease, occlusive    7. S/P MVR (mitral valve repair)    8. Cardiac pacemaker in situ    9. S/P tricuspid valve repair    Will refer to anticoagulation for starting Warfarin.    Also discussed vascular evaluation of persistent symptoms of lower extremities edema, pain in setting of possible venous insufficiency and reflux disease.  Patient has tried compression stockings without success.  Patient is now seeking for invasive intervention for symptomatic relief and improving quality of life.  No prior vein treatments.  No prior vein stripping surgery.    I personally interpreted the venous duplex ultrasound which showed significant reflux disease venous insufficiency seen in LEFT great saphenous vein(s).  I personally reviewed the images with patient in clinic today as well.      Follow-up: as scheduled.    Total Time Spent (mins): 25.    Angelica Curry M.D.

## 2023-05-26 ENCOUNTER — DOCUMENTATION (OUTPATIENT)
Dept: VASCULAR LAB | Facility: MEDICAL CENTER | Age: 73
End: 2023-05-26
Payer: MEDICARE

## 2023-05-26 NOTE — PROGRESS NOTES
[facility]      Called and left msg for pt to call back to establish care regarding anticoagulation referral for warfarin due to Hx of PAF  from Dr. Curry on 5/25/23      Requested the patient call the clinic back to set up initial appt.      Insurance: Medicare  PCP: non-[facility]   Locations to be seen: MAIN     Phone number left for return call or any questions or concerns.  [facility] at [phone], fax [phone]      Myra PhelanD

## 2023-05-31 ENCOUNTER — TELEPHONE (OUTPATIENT)
Dept: CARDIOLOGY | Facility: MEDICAL CENTER | Age: 73
End: 2023-05-31
Payer: MEDICARE

## 2023-05-31 ENCOUNTER — TELEPHONE (OUTPATIENT)
Dept: VASCULAR LAB | Facility: MEDICAL CENTER | Age: 73
End: 2023-05-31
Payer: MEDICARE

## 2023-05-31 NOTE — TELEPHONE ENCOUNTER
"[facility] and Pharmacotherapy [facility]     Received anticoagulation referral from  To    Per tele note 5/25: \"Will refer to anticoagulation for starting Warfarin.\"     Although, Eliquis 2.5mg bid may also be appropriate despite ESRD     2nd call to establish care    Insurance: Medicare  PCP: non-[facility]   Locations to be seen: MAIN     Phone number left for return call or any questions or concerns.  [facility] at [phone], fax [phone]     Radha Patel, PharmD    "

## 2023-05-31 NOTE — TELEPHONE ENCOUNTER
LVM for pt in regards to lab work that was ordered at previous OV with . Office number given for call back if pt has any questions or has had lab work done outside of [facility]. Pt has follow up appointment scheduled with  on 06/06/2023.

## 2023-06-02 ENCOUNTER — ANTICOAGULATION MONITORING (OUTPATIENT)
Dept: VASCULAR LAB | Facility: MEDICAL CENTER | Age: 73
End: 2023-06-02
Payer: MEDICARE

## 2023-06-02 NOTE — PROGRESS NOTES
[facility]        Called and left msg for pt to call back to establish care regarding anticoagulation referral for warfarin due to Hx of PAF  from Dr. Curry on 5/25/23      Requested the patient call the clinic back to set up initial appt.     3rd call  Sent pt MyChart msg as well     Insurance: Medicare  PCP: non-[facility]   Locations to be seen: MAIN     Phone number left for return call or any questions or concerns.  [facility] at [phone], fax [phone]        Myra PhelanD

## 2023-06-06 ENCOUNTER — TELEPHONE (OUTPATIENT)
Dept: VASCULAR LAB | Facility: MEDICAL CENTER | Age: 73
End: 2023-06-06
Payer: MEDICARE

## 2023-06-06 NOTE — TELEPHONE ENCOUNTER
[facility]    Pt declines to establish care with our clinic until he returns from vacation.  Explained we would like to reduce risk of stroke by starting OAC ASAP, but again pt declines. Pt does not wish to make apt at this time and states he will call the clinic in July.     Pavel Petit, PharmD

## 2023-06-14 ENCOUNTER — NON-PROVIDER VISIT (OUTPATIENT)
Dept: CARDIOLOGY | Facility: MEDICAL CENTER | Age: 73
End: 2023-06-14
Attending: INTERNAL MEDICINE
Payer: MEDICARE

## 2023-06-14 ENCOUNTER — NON-PROVIDER VISIT (OUTPATIENT)
Dept: CARDIOLOGY | Facility: MEDICAL CENTER | Age: 73
End: 2023-06-14

## 2023-06-14 DIAGNOSIS — Z95.0 CARDIAC PACEMAKER IN SITU: ICD-10-CM

## 2023-06-14 PROCEDURE — 93279 PRGRMG DEV EVAL PM/LDLS PM: CPT | Mod: 26 | Performed by: INTERNAL MEDICINE

## 2023-06-14 PROCEDURE — 93279 PRGRMG DEV EVAL PM/LDLS PM: CPT | Performed by: INTERNAL MEDICINE

## 2023-06-20 NOTE — CARDIAC REMOTE MONITOR - SCAN
Device transmission reviewed. Device demonstrated appropriate function.       Electronically Signed by: Marco Britt M.D.    6/20/2023  3:32 PM

## 2023-06-26 ENCOUNTER — HOSPITAL ENCOUNTER (OUTPATIENT)
Dept: LAB | Facility: MEDICAL CENTER | Age: 73
End: 2023-06-26
Attending: INTERNAL MEDICINE
Payer: MEDICARE

## 2023-06-26 DIAGNOSIS — Z79.899 HIGH RISK MEDICATION USE: ICD-10-CM

## 2023-06-26 DIAGNOSIS — R06.09 DYSPNEA ON EXERTION: ICD-10-CM

## 2023-06-26 DIAGNOSIS — I51.89 LEFT VENTRICULAR DIASTOLIC DYSFUNCTION, NYHA CLASS 3: ICD-10-CM

## 2023-06-26 DIAGNOSIS — I50.20 ACC/AHA STAGE C SYSTOLIC CONGESTIVE HEART FAILURE (HCC): ICD-10-CM

## 2023-06-26 LAB
ANION GAP SERPL CALC-SCNC: 15 MMOL/L (ref 7–16)
BUN SERPL-MCNC: 52 MG/DL (ref 8–22)
CALCIUM SERPL-MCNC: 9.9 MG/DL (ref 8.5–10.5)
CHLORIDE SERPL-SCNC: 102 MMOL/L (ref 96–112)
CO2 SERPL-SCNC: 23 MMOL/L (ref 20–33)
CREAT SERPL-MCNC: 2.36 MG/DL (ref 0.5–1.4)
GFR SERPLBLD CREATININE-BSD FMLA CKD-EPI: 28 ML/MIN/1.73 M 2
GLUCOSE SERPL-MCNC: 122 MG/DL (ref 65–99)
NT-PROBNP SERPL IA-MCNC: 8161 PG/ML (ref 0–125)
POTASSIUM SERPL-SCNC: 3.7 MMOL/L (ref 3.6–5.5)
SODIUM SERPL-SCNC: 140 MMOL/L (ref 135–145)

## 2023-06-26 PROCEDURE — 36415 COLL VENOUS BLD VENIPUNCTURE: CPT

## 2023-06-26 PROCEDURE — 80048 BASIC METABOLIC PNL TOTAL CA: CPT

## 2023-06-26 PROCEDURE — 83880 ASSAY OF NATRIURETIC PEPTIDE: CPT

## 2023-07-03 ENCOUNTER — APPOINTMENT (OUTPATIENT)
Dept: CARDIOLOGY | Facility: MEDICAL CENTER | Age: 73
End: 2023-07-03
Attending: INTERNAL MEDICINE
Payer: MEDICARE

## 2023-07-05 ENCOUNTER — OFFICE VISIT (OUTPATIENT)
Dept: CARDIOLOGY | Facility: MEDICAL CENTER | Age: 73
End: 2023-07-05
Attending: INTERNAL MEDICINE
Payer: MEDICARE

## 2023-07-05 VITALS
HEIGHT: 70 IN | BODY MASS INDEX: 30.85 KG/M2 | SYSTOLIC BLOOD PRESSURE: 138 MMHG | DIASTOLIC BLOOD PRESSURE: 80 MMHG | RESPIRATION RATE: 17 BRPM | WEIGHT: 215.5 LBS | HEART RATE: 64 BPM | OXYGEN SATURATION: 96 %

## 2023-07-05 DIAGNOSIS — I50.30 ACC/AHA STAGE C HEART FAILURE WITH PRESERVED EJECTION FRACTION (HCC): ICD-10-CM

## 2023-07-05 ASSESSMENT — FIBROSIS 4 INDEX: FIB4 SCORE: 0.95

## 2023-07-06 ENCOUNTER — TELEPHONE (OUTPATIENT)
Dept: VASCULAR LAB | Facility: MEDICAL CENTER | Age: 73
End: 2023-07-06
Payer: MEDICARE

## 2023-07-06 NOTE — LETTER
July 6, 2023        Zander Stewart  [address] [address]        Dear Zander Stewart ,    We have been unsuccessful in our attempts to contact you regarding your Anticoagulation Service referral.      Please contact our clinic so we may assist you.  We are open Monday-Friday 8 am until 5 pm.  You may reach our Service at [phone].          Sincerely,        Pavel Petit PharmD, [facility]  Pharmacy Clinical Supervisor  [facility]  Outpatient Anticoagulation Service

## 2023-07-06 NOTE — TELEPHONE ENCOUNTER
"[facility] Pharmac[facility]     Received anticoagulation referral from  To on 5/25/23     Per tele note 5/25: \"Will refer to anticoagulation for starting Warfarin.\"      Although, Eliquis 2.5mg bid may also be appropriate despite ESRD     5th call to establish care -   Pt has not responded to multiple attempts/calls to establish care. Will send letter & await pt contact.    Insurance: Medicare  PCP: non-[facility]   Locations to be seen: MAIN     Phone number left for return call or any questions or concerns.  [facility] at [phone], fax [phone]     Radha Patel, PharmD     "

## 2023-07-24 ENCOUNTER — HOSPITAL ENCOUNTER (OUTPATIENT)
Dept: LAB | Facility: MEDICAL CENTER | Age: 73
End: 2023-07-24
Attending: PHYSICIAN ASSISTANT
Payer: MEDICARE

## 2023-07-24 PROCEDURE — 36415 COLL VENOUS BLD VENIPUNCTURE: CPT

## 2023-07-24 PROCEDURE — 84402 ASSAY OF FREE TESTOSTERONE: CPT

## 2023-07-24 PROCEDURE — 84403 ASSAY OF TOTAL TESTOSTERONE: CPT

## 2023-07-24 PROCEDURE — 84270 ASSAY OF SEX HORMONE GLOBUL: CPT | Mod: 91

## 2023-07-26 LAB
TESTOST FREE MFR SERPL: 1.5 % (ref 1.6–2.9)
TESTOST FREE SERPL-MCNC: 50 PG/ML (ref 47–244)
TESTOST SERPL-MCNC: 332 NG/DL (ref 300–720)

## 2023-07-30 LAB
SHBG SERPL-SCNC: 45 NMOL/L (ref 19–76)
TESTOST FREE SERPL-MCNC: 44.1 PG/ML (ref 47–244)
TESTOST SERPL-MCNC: 295 NG/DL (ref 300–720)
TESTOSTERONE.FREE+WB SERPL-MCNC: 122.9 NG/DL (ref 130–680)

## 2023-08-22 ENCOUNTER — NON-PROVIDER VISIT (OUTPATIENT)
Dept: CARDIOLOGY | Facility: MEDICAL CENTER | Age: 73
End: 2023-08-22
Payer: MEDICARE

## 2023-08-22 PROCEDURE — 93294 REM INTERROG EVL PM/LDLS PM: CPT | Performed by: INTERNAL MEDICINE

## 2023-08-23 NOTE — CARDIAC REMOTE MONITOR - SCAN
Device transmission reviewed. Device demonstrated appropriate function.       Electronically Signed by: Grisel Escalante M.D.    8/28/2023  3:32 PM

## 2023-08-27 DIAGNOSIS — I10 ESSENTIAL HYPERTENSION: Chronic | ICD-10-CM

## 2023-08-28 DIAGNOSIS — I25.10 CORONARY ARTERY DISEASE, OCCLUSIVE: ICD-10-CM

## 2023-08-28 NOTE — TELEPHONE ENCOUNTER
Is the patient due for a refill? Yes    Was the patient seen the past year? Yes    Date of last office visit: 7/5/23    Does the patient have an upcoming appointment?  Yes   If yes, When? 9/26/23    Provider to refill:BD    Does the patients insurance require a 100 day supply?  No

## 2023-08-29 RX ORDER — METOLAZONE 2.5 MG/1
2.5 TABLET ORAL
Qty: 90 TABLET | Refills: 3 | Status: SHIPPED | OUTPATIENT
Start: 2023-08-29 | End: 2023-09-26 | Stop reason: SDUPTHER

## 2023-09-19 ENCOUNTER — TELEPHONE (OUTPATIENT)
Dept: CARDIOLOGY | Facility: MEDICAL CENTER | Age: 73
End: 2023-09-19
Payer: MEDICARE

## 2023-09-20 NOTE — TELEPHONE ENCOUNTER
Received letter in the mail from pt regarding lipid profile labs that were sent out for REC asking why he was ordered this by SW, even though he has been seen in office more recently by BD and TT. Scanned letter in to [employer].     Called pt back [phone] and left detailed message for pt explaining why the lipid profile was ordered and to please complete at earliest convenience or he can call us back if he has further questions. Apologized to pt that there was no letter accompanying the lab slip, since this caused confusion for him, but explained that a Mychart msg had been sent out to him explaining it, but that he had not read it.

## 2023-09-26 ENCOUNTER — OFFICE VISIT (OUTPATIENT)
Dept: CARDIOLOGY | Facility: MEDICAL CENTER | Age: 73
End: 2023-09-26
Attending: INTERNAL MEDICINE
Payer: MEDICARE

## 2023-09-26 VITALS
HEIGHT: 70 IN | BODY MASS INDEX: 31.21 KG/M2 | SYSTOLIC BLOOD PRESSURE: 128 MMHG | WEIGHT: 218 LBS | HEART RATE: 99 BPM | OXYGEN SATURATION: 96 % | RESPIRATION RATE: 16 BRPM | DIASTOLIC BLOOD PRESSURE: 80 MMHG

## 2023-09-26 DIAGNOSIS — N18.4 CKD (CHRONIC KIDNEY DISEASE), STAGE IV (HCC): ICD-10-CM

## 2023-09-26 DIAGNOSIS — Z95.0 CARDIAC PACEMAKER IN SITU: ICD-10-CM

## 2023-09-26 DIAGNOSIS — R06.09 DYSPNEA ON EXERTION: ICD-10-CM

## 2023-09-26 DIAGNOSIS — I51.89 LEFT VENTRICULAR DIASTOLIC DYSFUNCTION, NYHA CLASS 3: ICD-10-CM

## 2023-09-26 DIAGNOSIS — I50.20 ACC/AHA STAGE C SYSTOLIC CONGESTIVE HEART FAILURE (HCC): ICD-10-CM

## 2023-09-26 DIAGNOSIS — Z98.890 S/P TRICUSPID VALVE REPAIR: ICD-10-CM

## 2023-09-26 DIAGNOSIS — Z95.1 S/P CABG (CORONARY ARTERY BYPASS GRAFT): ICD-10-CM

## 2023-09-26 DIAGNOSIS — I48.11 LONGSTANDING PERSISTENT ATRIAL FIBRILLATION (HCC): ICD-10-CM

## 2023-09-26 DIAGNOSIS — I10 HTN (HYPERTENSION), MALIGNANT: ICD-10-CM

## 2023-09-26 PROCEDURE — 93005 ELECTROCARDIOGRAM TRACING: CPT | Performed by: INTERNAL MEDICINE

## 2023-09-26 PROCEDURE — 99212 OFFICE O/P EST SF 10 MIN: CPT | Performed by: INTERNAL MEDICINE

## 2023-09-26 PROCEDURE — 3074F SYST BP LT 130 MM HG: CPT | Performed by: INTERNAL MEDICINE

## 2023-09-26 PROCEDURE — 99215 OFFICE O/P EST HI 40 MIN: CPT | Mod: 25 | Performed by: INTERNAL MEDICINE

## 2023-09-26 PROCEDURE — 3079F DIAST BP 80-89 MM HG: CPT | Performed by: INTERNAL MEDICINE

## 2023-09-26 PROCEDURE — 99214 OFFICE O/P EST MOD 30 MIN: CPT | Performed by: INTERNAL MEDICINE

## 2023-09-26 RX ORDER — TORSEMIDE 20 MG/1
40 TABLET ORAL 2 TIMES DAILY
Qty: 360 TABLET | Refills: 3 | Status: SHIPPED | OUTPATIENT
Start: 2023-09-26

## 2023-09-26 RX ORDER — METOLAZONE 2.5 MG/1
2.5 TABLET ORAL
Qty: 90 TABLET | Refills: 3 | Status: SHIPPED | OUTPATIENT
Start: 2023-09-26

## 2023-09-26 ASSESSMENT — MINNESOTA LIVING WITH HEART FAILURE QUESTIONNAIRE (MLHF)
COSTING YOU MONEY FOR MEDICAL CARE: 2
HAVING TO SIT OR LIE DOWN DURING THE DAY: 2
SWELLING IN ANKLES OR LEGS: 3
DIFFICULTY WITH RECREATIONAL PASTIMES, SPORTS, HOBBIES: 4
GIVING YOU SIDE EFFECTS FROM TREATMENTS: 1
DIFFICULTY WORKING TO EARN A LIVING: 0
MAKING YOU STAY IN A HOSPITAL: 0
DIFFICULTY WITH SEXUAL ACTIVITIES: 3
EATING LESS FOODS YOU LIKE: 3
MAKING YOU FEEL DEPRESSED: 1
FEELING LIKE A BURDEN TO FAMILY AND FRIENDS: 0
TIRED, FATIGUED OR LOW ON ENERGY: 4
DIFFICULTY SLEEPING WELL AT NIGHT: 1
DIFFICULTY SOCIALIZING WITH FAMILY OR FRIENDS: 1
TOTAL_SCORE: 43
DIFFICULTY TO CONCENTRATE OR REMEMBERING THINGS: 0
MAKING YOU SHORT OF BREATH: 4
WORKING AROUND THE HOUSE OR YARD DIFFICULT: 3
DIFFICULTY GOING AWAY FROM HOME: 3
MAKING YOU WORRY: 2
LOSS OF SELF CONTROL IN YOUR LIFE: 2
WALKING ABOUT OR CLIMBING STAIRS DIFFICULT: 4

## 2023-09-26 ASSESSMENT — FIBROSIS 4 INDEX: FIB4 SCORE: 0.95

## 2023-09-26 ASSESSMENT — ENCOUNTER SYMPTOMS
MEMORY LOSS: 0
DEPRESSION: 0
DIZZINESS: 0
SHORTNESS OF BREATH: 1
BRUISES/BLEEDS EASILY: 0
COUGH: 0
BLURRED VISION: 0
FALLS: 0
FEVER: 0
ABDOMINAL PAIN: 0
MYALGIAS: 0
PALPITATIONS: 0
HEADACHES: 0
DIAPHORESIS: 0
DOUBLE VISION: 0
SENSORY CHANGE: 0

## 2023-09-26 NOTE — PROGRESS NOTES
Chief Complaint   Patient presents with    Atrial Fibrillation    Other     F/V Dx: ACC/AHA stage C heart failure with reduced ejection fraction (HCC)       Subjective     Maury Stewart is a 73 y.o. male who presents today for cardiac care and management in heart failure clinic due to recent reduction in LVEF, heart failure with reduced ejection fraction, NYHA class III, valvular disease status post repair of mitral valve and tricuspid valve, permanent atrial fibrillation, status post permanent pacemaker placement, CAD s/p CABG LIMA to LAD.    I personally interpreted the images of his recent transthoracic echocardiogram which show LV function of 35%, moderate aortic regurgitation, mild mitral regurgitation, estimated RVSP of 40 mmHg, known mitral repair which is functioning properly.  This was done in March 2023.    I personally interpreted the blood test result which showed GFR of 28, potassium of 3.7. NT pro BNP of 8161.    Patient still gets winded with daily living activities and exertion. No symptoms at rest.    Was not able to afford Entresto and Jardiance. Also no Entresto and Spironolactone due to reduced GFR.    No significant reflux seen in B GSVs.    Past Medical History:   Diagnosis Date    Arthritis 04/23/2021    Osteo- Fingers/Hands    Atrial fibrillation (HCC)     Atrial flutter (HCC)     Breath shortness 04/23/2021    In the past, is a current problem frequently    Chickenpox     Chronic anticoagulation     Heart burn 04/23/2021    GERD    Polycystic kidney, unspecified type     Pure hypercholesterolemia     Sleep apnea     on oxygen, unable to tolerate CPAP.    Unspecified essential hypertension     Unspecified sleep apnea      Past Surgical History:   Procedure Laterality Date    TONSILLECTOMY  1982    APPENDECTOMY  1966    APPENDECTOMY      OTHER      Cardiac Cath    OTHER      Kidney Surgery    OTHER      Nose Surgery    OTHER      Tonsillectomy with Adenoidectomy     Family History    Problem Relation Age of Onset    Heart Disease Neg Hx      Social History     Socioeconomic History    Marital status:      Spouse name: Not on file    Number of children: Not on file    Years of education: Not on file    Highest education level: Not on file   Occupational History    Not on file   Tobacco Use    Smoking status: Every Day     Current packs/day: 1.00     Types: Cigars, Cigarettes    Smokeless tobacco: Never   Vaping Use    Vaping Use: Never used   Substance and Sexual Activity    Alcohol use: Yes     Comment: rarely    Drug use: No    Sexual activity: Not on file   Other Topics Concern    Not on file   Social History Narrative    Not on file     Social Determinants of Health     Financial Resource Strain: Not on file   Food Insecurity: Not on file   Transportation Needs: Not on file   Physical Activity: Not on file   Stress: Not on file   Social Connections: Not on file   Intimate Partner Violence: Not on file   Housing Stability: Not on file     No Known Allergies  Outpatient Encounter Medications as of 9/26/2023   Medication Sig Dispense Refill    torsemide (DEMADEX) 20 MG Tab Take 2 Tablets by mouth 2 times a day. 360 Tablet 3    metOLazone (ZAROXOLYN) 2.5 MG Tab Take 1 Tablet by mouth every day. 90 Tablet 3    metoprolol SR (TOPROL XL) 25 MG TABLET SR 24 HR Take 1 Tablet by mouth every day. 90 Tablet 3    aspirin 81 MG EC tablet Take 81 mg by mouth every day.      omeprazole (PRILOSEC) 20 MG CPDR Take 20 mg by mouth 2 times a day.      zolpidem (AMBIEN) 10 MG TABS Take 10 mg by mouth at bedtime as needed.      [DISCONTINUED] metOLazone (ZAROXOLYN) 2.5 MG Tab TAKE ONE TABLET BY MOUTH EVERY DAY 90 Tablet 3    [DISCONTINUED] Empagliflozin (JARDIANCE) 10 MG Tab tablet Take 1 Tablet by mouth every day. (Patient not taking: Reported on 9/26/2023) 90 Tablet 3    [DISCONTINUED] methylPREDNISolone (MEDROL DOSEPAK) 4 MG Tablet Therapy Pack Follow schedule on package instructions. (Patient not  "taking: Reported on 9/26/2023) 21 Tablet 0    [DISCONTINUED] torsemide (DEMADEX) 20 MG Tab Take 2 Tablets by mouth 2 times a day. 360 Tablet 3     No facility-administered encounter medications on file as of 9/26/2023.     Review of Systems   Constitutional:  Negative for diaphoresis and fever.   HENT:  Negative for nosebleeds.    Eyes:  Negative for blurred vision and double vision.   Respiratory:  Positive for shortness of breath. Negative for cough.    Cardiovascular:  Positive for leg swelling. Negative for chest pain and palpitations.   Gastrointestinal:  Negative for abdominal pain.   Genitourinary:  Negative for dysuria and frequency.   Musculoskeletal:  Negative for falls and myalgias.   Skin:  Negative for rash.   Neurological:  Negative for dizziness, sensory change and headaches.   Endo/Heme/Allergies:  Does not bruise/bleed easily.   Psychiatric/Behavioral:  Negative for depression and memory loss.               Objective     /80 (BP Location: Left arm, Patient Position: Sitting, BP Cuff Size: Adult)   Pulse 99   Resp 16   Ht 1.778 m (5' 10\")   Wt 98.9 kg (218 lb)   SpO2 96%   BMI 31.28 kg/m²     Physical Exam  Vitals and nursing note reviewed.   Constitutional:       General: He is not in acute distress.     Appearance: He is not diaphoretic.   HENT:      Head: Normocephalic and atraumatic.      Right Ear: External ear normal.      Left Ear: External ear normal.      Nose: No congestion or rhinorrhea.   Eyes:      General:         Right eye: No discharge.         Left eye: No discharge.   Neck:      Thyroid: No thyromegaly.      Vascular: No JVD.   Cardiovascular:      Rate and Rhythm: Normal rate and regular rhythm.      Pulses: Normal pulses.   Pulmonary:      Effort: No respiratory distress.   Abdominal:      General: There is no distension.      Tenderness: There is no abdominal tenderness.   Musculoskeletal:         General: No swelling or tenderness.      Right lower leg: Edema " present.      Left lower leg: Edema present.   Skin:     General: Skin is warm and dry.   Neurological:      Mental Status: He is alert and oriented to person, place, and time.      Cranial Nerves: No cranial nerve deficit.   Psychiatric:         Behavior: Behavior normal.                Assessment & Plan     1. ACC/AHA stage C systolic congestive heart failure (HCC)  proBrain Natriuretic Peptide, NT    EC-ECHOCARDIOGRAM COMPLETE W/O CONT    Referral to Cardiac Rehab    Basic Metabolic Panel      2. Left ventricular diastolic dysfunction, NYHA class 3  proBrain Natriuretic Peptide, NT    EC-ECHOCARDIOGRAM COMPLETE W/O CONT    Referral to Cardiac Rehab    Basic Metabolic Panel      3. Longstanding persistent atrial fibrillation (HCC)  EKG      4. S/P CABG (coronary artery bypass graft)  Referral to Cardiac Rehab      5. Cardiac pacemaker in situ        6. CKD (chronic kidney disease), stage IV (HCC)        7. S/P tricuspid valve repair        8. Dyspnea on exertion  proBrain Natriuretic Peptide, NT    EC-ECHOCARDIOGRAM COMPLETE W/O CONT    Basic Metabolic Panel      9. HTN (hypertension), malignant  torsemide (DEMADEX) 20 MG Tab    metOLazone (ZAROXOLYN) 2.5 MG Tab          Medical Decision Making: Today's Assessment/Status/Plan:   Dilated Cardiomyopathy, LVEF of 35%:  Chronically illed condition which requires ongoing close monitoring and treatment to improve survival rate along with decreasing risk of clinical decompensation sudden cardiac death and hospitalization.    Today, based on physical examination findings, patient is euvolemic. No JVD, lungs are clear to auscultation, no pitting edema in bilateral lower extremities, no ascites.     Dry weight is 218 lbs.    Due to low GFR, ARNI, ARB, ACE-I and Spironolactone are all contraindicated.     Will continue Metoprolol SR (Toprol XL) at 25 mg po daily.     Diuretic with Torsemide 40 mg 2x daily along with Metolazone 2.5 mg daily.    Patient would like to defer  Cardiomems.     We will refer patient to ICD upgrade if LVEF remains less than or equal to 35%.    WILL REFER TO RESEARCH TRIAL FOR VERICIQUAT.    Coronary arterial disease s/p CABG SEAMAN to LAD:  Continue asa, BB, and statin at current dose.    Persistent atrial fibrillation:  No anticoagulation via s/p JANINE excision.  Will see EP in future for rhythm control.    Hypertension:  Optimize control using cardiomyopathy medical regimen as well.    Hyperlipidemia:  Optimize statin as within guidelines of CAD treatment as above.     Follow up with nephrology as well.    Of note, during the care of this patient, I spent a significant amount of time explaining the nature of the disease process, reviewing all possible imaging studies, blood test results to patient.  The overall care of this patient require a higher level of care than usual due to the multiple comorbidities along with ongoing issues of congestive heart failure that put this patient at risk for sudden cardiac death, increased mortality, and hospitalization.  This patient also requires close monitoring with at least monthly blood test to monitor renal function and electrolytes due to the ongoing dynamic changes of medical therapy titration protocol to ensure optimal benefits for the overall care of this patient.    Angelica Curry M.D.

## 2023-09-27 ENCOUNTER — HOSPITAL ENCOUNTER (OUTPATIENT)
Dept: CARDIOLOGY | Facility: MEDICAL CENTER | Age: 73
End: 2023-09-27
Attending: INTERNAL MEDICINE
Payer: MEDICARE

## 2023-09-27 DIAGNOSIS — I50.20 ACC/AHA STAGE C SYSTOLIC CONGESTIVE HEART FAILURE (HCC): ICD-10-CM

## 2023-09-27 DIAGNOSIS — R06.09 DYSPNEA ON EXERTION: ICD-10-CM

## 2023-09-27 DIAGNOSIS — I51.89 LEFT VENTRICULAR DIASTOLIC DYSFUNCTION, NYHA CLASS 3: ICD-10-CM

## 2023-09-27 LAB
EKG IMPRESSION: NORMAL
LV EJECT FRACT  99904: 40

## 2023-09-27 PROCEDURE — 93306 TTE W/DOPPLER COMPLETE: CPT | Mod: 26 | Performed by: INTERNAL MEDICINE

## 2023-09-27 PROCEDURE — 93306 TTE W/DOPPLER COMPLETE: CPT

## 2023-09-27 PROCEDURE — 93010 ELECTROCARDIOGRAM REPORT: CPT | Performed by: INTERNAL MEDICINE

## 2023-10-10 ENCOUNTER — TELEPHONE (OUTPATIENT)
Dept: CARDIOLOGY | Facility: MEDICAL CENTER | Age: 73
End: 2023-10-10
Payer: MEDICARE

## 2023-10-10 NOTE — TELEPHONE ENCOUNTER
To TT: Patient calling in regards to being referred for clinical trial for Vericiguat and wanting to follow up. Referral mentioned in OV but no referral noted. Please advise what to tell the patient. Thank you!

## 2023-10-10 NOTE — TELEPHONE ENCOUNTER
TT    Caller: Maury Stewart    Topic/issue: Patient was told by TT at last appointment 09- they he would be contacted for about a Drug Trial for the  VERICIQUAT medication. He would appreciated a call back in regards to this.     Callback Number: [phone]    Thank you,  -Neela WEAVER

## 2023-10-24 NOTE — TELEPHONE ENCOUNTER
Olivia Russell, Clinical Research Coordinator  Angelica Curry M.D.; Maribel Widle R.N. 1 hour ago (9:56 AM)     Hi,   I've left a couple of VM for him. Please tell him to call me back to schedule the Screening visit.   [phone] or [phone]   Thx   LEX Nunez Clinical Research Coordinator 18 hours ago (4:45 PM)     Reuben Baker,   Is he a candidate?     Angelica Curry M.D.    --------------------------------------------------------------------------------------------    LVM informing pt.

## 2023-10-31 ENCOUNTER — TELEPHONE (OUTPATIENT)
Dept: CARDIOLOGY | Facility: MEDICAL CENTER | Age: 73
End: 2023-10-31
Payer: MEDICARE

## 2023-10-31 DIAGNOSIS — I50.20 ACC/AHA STAGE C SYSTOLIC CONGESTIVE HEART FAILURE (HCC): ICD-10-CM

## 2023-11-15 ENCOUNTER — RESEARCH ENCOUNTER (OUTPATIENT)
Dept: CARDIOLOGY | Facility: MEDICAL CENTER | Age: 73
End: 2023-11-15
Attending: INTERNAL MEDICINE
Payer: MEDICARE

## 2023-11-15 ENCOUNTER — HOSPITAL ENCOUNTER (OUTPATIENT)
Dept: LAB | Facility: MEDICAL CENTER | Age: 73
End: 2023-11-15
Attending: INTERNAL MEDICINE
Payer: MEDICARE

## 2023-11-15 DIAGNOSIS — I50.20 ACC/AHA STAGE C SYSTOLIC CONGESTIVE HEART FAILURE (HCC): ICD-10-CM

## 2023-11-15 DIAGNOSIS — R06.09 DYSPNEA ON EXERTION: ICD-10-CM

## 2023-11-15 DIAGNOSIS — I51.89 LEFT VENTRICULAR DIASTOLIC DYSFUNCTION, NYHA CLASS 3: ICD-10-CM

## 2023-11-15 LAB
ALBUMIN SERPL BCP-MCNC: 4.5 G/DL (ref 3.2–4.9)
ALBUMIN/GLOB SERPL: 1.5 G/DL
ALP SERPL-CCNC: 163 U/L (ref 30–99)
ALT SERPL-CCNC: 8 U/L (ref 2–50)
ANION GAP SERPL CALC-SCNC: 13 MMOL/L (ref 7–16)
AST SERPL-CCNC: 13 U/L (ref 12–45)
BILIRUB SERPL-MCNC: 1.6 MG/DL (ref 0.1–1.5)
BUN SERPL-MCNC: 43 MG/DL (ref 8–22)
CALCIUM ALBUM COR SERPL-MCNC: 9.6 MG/DL (ref 8.5–10.5)
CALCIUM SERPL-MCNC: 10 MG/DL (ref 8.5–10.5)
CHLORIDE SERPL-SCNC: 106 MMOL/L (ref 96–112)
CO2 SERPL-SCNC: 22 MMOL/L (ref 20–33)
CREAT SERPL-MCNC: 2.44 MG/DL (ref 0.5–1.4)
GFR SERPLBLD CREATININE-BSD FMLA CKD-EPI: 27 ML/MIN/1.73 M 2
GLOBULIN SER CALC-MCNC: 3.1 G/DL (ref 1.9–3.5)
GLUCOSE SERPL-MCNC: 97 MG/DL (ref 65–99)
NT-PROBNP SERPL IA-MCNC: 9951 PG/ML (ref 0–125)
POTASSIUM SERPL-SCNC: 3.9 MMOL/L (ref 3.6–5.5)
PROT SERPL-MCNC: 7.6 G/DL (ref 6–8.2)
SODIUM SERPL-SCNC: 141 MMOL/L (ref 135–145)

## 2023-11-15 PROCEDURE — 83880 ASSAY OF NATRIURETIC PEPTIDE: CPT

## 2023-11-15 PROCEDURE — 36415 COLL VENOUS BLD VENIPUNCTURE: CPT

## 2023-11-15 PROCEDURE — 80053 COMPREHEN METABOLIC PANEL: CPT

## 2023-11-15 NOTE — RESEARCH NOTE
Name: Zander Stewart   MRN: [id number]  Participant ID:  [id number]  : 1950  Visit Date/Time: 11/15/2023 11:0 M      Study:    HAY [id number] - Hay [id number]   Status Consented/Enrolled (11/15/2023)   Active Start Date 11/15/23   Participant ID [id number]   Coordinator Olivia Russell Bridget I   NCT [id number]    Angelica Curry M.D.     Screening/Consent note:    Participation in the HAY clinical trial was discussed with patient today. All aspects of the study purpose and procedures were explained.  Subject was given ample time to review the Initial Screening consent and all questions were answered to their satisfaction. Patient aware that the clinical trial is voluntary and they may withdraw consent at any time without affecting the level of care they receive.  Subject signed Initial Screening consent form without coercion and undue influence and was given a copy of the signed consent. No study-related procedures took place prior to consenting and all assessments were conducted per protocol.    Lab slips given, patient will get drawn this week. If they meet study requirements, he will proceed with a Full Screening/Randomization visit.

## 2023-11-20 ENCOUNTER — RESEARCH ENCOUNTER (OUTPATIENT)
Dept: CARDIOLOGY | Facility: MEDICAL CENTER | Age: 73
End: 2023-11-20

## 2023-11-20 ENCOUNTER — OFFICE VISIT (OUTPATIENT)
Dept: CARDIOLOGY | Facility: MEDICAL CENTER | Age: 73
End: 2023-11-20
Attending: INTERNAL MEDICINE
Payer: MEDICARE

## 2023-11-20 VITALS
OXYGEN SATURATION: 98 % | HEIGHT: 70 IN | RESPIRATION RATE: 16 BRPM | WEIGHT: 211 LBS | HEART RATE: 90 BPM | SYSTOLIC BLOOD PRESSURE: 102 MMHG | BODY MASS INDEX: 30.21 KG/M2 | DIASTOLIC BLOOD PRESSURE: 64 MMHG

## 2023-11-20 DIAGNOSIS — I25.10 CORONARY ARTERY DISEASE, OCCLUSIVE: ICD-10-CM

## 2023-11-20 DIAGNOSIS — N18.4 CKD (CHRONIC KIDNEY DISEASE), STAGE IV (HCC): ICD-10-CM

## 2023-11-20 DIAGNOSIS — I50.20 ACC/AHA STAGE C SYSTOLIC CONGESTIVE HEART FAILURE (HCC): ICD-10-CM

## 2023-11-20 DIAGNOSIS — I48.11 LONGSTANDING PERSISTENT ATRIAL FIBRILLATION (HCC): ICD-10-CM

## 2023-11-20 DIAGNOSIS — Z98.890 S/P MVR (MITRAL VALVE REPAIR): ICD-10-CM

## 2023-11-20 DIAGNOSIS — Z98.890 S/P TRICUSPID VALVE REPAIR: ICD-10-CM

## 2023-11-20 DIAGNOSIS — I50.9 HEART FAILURE, NYHA CLASS 2 (HCC): ICD-10-CM

## 2023-11-20 DIAGNOSIS — I49.5 SICK SINUS SYNDROME (HCC): ICD-10-CM

## 2023-11-20 DIAGNOSIS — I10 HTN (HYPERTENSION), MALIGNANT: ICD-10-CM

## 2023-11-20 DIAGNOSIS — Z95.0 CARDIAC PACEMAKER IN SITU: ICD-10-CM

## 2023-11-20 DIAGNOSIS — Z95.1 S/P CABG (CORONARY ARTERY BYPASS GRAFT): ICD-10-CM

## 2023-11-20 PROCEDURE — 3078F DIAST BP <80 MM HG: CPT | Performed by: NURSE PRACTITIONER

## 2023-11-20 PROCEDURE — 99214 OFFICE O/P EST MOD 30 MIN: CPT | Performed by: NURSE PRACTITIONER

## 2023-11-20 PROCEDURE — 99212 OFFICE O/P EST SF 10 MIN: CPT | Performed by: NURSE PRACTITIONER

## 2023-11-20 PROCEDURE — 3074F SYST BP LT 130 MM HG: CPT | Performed by: NURSE PRACTITIONER

## 2023-11-20 RX ORDER — AMOXICILLIN 500 MG/1
500 CAPSULE ORAL 2 TIMES DAILY
COMMUNITY
Start: 2023-11-07

## 2023-11-20 ASSESSMENT — ENCOUNTER SYMPTOMS
PALPITATIONS: 0
SHORTNESS OF BREATH: 1
CLAUDICATION: 0
PND: 0
FEVER: 0
MYALGIAS: 0
COUGH: 0
ABDOMINAL PAIN: 0
DIZZINESS: 0
ORTHOPNEA: 0

## 2023-11-20 ASSESSMENT — FIBROSIS 4 INDEX
FIB4 SCORE: 1.38
FIB4 SCORE: 1.38

## 2023-11-20 NOTE — PROGRESS NOTES
Chief Complaint   Patient presents with    Congestive Heart Failure     F/V Dx: ACC/AHA stage C systolic congestive heart failure (HCC)       Subjective     Maury Stewart is a 73 y.o. male who presents today for HAY Trial screening visit.    Patient of Dr. Calderón.  He was last seen in clinic with Dr. Curry in the HF clinic on 9/26/2023.  During that visit, patient was sent for repeat echocardiogram, lab testing and screened for the Hay trial.    Patient reports he has been doing fairly well since his last visit.  He continues to mention some shortness of breath and swelling.  He also reports some irritation at his left chest device site.  He otherwise denies other chest pain, palpitations, orthopnea, PND or dizziness/lightheadedness.    He reports his home weights have been ranging between 206 and 213 pounds.    He admits to having some difficulty monitoring his sodium intake, but does try.    He reports compliance with his medications.    Additionally, patient has the following medical problems:     -CAD, s/p CABG x1 LIMA to LAD, s/p MV repair, TV repair, MAZE, JANINE excision on 3/25/2022    -Persistent atrial fibrillation: Not on OAC as he is s/p JANINE excision    -Hypertension    -Hyperlipidemia    -CKD stage IV, polycystic kidney disease    -Has PPM    -HOWARD, intolerant to CPAP    Past Medical History:   Diagnosis Date    Arthritis 04/23/2021    Osteo- Fingers/Hands    Atrial fibrillation (HCC)     Atrial flutter (HCC)     Breath shortness 04/23/2021    In the past, is a current problem frequently    Chickenpox     Chronic anticoagulation     Heart burn 04/23/2021    GERD    Polycystic kidney, unspecified type     Pure hypercholesterolemia     Sleep apnea     on oxygen, unable to tolerate CPAP.    Unspecified essential hypertension     Unspecified sleep apnea      Past Surgical History:   Procedure Laterality Date    TONSILLECTOMY  1982    APPENDECTOMY  1966    APPENDECTOMY      OTHER      Cardiac Cath     OTHER      Kidney Surgery    OTHER      Nose Surgery    OTHER      Tonsillectomy with Adenoidectomy     Family History   Problem Relation Age of Onset    Heart Disease Neg Hx      Social History     Socioeconomic History    Marital status:      Spouse name: Not on file    Number of children: Not on file    Years of education: Not on file    Highest education level: Not on file   Occupational History    Not on file   Tobacco Use    Smoking status: Every Day     Current packs/day: 1.00     Types: Cigars, Cigarettes    Smokeless tobacco: Never   Vaping Use    Vaping Use: Never used   Substance and Sexual Activity    Alcohol use: Yes     Comment: rarely    Drug use: No    Sexual activity: Not on file   Other Topics Concern    Not on file   Social History Narrative    Not on file     Social Determinants of Health     Financial Resource Strain: Not on file   Food Insecurity: Not on file   Transportation Needs: Not on file   Physical Activity: Not on file   Stress: Not on file   Social Connections: Not on file   Intimate Partner Violence: Not on file   Housing Stability: Not on file     No Known Allergies  Outpatient Encounter Medications as of 11/20/2023   Medication Sig Dispense Refill    amoxicillin (AMOXIL) 500 MG Cap Take 500 mg by mouth 2 times a day.      torsemide (DEMADEX) 20 MG Tab Take 2 Tablets by mouth 2 times a day. 360 Tablet 3    metOLazone (ZAROXOLYN) 2.5 MG Tab Take 1 Tablet by mouth every day. 90 Tablet 3    metoprolol SR (TOPROL XL) 25 MG TABLET SR 24 HR Take 1 Tablet by mouth every day. 90 Tablet 3    aspirin 81 MG EC tablet Take 81 mg by mouth every day.      omeprazole (PRILOSEC) 20 MG CPDR Take 20 mg by mouth 2 times a day.      zolpidem (AMBIEN) 10 MG TABS Take 10 mg by mouth at bedtime as needed.       No facility-administered encounter medications on file as of 11/20/2023.     Review of Systems   Constitutional:  Negative for fever and malaise/fatigue.   Respiratory:  Positive for  "shortness of breath (occ). Negative for cough.    Cardiovascular:  Positive for leg swelling. Negative for chest pain (irritation at device site.), palpitations, orthopnea, claudication and PND.   Gastrointestinal:  Negative for abdominal pain.   Musculoskeletal:  Negative for myalgias.   Neurological:  Negative for dizziness.   All other systems reviewed and are negative.             Objective     /64 (BP Location: Left arm, Patient Position: Sitting, BP Cuff Size: Adult)   Pulse 90   Resp 16   Ht 1.778 m (5' 10\")   Wt 95.7 kg (211 lb)   SpO2 98%   BMI 30.28 kg/m²     Physical Exam  Vitals reviewed.   Constitutional:       Appearance: He is well-developed.   HENT:      Head: Normocephalic and atraumatic.   Eyes:      Pupils: Pupils are equal, round, and reactive to light.   Neck:      Vascular: No JVD.   Cardiovascular:      Rate and Rhythm: Normal rate and regular rhythm.      Heart sounds: Normal heart sounds.   Pulmonary:      Effort: Pulmonary effort is normal. No respiratory distress.      Breath sounds: Normal breath sounds. No wheezing or rales.   Abdominal:      General: Bowel sounds are normal.      Palpations: Abdomen is soft.   Musculoskeletal:         General: Normal range of motion.      Cervical back: Normal range of motion and neck supple.      Right lower le+ Pitting Edema present.      Left lower le+ Pitting Edema present.   Skin:     General: Skin is warm and dry.   Neurological:      General: No focal deficit present.      Mental Status: He is alert and oriented to person, place, and time.   Psychiatric:         Behavior: Behavior normal.       Lab Results   Component Value Date/Time    CHOLSTRLTOT 153 2022 09:07 AM    LDL 94 2022 09:07 AM    HDL 43 2022 09:07 AM    TRIGLYCERIDE 78 2022 09:07 AM       Lab Results   Component Value Date/Time    SODIUM 141 11/15/2023 02:56 PM    POTASSIUM 3.9 11/15/2023 02:56 PM    CHLORIDE 106 11/15/2023 02:56 PM    CO2 " 22 11/15/2023 02:56 PM    GLUCOSE 97 11/15/2023 02:56 PM    BUN 43 (H) 11/15/2023 02:56 PM    CREATININE 2.44 (H) 11/15/2023 02:56 PM    BUNCREATRAT 13 09/22/2020 08:59 AM     Lab Results   Component Value Date/Time    ALKPHOSPHAT 163 (H) 11/15/2023 02:56 PM    ASTSGOT 13 11/15/2023 02:56 PM    ALTSGPT 8 11/15/2023 02:56 PM    TBILIRUBIN 1.6 (H) 11/15/2023 02:56 PM       Transthoracic Echo Report 9/2/2020  No prior study is available for comparison.   Normal left ventricular chamber size.  Mildly reduced left ventricular systolic function.  Left ventricular ejection fraction is visually estimated to be 45-50%.  Aortic sclerosis without stenosis.  Mild mitral regurgitation.  Normal right ventricular size and systolic function.  Right heart pressures are normal.  Severely dilated left atrium.  Rythym is atrial fibrillation.    Transthoracic Echo Report 12/8/2020  Compared to the images of the study done 9-2-2020 mitral regurgitation appears more severe.Mildly reduced left ventricular systolic function.  Left ventricular ejection fraction is visually estimated to be 45-50%.  Aortic sclerosis without stenosis.  Mild aortic insufficiency.  Severe mitral regurgitation.  Right heart pressures are consistent with moderate pulmonary   hypertension at 50 mmHg.  Normal right ventricular size and systolic function.  Severe biatrial enlargement.  Rhythm is atrial fibrillation.    Transesophageal Echo Report 4/27/2021  Mild to moderately reduced left ventricular systolic function.  Left ventricular ejection fraction is visually estimated to be 40-45%.  Mild aortic insufficiency.  Mild degenerative mitral valve leaftets.  Moderate mitral regurgitation, central with 2 jets.    Transthoracic Echo Report 12/8/2021  Normal left ventricular chamber size.  Mildly reduced left ventricular systolic function.  The left ventricular ejection fraction is visually estimated to be 50%;   variable due to atrial fibrillation.  Mild concentric  left ventricular hypertrophy.  Aortic sclerosis without stenosis.  Mild aortic insufficiency.  Moderate mitral regurgitation; possibly more severe.  Estimated right ventricular systolic pressure is 65 mmHg.  Dilated right ventricle with reduced right ventricular systolic   function.  Severe biatrial enlargement.  Rhythm is atrial fibrillation.  Compared to prior echo on 12/08/2020; no significant change.    Transthoracic Echo Report 3/6/2023  Normal left ventricular chamber size.  Moderately reduced left ventricular systolic function.  The left ventricular ejection fraction is visually estimated to be 35%.  Mild to moderate aortic insufficiency.  Known mitral valve repair which is functioning normally with   appropriate transvalvular gradient.  Mild mitral regurgitation.  Estimated right ventricular systolic pressure is 40 mmHg.  Dilated inferior vena cava with inspiratory collapse.  Severel biatrial enlargement..  The right ventricle is dilated.  Reduced right ventricular systolic function.  Compared to the images of the prior study on 12/08/2021 left   ventricular function is reduced; right ventricular function remains   dilated with reduced function.     Transthoracic Echo Report 9/27/2023  Compared to the prior study on 3/6/23, the RVSP has increased.  Moderately reduced left ventricular systolic function. The left   ventricular ejection fraction is visually estimated to be 40%.   Mild mitral regurgitation.  Moderate aortic insufficiency.  Estimated right ventricular systolic pressure is 60 mmHg.  Severely dilated right ventricle. Reduced right ventricular systolic function.  Severely dilated left atrium.  Normal aortic root for body surface area. The ascending aorta diameter is 3.7 cm.         Assessment & Plan     1. ACC/AHA stage C systolic congestive heart failure (HCC)        2. Heart failure, NYHA class 2 (HCC)        3. Longstanding persistent atrial fibrillation (HCC)        4. S/P CABG (coronary artery  bypass graft)        5. S/P tricuspid valve repair        6. HTN (hypertension), malignant        7. Coronary artery disease, occlusive        8. CKD (chronic kidney disease), stage IV (HCC)        9. S/P MVR (mitral valve repair)        10. Sick sinus syndrome (HCC)        11. Cardiac pacemaker in situ            Medical Decision Making: Today's Assessment/Status/Plan:        Screening/Consent note:    Participation in the PING clinical trial was discussed with patient today. All aspects of the study purpose and procedures were explained.  Subject was given ample time to review the consent and all questions were answered to their satisfaction. Patient aware that the clinical trial is voluntary and they may withdraw consent at any time without affecting the level of care they receive.  Subject signed consent without coercion and undue influence and was given a copy of the signed consent. No study-related procedures took place prior to consenting and all assessments were conducted per protocol.    Full physical exam conducted today.   NYHA 2    At this point, subject meets all study Inclusion and no exclusion criteria.   Patient will continue with any needed study assessments and return for Randomization visit if these meet study criteria.      Is subject currently taking an ACEi or ARB?  No: Contraindication  CKD    Is subject currently taking an MRA?  No: Contraindication CKD    Is subject currently taking sacubitril + valsartan?  No: Contraindication CKD    Is subject currently taking SGLT2i?  No: Contraindication CKD    Is subject currently taking a long acting nitrate plus hydralazine?  No: Not indicated by treatment guidelines    Is subject currently taking Ivabradine?  No: Not indicated by treatment guidelines    Is subject currently taking Omecamtiv Mecarbil?  No: Not indicated by treatment guidelines    Does the subject have an ICD?  No: Not indicated by treatment guidelines    Does the subject have a  biventricular pacemaker?  No: Other specify he does have a Pacemaker     Does the subject have an implanted pulmonary artery pressure monitor(CardioMEMS)?  No: Subject or Physician Preference     Patient does have a follow-up on 12/12/2023 for randomization.    Patient verbalizes understanding and agrees with the plan of care.     PLEASE NOTE: This Note was created using voice recognition Software. I have made every reasonable attempt to correct obvious errors, but I expect that there are errors of grammar and possibly content that I did not discover before finalizing the note

## 2023-11-21 VITALS
HEART RATE: 98 BPM | SYSTOLIC BLOOD PRESSURE: 138 MMHG | WEIGHT: 210.98 LBS | BODY MASS INDEX: 30.27 KG/M2 | DIASTOLIC BLOOD PRESSURE: 99 MMHG

## 2023-11-21 NOTE — RESEARCH NOTE
Name: Zander Stewart   MRN: [id number]  Participant ID:  [id number]  : 1950  Visit Date/Time: 2023 12:30PM      Study:    HAY [id number] - Hay [id number]   Status Consented/Enrolled (11/15/2023)   Active Start Date 11/15/23   Participant ID [id number]   Coordinator Olivia Russell Bridget I   NCT [id number]    Angelica Curry M.D.     Screening/Main Consent note:    Participation in the HAY clinical trial was discussed with patient today. Pt had signed the Targeted Screening Consent on 11/15/23. Labs drawn afterwards qualify to move forward with full consent.   All aspects of the study purpose and procedures were explained.  Subject was given ample time to review the consent and all questions were answered to their satisfaction. Patient aware that the clinical trial is voluntary and they may withdraw consent at any time without affecting the level of care they receive.  Subject signed both the Main Study consent and the Optional FBR consent without coercion and undue influence and was given a copy of the signed consents. No study-related procedures took place prior to consenting and all assessments were conducted per protocol.    ALIDA Jennings conducted study physical exam. See her note.    Med Hx and ConMeds reviewed.    Study Patient Identification Card given.      Vitals: OMRON BP taken right arm with pt seated quietly for 10 mins starting at 12:55pm  Average: /97 HR 97  .8cm    Vitals:    23 1305 23 1307 23 1309   BP: (!) 144/91 (!) 146/101 (!) 138/99   BP Location: Right arm     Patient Position: Sitting     Pulse: 98 94 98   Weight: 95.7 kg (210 lb 15.7 oz)         Labs:                  Recent Results (from the past 336 hour(s))   Comp Metabolic Panel    Collection Time: 11/15/23  2:56 PM   Result Value Ref Range    Sodium 141 135 - 145 mmol/L    Potassium 3.9 3.6 - 5.5 mmol/L    Chloride 106  96 - 112 mmol/L    Co2 22 20 - 33 mmol/L    Anion Gap 13.0 7.0 - 16.0    Glucose 97 65 - 99 mg/dL    Bun 43 (H) 8 - 22 mg/dL    Creatinine 2.44 (H) 0.50 - 1.40 mg/dL    Calcium 10.0 8.5 - 10.5 mg/dL    Correct Calcium 9.6 8.5 - 10.5 mg/dL    AST(SGOT) 13 12 - 45 U/L    ALT(SGPT) 8 2 - 50 U/L    Alkaline Phosphatase 163 (H) 30 - 99 U/L    Total Bilirubin 1.6 (H) 0.1 - 1.5 mg/dL    Albumin 4.5 3.2 - 4.9 g/dL    Total Protein 7.6 6.0 - 8.2 g/dL    Globulin 3.1 1.9 - 3.5 g/dL    A-G Ratio 1.5 g/dL   proBrain Natriuretic Peptide, NT    Collection Time: 11/15/23  2:56 PM   Result Value Ref Range    NT-proBNP 9951 (H) 0 - 125 pg/mL   ESTIMATED GFR    Collection Time: 11/15/23  2:56 PM   Result Value Ref Range    GFR (CKD-EPI) 27 (A) >60 mL/min/1.73 m 2         Meds:    Current Outpatient Medications   Medication Sig Dispense Refill    amoxicillin (AMOXIL) 500 MG Cap Take 500 mg by mouth 2 times a day.      torsemide (DEMADEX) 20 MG Tab Take 2 Tablets by mouth 2 times a day. 360 Tablet 3    metOLazone (ZAROXOLYN) 2.5 MG Tab Take 1 Tablet by mouth every day. 90 Tablet 3    metoprolol SR (TOPROL XL) 25 MG TABLET SR 24 HR Take 1 Tablet by mouth every day. 90 Tablet 3    aspirin 81 MG EC tablet Take 81 mg by mouth every day.      omeprazole (PRILOSEC) 20 MG CPDR Take 20 mg by mouth 2 times a day.      zolpidem (AMBIEN) 10 MG TABS Take 10 mg by mouth at bedtime as needed.       No current facility-administered medications for this visit.    current meds    Med Hx with start dates:  Hypertension 13/FEB/1997  Hyperlipidemia 3/FEB/1997  Polycystic Kidney disease 1995  Atrial fibrillation 2003  Atrial flutter 2009  Chronic Kidney Disease 1995  CABG 25/MAR/2022  MVR 25/MAR/2022  TVR  25/MAR/2022  PPM  01/APR/2022  CAD  25/MAR/2022  Sleep Apnea 2010  HFrEF 6/MAR/2023    Follow-up: Randomization Visit 12/12/23

## 2023-11-22 ENCOUNTER — NON-PROVIDER VISIT (OUTPATIENT)
Dept: CARDIOLOGY | Facility: MEDICAL CENTER | Age: 73
End: 2023-11-22
Payer: MEDICARE

## 2023-11-22 PROCEDURE — 93294 REM INTERROG EVL PM/LDLS PM: CPT | Performed by: INTERNAL MEDICINE

## 2023-11-22 NOTE — CARDIAC REMOTE MONITOR - SCAN
Device transmission reviewed. Device demonstrated appropriate function.       Electronically Signed by: Marco Britt M.D.    11/22/2023  11:15 AM

## 2023-11-29 ENCOUNTER — RESEARCH ENCOUNTER (OUTPATIENT)
Dept: CARDIOLOGY | Facility: MEDICAL CENTER | Age: 73
End: 2023-11-29
Payer: MEDICARE

## 2023-11-29 NOTE — RESEARCH NOTE
Name: Zander Stewart   MRN: [id number]  Participant ID:  [id number]  : 1950  Visit Date/Time: 2023 12:33 PM      Study:    HAY [id number] - Hay [id number]   Status Consented/Enrolled (11/15/2023)   Active Start Date 11/15/23   Participant ID [id number]   Coordinator Olivia Russell Bridget I   NCT [id number]    Angelica Curry M.D.     Study Note:    Spoke with Maury to let him know he does not qualify for the HAY study due to his NTproBNP drawn on 11/15/23 being above the study upper limits of 9000 pg/mL.     He will follow up with Cardiology as scheduled for SOC visits.    Labs:                  Recent Results (from the past 336 hour(s))   Comp Metabolic Panel    Collection Time: 11/15/23  2:56 PM   Result Value Ref Range    Sodium 141 135 - 145 mmol/L    Potassium 3.9 3.6 - 5.5 mmol/L    Chloride 106 96 - 112 mmol/L    Co2 22 20 - 33 mmol/L    Anion Gap 13.0 7.0 - 16.0    Glucose 97 65 - 99 mg/dL    Bun 43 (H) 8 - 22 mg/dL    Creatinine 2.44 (H) 0.50 - 1.40 mg/dL    Calcium 10.0 8.5 - 10.5 mg/dL    Correct Calcium 9.6 8.5 - 10.5 mg/dL    AST(SGOT) 13 12 - 45 U/L    ALT(SGPT) 8 2 - 50 U/L    Alkaline Phosphatase 163 (H) 30 - 99 U/L    Total Bilirubin 1.6 (H) 0.1 - 1.5 mg/dL    Albumin 4.5 3.2 - 4.9 g/dL    Total Protein 7.6 6.0 - 8.2 g/dL    Globulin 3.1 1.9 - 3.5 g/dL    A-G Ratio 1.5 g/dL   proBrain Natriuretic Peptide, NT    Collection Time: 11/15/23  2:56 PM   Result Value Ref Range    NT-proBNP 9951 (H) 0 - 125 pg/mL   ESTIMATED GFR    Collection Time: 11/15/23  2:56 PM   Result Value Ref Range    GFR (CKD-EPI) 27 (A) >60 mL/min/1.73 m 2

## 2024-02-22 ENCOUNTER — NON-PROVIDER VISIT (OUTPATIENT)
Dept: CARDIOLOGY | Facility: MEDICAL CENTER | Age: 74
End: 2024-02-22
Payer: MEDICARE

## 2024-02-22 PROCEDURE — 93294 REM INTERROG EVL PM/LDLS PM: CPT | Performed by: INTERNAL MEDICINE

## 2024-02-22 NOTE — CARDIAC REMOTE MONITOR - SCAN
Device transmission reviewed. Device demonstrated appropriate function.       Electronically Signed by: Marco Britt M.D.    2/23/2024  12:32 PM

## 2024-03-01 ENCOUNTER — HOSPITAL ENCOUNTER (OUTPATIENT)
Dept: LAB | Facility: MEDICAL CENTER | Age: 74
End: 2024-03-01
Attending: STUDENT IN AN ORGANIZED HEALTH CARE EDUCATION/TRAINING PROGRAM
Payer: MEDICARE

## 2024-03-01 LAB
HCT VFR BLD AUTO: 44.8 % (ref 42–52)
HGB BLD-MCNC: 14.6 G/DL (ref 14–18)

## 2024-03-01 PROCEDURE — 82671 ASSAY OF ESTROGENS: CPT

## 2024-03-01 PROCEDURE — 36415 COLL VENOUS BLD VENIPUNCTURE: CPT

## 2024-03-01 PROCEDURE — 85018 HEMOGLOBIN: CPT

## 2024-03-01 PROCEDURE — 85014 HEMATOCRIT: CPT

## 2024-03-01 PROCEDURE — 84403 ASSAY OF TOTAL TESTOSTERONE: CPT

## 2024-03-02 LAB — TESTOST SERPL-MCNC: 408 NG/DL (ref 175–781)

## 2024-03-06 LAB
ESTRADIOL SERPL HS-MCNC: 26.9 PG/ML (ref 10–42)
ESTROGEN SERPL CALC-MCNC: 62.2 PG/ML (ref 19–69)
ESTRONE SERPL-MCNC: 35.3 PG/ML (ref 9–36)

## 2024-03-11 DIAGNOSIS — I25.10 CORONARY ARTERY DISEASE, OCCLUSIVE: ICD-10-CM

## 2024-04-03 DIAGNOSIS — I25.10 CORONARY ARTERY DISEASE, OCCLUSIVE: ICD-10-CM

## 2024-04-24 DIAGNOSIS — I50.20 ACC/AHA STAGE C SYSTOLIC CONGESTIVE HEART FAILURE (HCC): ICD-10-CM

## 2024-04-24 RX ORDER — METOPROLOL SUCCINATE 25 MG/1
25 TABLET, EXTENDED RELEASE ORAL DAILY
Qty: 90 TABLET | Refills: 1 | Status: SHIPPED | OUTPATIENT
Start: 2024-04-24

## 2024-04-24 NOTE — TELEPHONE ENCOUNTER
MAUREEN  Received request via: Patient    Was the patient seen in the last year in this department? Yes, 11/20/2023    Does the patient have an active prescription (recently filled or refills available) for medication(s) requested? Yes. Refill request has been refused in [facility]. Contacted pharmacy and called in most recent prescription.    Pharmacy Name: [id number] - [place], [place] - [address] [[id number]]     Topic: Patient has two days of the medication left.    Does the patient have skilled nursing Plus and need 100 day supply (blood pressure, diabetes and cholesterol meds only)? Patient does not have SCP    Thank you,  Jaimie ESCAMILLA

## 2024-05-24 ENCOUNTER — TELEPHONE (OUTPATIENT)
Dept: CARDIOLOGY | Facility: MEDICAL CENTER | Age: 74
End: 2024-05-24
Payer: MEDICARE

## 2024-05-24 ENCOUNTER — NON-PROVIDER VISIT (OUTPATIENT)
Dept: CARDIOLOGY | Facility: MEDICAL CENTER | Age: 74
End: 2024-05-24
Payer: MEDICARE

## 2024-05-24 PROCEDURE — 93294 REM INTERROG EVL PM/LDLS PM: CPT | Performed by: INTERNAL MEDICINE

## 2024-05-24 NOTE — TELEPHONE ENCOUNTER
FYI - remote transmission received via home monitor, device shows 3 SVT episodes, EGM consistent w/ AF w/ RVR, longest episode was 40 seconds on 3/19/2024, full report scanned into Media.

## 2024-05-24 NOTE — CARDIAC REMOTE MONITOR - SCAN
Device transmission reviewed. Device demonstrated appropriate function.       Electronically Signed by: Drake Westbrook M.D.    5/28/2024  9:32 AM

## 2024-05-28 ENCOUNTER — APPOINTMENT (RX ONLY)
Dept: URBAN - METROPOLITAN AREA CLINIC 6 | Facility: CLINIC | Age: 74
Setting detail: DERMATOLOGY
End: 2024-05-28

## 2024-05-28 DIAGNOSIS — L30.4 ERYTHEMA INTERTRIGO: ICD-10-CM | Status: INADEQUATELY CONTROLLED

## 2024-05-28 DIAGNOSIS — L82.1 OTHER SEBORRHEIC KERATOSIS: ICD-10-CM

## 2024-05-28 DIAGNOSIS — L57.0 ACTINIC KERATOSIS: ICD-10-CM

## 2024-05-28 PROCEDURE — 99213 OFFICE O/P EST LOW 20 MIN: CPT | Mod: 25

## 2024-05-28 PROCEDURE — ? PRESCRIPTION

## 2024-05-28 PROCEDURE — ? COUNSELING

## 2024-05-28 PROCEDURE — ? PRESCRIPTION MEDICATION MANAGEMENT

## 2024-05-28 PROCEDURE — ? LIQUID NITROGEN

## 2024-05-28 PROCEDURE — 17000 DESTRUCT PREMALG LESION: CPT

## 2024-05-28 PROCEDURE — 17003 DESTRUCT PREMALG LES 2-14: CPT

## 2024-05-28 RX ORDER — NYSTATIN 100000 [USP'U]/G
POWDER TOPICAL TID
Qty: 180 | Refills: 3 | Status: ERX | COMMUNITY
Start: 2024-05-28

## 2024-05-28 RX ORDER — FLUCONAZOLE 150 MG/1
TABLET ORAL
Qty: 4 | Refills: 0 | Status: ERX | COMMUNITY
Start: 2024-05-28

## 2024-05-28 RX ADMIN — FLUCONAZOLE: 150 TABLET ORAL at 00:00

## 2024-05-28 RX ADMIN — NYSTATIN: 100000 POWDER TOPICAL at 00:00

## 2024-05-28 ASSESSMENT — LOCATION SIMPLE DESCRIPTION DERM
LOCATION SIMPLE: RIGHT TEMPLE
LOCATION SIMPLE: LEFT THIGH
LOCATION SIMPLE: LEFT CHEEK

## 2024-05-28 ASSESSMENT — LOCATION DETAILED DESCRIPTION DERM
LOCATION DETAILED: LEFT CENTRAL MALAR CHEEK
LOCATION DETAILED: LEFT ANTERIOR PROXIMAL THIGH
LOCATION DETAILED: LEFT SUPERIOR MEDIAL MALAR CHEEK
LOCATION DETAILED: LEFT INFERIOR MEDIAL MALAR CHEEK
LOCATION DETAILED: LEFT INFERIOR LATERAL MALAR CHEEK
LOCATION DETAILED: RIGHT CENTRAL TEMPLE

## 2024-05-28 ASSESSMENT — LOCATION ZONE DERM
LOCATION ZONE: LEG
LOCATION ZONE: FACE

## 2024-06-14 ENCOUNTER — TELEPHONE (OUTPATIENT)
Dept: CARDIOLOGY | Facility: MEDICAL CENTER | Age: 74
End: 2024-06-14
Payer: MEDICARE

## 2024-06-14 NOTE — TELEPHONE ENCOUNTER
MAUREEN    Caller: Zander Stewart    Topic/issue: Patient called because he needs his provider to sign off on his lymphodenma treatment. He just needs a verbal okay for this. Patient is okay with a detailed voicemail.     Please advise.    Callback Number: [phone]    Thank you,    Rosa Maria ELLIS

## 2024-06-17 NOTE — TELEPHONE ENCOUNTER
"MAUREEN    Caller: Zander Stewart    Topic/issue: Patient calling to follow up on this message from last Friday - I did advise him of the turn around time for calls and advised him it is \"business days\". Patient understands. He also wanted to know if it was possible to get a referral from his cardiologist put in for Pulmonary Medicine here at [facility]. Please advise.     Callback Number: [phone]    Thank you,  Farideh WEAVER"

## 2024-06-19 NOTE — TELEPHONE ENCOUNTER
MAUREEN    Caller: Zander Stewart    Topic/issue: MEDICAL ADVICE    Maury is requesting a call back to discuss the previous messages. Please advise.    Thank you,  Gab GARCÍA    Callback Number: [phone] (home)

## 2024-07-08 ENCOUNTER — APPOINTMENT (RX ONLY)
Dept: URBAN - METROPOLITAN AREA CLINIC 6 | Facility: CLINIC | Age: 74
Setting detail: DERMATOLOGY
End: 2024-07-08

## 2024-07-08 DIAGNOSIS — L82.1 OTHER SEBORRHEIC KERATOSIS: ICD-10-CM

## 2024-07-08 DIAGNOSIS — L57.0 ACTINIC KERATOSIS: ICD-10-CM

## 2024-07-08 DIAGNOSIS — L30.4 ERYTHEMA INTERTRIGO: ICD-10-CM | Status: WELL CONTROLLED

## 2024-07-08 PROCEDURE — ? PRESCRIPTION MEDICATION MANAGEMENT

## 2024-07-08 PROCEDURE — ? LIQUID NITROGEN

## 2024-07-08 PROCEDURE — 99213 OFFICE O/P EST LOW 20 MIN: CPT | Mod: 25

## 2024-07-08 PROCEDURE — 17000 DESTRUCT PREMALG LESION: CPT

## 2024-07-08 PROCEDURE — ? COUNSELING

## 2024-07-08 PROCEDURE — 17003 DESTRUCT PREMALG LES 2-14: CPT

## 2024-07-08 ASSESSMENT — LOCATION DETAILED DESCRIPTION DERM
LOCATION DETAILED: LEFT LATERAL MANDIBULAR CHEEK
LOCATION DETAILED: LEFT SUPERIOR CENTRAL BUCCAL CHEEK
LOCATION DETAILED: LEFT INFERIOR CENTRAL MALAR CHEEK
LOCATION DETAILED: LEFT ANTERIOR PROXIMAL THIGH

## 2024-07-08 ASSESSMENT — LOCATION ZONE DERM
LOCATION ZONE: FACE
LOCATION ZONE: LEG

## 2024-07-08 ASSESSMENT — LOCATION SIMPLE DESCRIPTION DERM
LOCATION SIMPLE: LEFT THIGH
LOCATION SIMPLE: LEFT CHEEK

## 2024-08-24 ENCOUNTER — NON-PROVIDER VISIT (OUTPATIENT)
Dept: CARDIOLOGY | Facility: MEDICAL CENTER | Age: 74
End: 2024-08-24
Payer: MEDICARE

## 2024-08-26 NOTE — CARDIAC REMOTE MONITOR - SCAN
Device transmission reviewed. Device demonstrated appropriate function.       Electronically Signed by: Drake Westbrook M.D.    8/26/2024  4:13 PM

## 2024-09-17 ENCOUNTER — HOSPITAL ENCOUNTER (OUTPATIENT)
Dept: LAB | Facility: MEDICAL CENTER | Age: 74
End: 2024-09-17
Attending: PHYSICIAN ASSISTANT

## 2024-09-17 LAB
BASOPHILS # BLD AUTO: 1.3 % (ref 0–1.8)
BASOPHILS # BLD: 0.1 K/UL (ref 0–0.12)
EOSINOPHIL # BLD AUTO: 0.29 K/UL (ref 0–0.51)
EOSINOPHIL NFR BLD: 3.8 % (ref 0–6.9)
ERYTHROCYTE [DISTWIDTH] IN BLOOD BY AUTOMATED COUNT: 56 FL (ref 35.9–50)
HCT VFR BLD AUTO: 45.2 % (ref 42–52)
HGB BLD-MCNC: 14.9 G/DL (ref 14–18)
IMM GRANULOCYTES # BLD AUTO: 0.02 K/UL (ref 0–0.11)
IMM GRANULOCYTES NFR BLD AUTO: 0.3 % (ref 0–0.9)
LYMPHOCYTES # BLD AUTO: 1.26 K/UL (ref 1–4.8)
LYMPHOCYTES NFR BLD: 16.7 % (ref 22–41)
MCH RBC QN AUTO: 31.8 PG (ref 27–33)
MCHC RBC AUTO-ENTMCNC: 33 G/DL (ref 32.3–36.5)
MCV RBC AUTO: 96.4 FL (ref 81.4–97.8)
MONOCYTES # BLD AUTO: 0.67 K/UL (ref 0–0.85)
MONOCYTES NFR BLD AUTO: 8.9 % (ref 0–13.4)
NEUTROPHILS # BLD AUTO: 5.22 K/UL (ref 1.82–7.42)
NEUTROPHILS NFR BLD: 69 % (ref 44–72)
NRBC # BLD AUTO: 0 K/UL
NRBC BLD-RTO: 0 /100 WBC (ref 0–0.2)
PLATELET # BLD AUTO: 189 K/UL (ref 164–446)
PMV BLD AUTO: 11.9 FL (ref 9–12.9)
PSA SERPL-MCNC: 0.45 NG/ML (ref 0–4)
RBC # BLD AUTO: 4.69 M/UL (ref 4.7–6.1)
WBC # BLD AUTO: 7.6 K/UL (ref 4.8–10.8)

## 2024-09-17 PROCEDURE — 85025 COMPLETE CBC W/AUTO DIFF WBC: CPT

## 2024-09-17 PROCEDURE — 84153 ASSAY OF PSA TOTAL: CPT | Mod: GZ

## 2024-09-17 PROCEDURE — 36415 COLL VENOUS BLD VENIPUNCTURE: CPT

## 2024-11-24 ENCOUNTER — NON-PROVIDER VISIT (OUTPATIENT)
Dept: CARDIOLOGY | Facility: MEDICAL CENTER | Age: 74
End: 2024-11-24

## 2024-11-25 PROCEDURE — 93294 REM INTERROG EVL PM/LDLS PM: CPT | Performed by: INTERNAL MEDICINE

## 2024-12-05 ENCOUNTER — RX ONLY (RX ONLY)
Age: 74
End: 2024-12-05

## 2024-12-05 ENCOUNTER — APPOINTMENT (OUTPATIENT)
Dept: URBAN - METROPOLITAN AREA CLINIC 22 | Facility: CLINIC | Age: 74
Setting detail: DERMATOLOGY
End: 2024-12-05

## 2024-12-05 DIAGNOSIS — L21.8 OTHER SEBORRHEIC DERMATITIS: ICD-10-CM

## 2024-12-05 DIAGNOSIS — D22 MELANOCYTIC NEVI: ICD-10-CM

## 2024-12-05 DIAGNOSIS — L82.1 OTHER SEBORRHEIC KERATOSIS: ICD-10-CM

## 2024-12-05 DIAGNOSIS — D18.0 HEMANGIOMA: ICD-10-CM

## 2024-12-05 DIAGNOSIS — Z85.828 PERSONAL HISTORY OF OTHER MALIGNANT NEOPLASM OF SKIN: ICD-10-CM

## 2024-12-05 DIAGNOSIS — L81.4 OTHER MELANIN HYPERPIGMENTATION: ICD-10-CM

## 2024-12-05 DIAGNOSIS — Z71.89 OTHER SPECIFIED COUNSELING: ICD-10-CM

## 2024-12-05 DIAGNOSIS — L57.0 ACTINIC KERATOSIS: ICD-10-CM

## 2024-12-05 DIAGNOSIS — L30.4 ERYTHEMA INTERTRIGO: ICD-10-CM

## 2024-12-05 PROBLEM — C44.41 BASAL CELL CARCINOMA OF SKIN OF SCALP AND NECK: Status: ACTIVE | Noted: 2024-12-05

## 2024-12-05 PROBLEM — D18.01 HEMANGIOMA OF SKIN AND SUBCUTANEOUS TISSUE: Status: ACTIVE | Noted: 2024-12-05

## 2024-12-05 PROBLEM — D22.5 MELANOCYTIC NEVI OF TRUNK: Status: ACTIVE | Noted: 2024-12-05

## 2024-12-05 PROCEDURE — ? ADDITIONAL NOTES

## 2024-12-05 PROCEDURE — ? COUNSELING

## 2024-12-05 PROCEDURE — ? SUNSCREEN RECOMMENDATIONS

## 2024-12-05 PROCEDURE — ? LIQUID NITROGEN

## 2024-12-05 PROCEDURE — ? PRESCRIPTION MEDICATION MANAGEMENT

## 2024-12-05 PROCEDURE — 17003 DESTRUCT PREMALG LES 2-14: CPT

## 2024-12-05 PROCEDURE — 17000 DESTRUCT PREMALG LESION: CPT

## 2024-12-05 PROCEDURE — ? PRESCRIPTION

## 2024-12-05 PROCEDURE — 99214 OFFICE O/P EST MOD 30 MIN: CPT | Mod: 25

## 2024-12-05 RX ORDER — KETOCONAZOLE 20 MG/ML
SHAMPOO, SUSPENSION TOPICAL QD
Qty: 120 | Refills: 6 | Status: CANCELLED
Stop reason: CLARIF

## 2024-12-05 RX ORDER — NYSTATIN 100000 [USP'U]/G
POWDER TOPICAL TID
Qty: 180 | Refills: 3 | Status: CANCELLED

## 2024-12-05 RX ORDER — FLUCONAZOLE 150 MG/1
TABLET ORAL
Qty: 4 | Refills: 2 | Status: ERX

## 2024-12-05 RX ORDER — FLUCONAZOLE 150 MG/1
TABLET ORAL
Qty: 4 | Refills: 0 | Status: CANCELLED
Stop reason: CLARIF

## 2024-12-05 RX ORDER — IMIQUIMOD 12.5 MG/.25G
CREAM TOPICAL
Qty: 24 | Refills: 0 | Status: ERX | COMMUNITY
Start: 2024-12-05

## 2024-12-05 RX ORDER — NYSTATIN CREAM 100000 [USP'U]/G
CREAM TOPICAL BID
Qty: 60 | Refills: 3 | Status: ERX | COMMUNITY
Start: 2024-12-05

## 2024-12-05 RX ORDER — IMIQUIMOD 12.5 MG/.25G
1 CREAM TOPICAL QD
Qty: 12 | Refills: 2 | Status: CANCELLED
Stop reason: CLARIF

## 2024-12-05 RX ADMIN — NYSTATIN CREAM: 100000 CREAM TOPICAL at 00:00

## 2024-12-05 RX ADMIN — IMIQUIMOD: 12.5 CREAM TOPICAL at 00:00

## 2024-12-05 ASSESSMENT — LOCATION DETAILED DESCRIPTION DERM
LOCATION DETAILED: LEFT SUPERIOR LATERAL BUCCAL CHEEK
LOCATION DETAILED: LEFT ANTERIOR PROXIMAL THIGH
LOCATION DETAILED: RIGHT SUPERIOR LATERAL MIDBACK
LOCATION DETAILED: LEFT INFERIOR UPPER BACK
LOCATION DETAILED: RIGHT MEDIAL INFERIOR CHEST
LOCATION DETAILED: LEFT PROXIMAL DORSAL FOREARM
LOCATION DETAILED: RIGHT INFERIOR CENTRAL MALAR CHEEK
LOCATION DETAILED: RIGHT PROXIMAL DORSAL FOREARM

## 2024-12-05 ASSESSMENT — LOCATION SIMPLE DESCRIPTION DERM
LOCATION SIMPLE: LEFT FOREARM
LOCATION SIMPLE: RIGHT LOWER BACK
LOCATION SIMPLE: RIGHT CHEEK
LOCATION SIMPLE: LEFT CHEEK
LOCATION SIMPLE: LEFT THIGH
LOCATION SIMPLE: LEFT UPPER BACK
LOCATION SIMPLE: RIGHT FOREARM
LOCATION SIMPLE: CHEST

## 2024-12-05 ASSESSMENT — LOCATION ZONE DERM
LOCATION ZONE: ARM
LOCATION ZONE: FACE
LOCATION ZONE: LEG
LOCATION ZONE: TRUNK

## 2024-12-10 RX ORDER — IMIQUIMOD 12.5 MG/.25G
CREAM TOPICAL
Qty: 24 | Refills: 0 | Status: ERX

## 2025-03-21 DIAGNOSIS — I25.10 CORONARY ARTERY DISEASE, OCCLUSIVE: ICD-10-CM

## 2025-05-20 ENCOUNTER — APPOINTMENT (OUTPATIENT)
Dept: URBAN - METROPOLITAN AREA CLINIC 22 | Facility: CLINIC | Age: 75
Setting detail: DERMATOLOGY
End: 2025-05-20

## 2025-05-20 ENCOUNTER — RX ONLY (RX ONLY)
Age: 75
End: 2025-05-20

## 2025-05-20 DIAGNOSIS — L30.4 ERYTHEMA INTERTRIGO: ICD-10-CM

## 2025-05-20 PROCEDURE — 99213 OFFICE O/P EST LOW 20 MIN: CPT

## 2025-05-20 PROCEDURE — ? PRESCRIPTION

## 2025-05-20 PROCEDURE — ? COUNSELING

## 2025-05-20 RX ORDER — HYDROCORTISONE 25 MG/G
CREAM TOPICAL BID
Qty: 30 | Refills: 1 | Status: ERX | COMMUNITY
Start: 2025-05-20

## 2025-05-20 RX ORDER — NYSTATIN 100000 [USP'U]/G
POWDER TOPICAL
Qty: 60 | Refills: 1 | Status: ERX

## 2025-05-20 RX ORDER — KETOCONAZOLE 20 MG/G
CREAM TOPICAL BID
Qty: 60 | Refills: 5 | Status: ERX | COMMUNITY
Start: 2025-05-20

## 2025-05-20 RX ADMIN — KETOCONAZOLE: 20 CREAM TOPICAL at 00:00

## 2025-05-20 RX ADMIN — HYDROCORTISONE: 25 CREAM TOPICAL at 00:00

## 2025-07-15 ENCOUNTER — APPOINTMENT (OUTPATIENT)
Dept: URBAN - METROPOLITAN AREA CLINIC 22 | Facility: CLINIC | Age: 75
Setting detail: DERMATOLOGY
End: 2025-07-15

## 2025-07-15 DIAGNOSIS — L30.4 ERYTHEMA INTERTRIGO: ICD-10-CM

## 2025-07-15 DIAGNOSIS — I87.2 VENOUS INSUFFICIENCY (CHRONIC) (PERIPHERAL): ICD-10-CM

## 2025-07-15 PROCEDURE — ? PRESCRIPTION

## 2025-07-15 PROCEDURE — ? COUNSELING

## 2025-07-15 RX ORDER — FLUCONAZOLE 150 MG/1
TABLET ORAL QW
Qty: 4 | Refills: 1 | Status: ERX

## 2025-07-15 RX ORDER — TRIAMCINOLONE ACETONIDE 1 MG/G
CREAM TOPICAL
Qty: 80 | Refills: 2 | Status: ERX | COMMUNITY
Start: 2025-07-15

## 2025-07-15 RX ADMIN — TRIAMCINOLONE ACETONIDE: 1 CREAM TOPICAL at 00:00

## 2025-07-16 ENCOUNTER — TELEPHONE (OUTPATIENT)
Dept: CARDIOLOGY | Facility: MEDICAL CENTER | Age: 75
End: 2025-07-16

## 2025-07-16 NOTE — TELEPHONE ENCOUNTER
Patient is being followed by [facility]iology--remote monitoring transferred.

## 2025-08-18 DIAGNOSIS — I25.10 CORONARY ARTERY DISEASE, OCCLUSIVE: ICD-10-CM
